# Patient Record
Sex: FEMALE | Race: BLACK OR AFRICAN AMERICAN | NOT HISPANIC OR LATINO | URBAN - METROPOLITAN AREA
[De-identification: names, ages, dates, MRNs, and addresses within clinical notes are randomized per-mention and may not be internally consistent; named-entity substitution may affect disease eponyms.]

---

## 2017-01-06 ENCOUNTER — GENERIC CONVERSION - ENCOUNTER (OUTPATIENT)
Dept: OTHER | Facility: OTHER | Age: 72
End: 2017-01-06

## 2017-05-25 ENCOUNTER — GENERIC CONVERSION - ENCOUNTER (OUTPATIENT)
Dept: OTHER | Facility: OTHER | Age: 72
End: 2017-05-25

## 2017-05-31 ENCOUNTER — GENERIC CONVERSION - ENCOUNTER (OUTPATIENT)
Dept: OTHER | Facility: OTHER | Age: 72
End: 2017-05-31

## 2017-07-21 ENCOUNTER — GENERIC CONVERSION - ENCOUNTER (OUTPATIENT)
Dept: OTHER | Facility: OTHER | Age: 72
End: 2017-07-21

## 2017-07-30 ENCOUNTER — GENERIC CONVERSION - ENCOUNTER (OUTPATIENT)
Dept: OTHER | Facility: OTHER | Age: 72
End: 2017-07-30

## 2017-10-12 ENCOUNTER — GENERIC CONVERSION - ENCOUNTER (OUTPATIENT)
Dept: OTHER | Facility: OTHER | Age: 72
End: 2017-10-12

## 2017-10-12 DIAGNOSIS — G62.9 POLYNEUROPATHY: ICD-10-CM

## 2017-10-12 DIAGNOSIS — M54.9 DORSALGIA: ICD-10-CM

## 2017-10-12 DIAGNOSIS — M54.12 RADICULOPATHY OF CERVICAL REGION: ICD-10-CM

## 2017-10-12 DIAGNOSIS — G95.20 CORD COMPRESSION (HCC): ICD-10-CM

## 2017-10-25 ENCOUNTER — HOSPITAL ENCOUNTER (OUTPATIENT)
Dept: MRI IMAGING | Facility: CLINIC | Age: 72
Discharge: HOME | End: 2017-10-25
Payer: MEDICARE

## 2017-10-25 ENCOUNTER — HOSPITAL ENCOUNTER (OUTPATIENT)
Dept: MRI IMAGING | Facility: CLINIC | Age: 72
End: 2017-10-25
Payer: MEDICARE

## 2017-10-25 DIAGNOSIS — M54.9 DORSALGIA: ICD-10-CM

## 2017-10-25 DIAGNOSIS — M54.12 RADICULOPATHY OF CERVICAL REGION: ICD-10-CM

## 2017-10-25 PROCEDURE — 72141 MRI NECK SPINE W/O DYE: CPT

## 2017-10-26 ENCOUNTER — GENERIC CONVERSION - ENCOUNTER (OUTPATIENT)
Dept: OTHER | Facility: OTHER | Age: 72
End: 2017-10-26

## 2017-10-27 ENCOUNTER — ALLSCRIPTS OFFICE VISIT (OUTPATIENT)
Dept: OTHER | Facility: OTHER | Age: 72
End: 2017-10-27

## 2017-10-27 ENCOUNTER — GENERIC CONVERSION - ENCOUNTER (OUTPATIENT)
Dept: OTHER | Facility: OTHER | Age: 72
End: 2017-10-27

## 2017-10-27 ENCOUNTER — HOSPITAL ENCOUNTER (INPATIENT)
Facility: HOSPITAL | Age: 72
LOS: 6 days | DRG: 542 | End: 2017-11-02
Attending: NEUROLOGICAL SURGERY | Admitting: INTERNAL MEDICINE
Payer: MEDICARE

## 2017-10-27 DIAGNOSIS — C79.49 METASTASIS TO SPINAL CORD (HCC): ICD-10-CM

## 2017-10-27 DIAGNOSIS — G95.20 CORD COMPRESSION (HCC): ICD-10-CM

## 2017-10-27 DIAGNOSIS — C50.919 BREAST CANCER (HCC): Primary | ICD-10-CM

## 2017-10-27 DIAGNOSIS — G95.20 CERVICAL CORD COMPRESSION WITH MYELOPATHY (HCC): ICD-10-CM

## 2017-10-27 PROBLEM — I10 ESSENTIAL HYPERTENSION: Status: ACTIVE | Noted: 2017-10-27

## 2017-10-27 LAB
ANION GAP SERPL CALCULATED.3IONS-SCNC: 6 MMOL/L (ref 4–13)
BASOPHILS # BLD AUTO: 0.01 THOUSANDS/ΜL (ref 0–0.1)
BASOPHILS NFR BLD AUTO: 0 % (ref 0–1)
BUN SERPL-MCNC: 6 MG/DL (ref 5–25)
CALCIUM SERPL-MCNC: 8.5 MG/DL (ref 8.3–10.1)
CHLORIDE SERPL-SCNC: 107 MMOL/L (ref 100–108)
CO2 SERPL-SCNC: 27 MMOL/L (ref 21–32)
CREAT SERPL-MCNC: 0.64 MG/DL (ref 0.6–1.3)
EOSINOPHIL # BLD AUTO: 0.04 THOUSAND/ΜL (ref 0–0.61)
EOSINOPHIL NFR BLD AUTO: 1 % (ref 0–6)
ERYTHROCYTE [DISTWIDTH] IN BLOOD BY AUTOMATED COUNT: 13.4 % (ref 11.6–15.1)
GFR SERPL CREATININE-BSD FRML MDRD: 105 ML/MIN/1.73SQ M
GLUCOSE SERPL-MCNC: 91 MG/DL (ref 65–140)
HCT VFR BLD AUTO: 34.9 % (ref 34.8–46.1)
HGB BLD-MCNC: 11.2 G/DL (ref 11.5–15.4)
LYMPHOCYTES # BLD AUTO: 1.23 THOUSANDS/ΜL (ref 0.6–4.47)
LYMPHOCYTES NFR BLD AUTO: 24 % (ref 14–44)
MCH RBC QN AUTO: 27.3 PG (ref 26.8–34.3)
MCHC RBC AUTO-ENTMCNC: 32.1 G/DL (ref 31.4–37.4)
MCV RBC AUTO: 85 FL (ref 82–98)
MONOCYTES # BLD AUTO: 0.46 THOUSAND/ΜL (ref 0.17–1.22)
MONOCYTES NFR BLD AUTO: 9 % (ref 4–12)
NEUTROPHILS # BLD AUTO: 3.49 THOUSANDS/ΜL (ref 1.85–7.62)
NEUTS SEG NFR BLD AUTO: 66 % (ref 43–75)
NRBC BLD AUTO-RTO: 0 /100 WBCS
PLATELET # BLD AUTO: 248 THOUSANDS/UL (ref 149–390)
PMV BLD AUTO: 8.7 FL (ref 8.9–12.7)
POTASSIUM SERPL-SCNC: 3.8 MMOL/L (ref 3.5–5.3)
RBC # BLD AUTO: 4.1 MILLION/UL (ref 3.81–5.12)
SODIUM SERPL-SCNC: 140 MMOL/L (ref 136–145)
WBC # BLD AUTO: 5.24 THOUSAND/UL (ref 4.31–10.16)

## 2017-10-27 PROCEDURE — 80048 BASIC METABOLIC PNL TOTAL CA: CPT | Performed by: INTERNAL MEDICINE

## 2017-10-27 PROCEDURE — 85025 COMPLETE CBC W/AUTO DIFF WBC: CPT | Performed by: INTERNAL MEDICINE

## 2017-10-27 RX ORDER — ACETAMINOPHEN 325 MG/1
650 TABLET ORAL EVERY 6 HOURS PRN
Status: DISCONTINUED | OUTPATIENT
Start: 2017-10-27 | End: 2017-10-28

## 2017-10-27 RX ORDER — PROCHLORPERAZINE MALEATE 5 MG/1
5 TABLET ORAL EVERY 6 HOURS PRN
COMMUNITY
End: 2017-11-17 | Stop reason: HOSPADM

## 2017-10-27 RX ORDER — LEVOTHYROXINE SODIUM 0.1 MG/1
100 TABLET ORAL DAILY
COMMUNITY

## 2017-10-27 RX ORDER — MORPHINE SULFATE 15 MG/1
15 TABLET, FILM COATED, EXTENDED RELEASE ORAL 2 TIMES DAILY
COMMUNITY
End: 2017-11-17 | Stop reason: HOSPADM

## 2017-10-27 RX ORDER — HEPARIN SODIUM 5000 [USP'U]/ML
5000 INJECTION, SOLUTION INTRAVENOUS; SUBCUTANEOUS EVERY 8 HOURS SCHEDULED
Status: DISCONTINUED | OUTPATIENT
Start: 2017-10-27 | End: 2017-11-02 | Stop reason: HOSPADM

## 2017-10-27 RX ORDER — PREGABALIN 50 MG/1
50 CAPSULE ORAL 3 TIMES DAILY
COMMUNITY

## 2017-10-27 RX ORDER — ONDANSETRON 2 MG/ML
4 INJECTION INTRAMUSCULAR; INTRAVENOUS EVERY 6 HOURS PRN
Status: DISCONTINUED | OUTPATIENT
Start: 2017-10-27 | End: 2017-11-02 | Stop reason: HOSPADM

## 2017-10-27 RX ORDER — AMLODIPINE BESYLATE 5 MG/1
5 TABLET ORAL DAILY
COMMUNITY

## 2017-10-27 RX ORDER — DEXAMETHASONE 4 MG/1
4 TABLET ORAL DAILY
Status: DISCONTINUED | OUTPATIENT
Start: 2017-10-28 | End: 2017-10-30

## 2017-10-27 RX ADMIN — HEPARIN SODIUM 5000 UNITS: 5000 INJECTION, SOLUTION INTRAVENOUS; SUBCUTANEOUS at 18:12

## 2017-10-27 NOTE — H&P
History and Physical - [facility] Internal Medicine    Patient Information: Susy Yu 81 y o  female MRN: [id number]  Unit/Bed#: [facility] 920-01 Encounter: [id number]  Admitting Physician: Winsome Santo MD  PCP: Maritza Loredo MD  Date of Admission:  10/27/17    Assessment/Plan:    Hospital Problem List:     Principal Problem:    Cord compression [facility])  Active Problems:    Breast cancer ([address] )    Essential hypertension      Plan for the Primary Problem(s):  · 1  Cervical spinal cord compression- neurosurgery consulted  Will place on decadron  · 2  H/o of breast ca with metastasis- patient has been following up with [facility] of [facility]  Hematology consulted  · 3  HTN- will continue to monitor and adjust medication as needed  ·     Plan for Additional Problems:   ·     VTE Prophylaxis: Heparin  / sequential compression device   Code Status: Full  POLST: There is no POLST form on file for this patient (pre-hospital)    Anticipated Length of Stay:  Patient will be admitted on an Inpatient basis with an anticipated length of stay of  more 2 midnights  Justification for Hospital Stay: Spinal cord compression, breast ca with metastasis    Total Time for Visit, including Counseling / Coordination of Care: 30 minutes  Greater than 50% of this total time spent on direct patient counseling and coordination of care  Chief Complaint:   Lower extremity weakness for past 8 months    History of Present Illness:    Susy Yu is a 81 y o  female who presents with pmhx of breast cancer with metastasis, HTN, presents as a direct admission from neurosurgery in the [place] [id number] because patient was seen in the office and found to have a cervical spinal cord compression on MRI  Patient has a history of breast ca diagnosed in 2010  Patient has been getting followup for her cancer in [place] and now with [facility]  She also complains of being unable to walk for the past month and is now wheel chair bound  Patient admitted for further evaluation  Review of Systems:    Review of Systems   HENT: Negative  Eyes: Negative  Respiratory: Negative  Cardiovascular: Negative  Gastrointestinal: Negative  Genitourinary: Negative  Musculoskeletal: Positive for arthralgias, gait problem, myalgias and neck stiffness  Negative for back pain, joint swelling and neck pain  Neurological: Positive for weakness  Negative for dizziness, tremors, seizures, syncope, facial asymmetry, speech difficulty, light-headedness, numbness and headaches  Psychiatric/Behavioral: Negative  Past Medical and Surgical History:     No past medical history on file  No past surgical history on file  Meds/Allergies:    Prior to Admission medications    Not on File     I have reviewed home medications with patient personally  Allergies: Allergies   Allergen Reactions    Gabapentin GI Intolerance    Hydrocodone GI Intolerance    Ibuprofen Rash       Social History:     Marital Status:    Occupation:   Patient Pre-hospital Living Situation: home  Patient Pre-hospital Level of Mobility: wheelchair  Patient Pre-hospital Diet Restrictions: low sodium  Substance Use History:   History   Alcohol use Not on file     History   Smoking Status    Not on file   Smokeless Tobacco    Not on file     History   Drug use: Unknown       Family History:    non-contributory    Physical Exam:     Vitals:        Physical Exam   Constitutional: She is oriented to person, place, and time  She appears well-developed and well-nourished  HENT:   Head: Normocephalic and atraumatic  Eyes: Conjunctivae and EOM are normal  Pupils are equal, round, and reactive to light  Neck: Normal range of motion  No JVD present  No tracheal deviation present  No thyromegaly present  Cardiovascular: Normal rate, regular rhythm and normal heart sounds  Exam reveals no gallop and no friction rub  No murmur heard    Pulmonary/Chest: Effort normal and breath sounds normal  No respiratory distress  She has no wheezes  She has no rales  Abdominal: Soft  Bowel sounds are normal  She exhibits no distension  There is no tenderness  There is no rebound  Musculoskeletal: She exhibits no edema  Bilater LE strength 3/5   Neurological: She is alert and oriented to person, place, and time  Vitals reviewed  Additional Data:     Lab Results: I have personally reviewed pertinent reports  Invalid input(s): LABALBU        Imaging: I have personally reviewed pertinent reports  Mri Cervical Spine Wo Contrast    Result Date: 10/26/2017  Narrative: MRI CERVICAL SPINE WITHOUT CONTRAST INDICATION:  M54 12: Radiculopathy, cervical region  History taken directly from the electronic ordering system  COMPARISON:  None  TECHNIQUE:  Sagittal T1, sagittal T2, sagittal inversion recovery, axial T2, axial  2D merge     IMAGE QUALITY:  Diagnostic FINDINGS: ALIGNMENT:  Developmental narrowing of the spinal canal is noted  MARROW SIGNAL:  The overall marrow signal appears maintained for age  No clear evidence for marrow edema pattern or marrow replacement  CERVICAL AND VISUALIZED THORACIC CORD:  There is intramedullary T2 hyperintensity with suggestion of mild cord expansion extending from mainly C3-C4 to C5-C6 with trace extension of signal to the mid upper C3 level as well suspected inferior extension to  the level of C7-T1  Given the adjacent compressive change, compressive myelopathy would be the main consideration  However, this needs a contrast-enhanced examination, especially in light of history of malignancy  PREVERTEBRAL AND PARASPINAL SOFT TISSUES:   Incomplete evaluation of the left apex  Signs of subcutaneous edema and reticulation involving the left neck musculature  Again these are incompletely evaluated          VISUALIZED POSTERIOR FOSSA:  The visualized posterior fossa demonstrates no abnormal signal  CERVICAL DISC SPACES: C2-C3:  No significant spinal canal stenosis  No right and no left neural foraminal stenosis  C3-C4:  Central disc protrusion  Disc osteophyte complex with uncovertebral hypertrophy  Mild compression on the central cord  Moderate to severe right and mild to moderate left neural foraminal stenosis  C4-C5:  Disc osteophyte complex, uncovertebral hypertrophy, and facet arthropathy  Central protrusion  Mild compression on the ventral cord  Moderate to severe right and mild left neural foraminal stenosis  C5-C6:  Disc osteophyte complex, uncovertebral hypertrophy, and facet arthropathy  Mild compression on the cord  Moderate to severe right and severe left neural foraminal stenosis  C6-C7:  Disc osteophyte complex, uncovertebral hypertrophy, and facet arthropathy  No significant spinal canal stenosis  No right and moderate left neural foraminal stenosis  C7-T1:  No significant spinal canal stenosis  No right and moderate left neural foraminal stenosis  UPPER THORACIC DISC SPACES:  Normal      Impression: Signs of cord signal abnormality and associated expansion with the dominant component extending from C3-C4 to C5-C6  Signs of flanking intramedullary signal extending above and below without associated expansion  Given the presence of cord compression,  suspect compressive myelopathy  However, given history of malignancy and possible radiation treatment (although the marrow signal does not confirm this], needs correlation with contrast-enhanced imaging  No signs of a marrow infiltrative process or marrow edema pattern  Incomplete evaluation of the left apex with signs of edema involving the left neck musculature  Needs correlation with dedicated cross-sectional imaging or prior imaging if available   ##phoslh##phoslh ##cfslh I personally discussed this result with David Borja on 10/26/2017 2:00 PM  ## Workstation performed: [id number]       EKG, Pathology, and Other Studies Reviewed on Admission: · EKG:     Allscripts / Epic Records Reviewed: Yes     ** Please Note: This note has been constructed using a voice recognition system   **
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
## 2017-10-28 ENCOUNTER — APPOINTMENT (INPATIENT)
Dept: RADIOLOGY | Facility: HOSPITAL | Age: 72
DRG: 542 | End: 2017-10-28
Payer: MEDICARE

## 2017-10-28 PROBLEM — E03.9 HYPOTHYROIDISM: Status: ACTIVE | Noted: 2017-10-28

## 2017-10-28 LAB
ANION GAP SERPL CALCULATED.3IONS-SCNC: 7 MMOL/L (ref 4–13)
BUN SERPL-MCNC: 6 MG/DL (ref 5–25)
CALCIUM SERPL-MCNC: 8.8 MG/DL (ref 8.3–10.1)
CHLORIDE SERPL-SCNC: 105 MMOL/L (ref 100–108)
CO2 SERPL-SCNC: 26 MMOL/L (ref 21–32)
CREAT SERPL-MCNC: 0.5 MG/DL (ref 0.6–1.3)
ERYTHROCYTE [DISTWIDTH] IN BLOOD BY AUTOMATED COUNT: 13.6 % (ref 11.6–15.1)
GFR SERPL CREATININE-BSD FRML MDRD: 113 ML/MIN/1.73SQ M
GLUCOSE SERPL-MCNC: 85 MG/DL (ref 65–140)
HCT VFR BLD AUTO: 36.7 % (ref 34.8–46.1)
HGB BLD-MCNC: 11.8 G/DL (ref 11.5–15.4)
MCH RBC QN AUTO: 27.4 PG (ref 26.8–34.3)
MCHC RBC AUTO-ENTMCNC: 32.2 G/DL (ref 31.4–37.4)
MCV RBC AUTO: 85 FL (ref 82–98)
PLATELET # BLD AUTO: 265 THOUSANDS/UL (ref 149–390)
PMV BLD AUTO: 9.3 FL (ref 8.9–12.7)
POTASSIUM SERPL-SCNC: 3.8 MMOL/L (ref 3.5–5.3)
RBC # BLD AUTO: 4.3 MILLION/UL (ref 3.81–5.12)
SODIUM SERPL-SCNC: 138 MMOL/L (ref 136–145)
WBC # BLD AUTO: 3.11 THOUSAND/UL (ref 4.31–10.16)

## 2017-10-28 PROCEDURE — 74177 CT ABD & PELVIS W/CONTRAST: CPT

## 2017-10-28 PROCEDURE — 86300 IMMUNOASSAY TUMOR CA 15-3: CPT | Performed by: INTERNAL MEDICINE

## 2017-10-28 PROCEDURE — 85027 COMPLETE CBC AUTOMATED: CPT | Performed by: INTERNAL MEDICINE

## 2017-10-28 PROCEDURE — 72157 MRI CHEST SPINE W/O & W/DYE: CPT

## 2017-10-28 PROCEDURE — 80048 BASIC METABOLIC PNL TOTAL CA: CPT | Performed by: INTERNAL MEDICINE

## 2017-10-28 PROCEDURE — 72142 MRI NECK SPINE W/DYE: CPT

## 2017-10-28 PROCEDURE — 71260 CT THORAX DX C+: CPT

## 2017-10-28 PROCEDURE — A9585 GADOBUTROL INJECTION: HCPCS | Performed by: INTERNAL MEDICINE

## 2017-10-28 RX ORDER — PREGABALIN 25 MG/1
50 CAPSULE ORAL 3 TIMES DAILY
Status: DISCONTINUED | OUTPATIENT
Start: 2017-10-28 | End: 2017-11-02 | Stop reason: HOSPADM

## 2017-10-28 RX ORDER — OXYCODONE HYDROCHLORIDE 10 MG/1
10 TABLET ORAL EVERY 8 HOURS PRN
Status: DISCONTINUED | OUTPATIENT
Start: 2017-10-28 | End: 2017-11-02 | Stop reason: HOSPADM

## 2017-10-28 RX ORDER — ACETAMINOPHEN 325 MG/1
975 TABLET ORAL EVERY 8 HOURS SCHEDULED
Status: DISCONTINUED | OUTPATIENT
Start: 2017-10-28 | End: 2017-11-02 | Stop reason: HOSPADM

## 2017-10-28 RX ORDER — LIDOCAINE 50 MG/G
1 PATCH TOPICAL DAILY
Status: COMPLETED | OUTPATIENT
Start: 2017-10-29 | End: 2017-10-29

## 2017-10-28 RX ORDER — OXYCODONE HYDROCHLORIDE 5 MG/1
10 TABLET ORAL 3 TIMES DAILY
COMMUNITY
End: 2017-11-17 | Stop reason: HOSPADM

## 2017-10-28 RX ORDER — MORPHINE SULFATE 15 MG/1
15 TABLET, FILM COATED, EXTENDED RELEASE ORAL 2 TIMES DAILY
Status: DISCONTINUED | OUTPATIENT
Start: 2017-10-28 | End: 2017-11-02 | Stop reason: HOSPADM

## 2017-10-28 RX ORDER — AMOXICILLIN 250 MG
1 CAPSULE ORAL
Status: DISCONTINUED | OUTPATIENT
Start: 2017-10-28 | End: 2017-11-02 | Stop reason: HOSPADM

## 2017-10-28 RX ORDER — AMLODIPINE BESYLATE 5 MG/1
5 TABLET ORAL DAILY
Status: DISCONTINUED | OUTPATIENT
Start: 2017-10-28 | End: 2017-11-02 | Stop reason: HOSPADM

## 2017-10-28 RX ORDER — POLYETHYLENE GLYCOL 3350 17 G/17G
17 POWDER, FOR SOLUTION ORAL DAILY PRN
Status: DISCONTINUED | OUTPATIENT
Start: 2017-10-28 | End: 2017-11-02 | Stop reason: HOSPADM

## 2017-10-28 RX ORDER — LEVOTHYROXINE SODIUM 0.1 MG/1
100 TABLET ORAL
Status: DISCONTINUED | OUTPATIENT
Start: 2017-10-28 | End: 2017-11-02 | Stop reason: HOSPADM

## 2017-10-28 RX ADMIN — ACETAMINOPHEN 650 MG: 325 TABLET, FILM COATED ORAL at 09:10

## 2017-10-28 RX ADMIN — DEXAMETHASONE 4 MG: 4 TABLET ORAL at 09:10

## 2017-10-28 RX ADMIN — ACETAMINOPHEN 975 MG: 325 TABLET, FILM COATED ORAL at 21:11

## 2017-10-28 RX ADMIN — PREGABALIN 50 MG: 25 CAPSULE ORAL at 21:11

## 2017-10-28 RX ADMIN — HEPARIN SODIUM 5000 UNITS: 5000 INJECTION, SOLUTION INTRAVENOUS; SUBCUTANEOUS at 21:11

## 2017-10-28 RX ADMIN — Medication 1 TABLET: at 21:11

## 2017-10-28 RX ADMIN — HEPARIN SODIUM 5000 UNITS: 5000 INJECTION, SOLUTION INTRAVENOUS; SUBCUTANEOUS at 06:21

## 2017-10-28 RX ADMIN — PREGABALIN 50 MG: 25 CAPSULE ORAL at 16:57

## 2017-10-28 RX ADMIN — HEPARIN SODIUM 5000 UNITS: 5000 INJECTION, SOLUTION INTRAVENOUS; SUBCUTANEOUS at 14:06

## 2017-10-28 RX ADMIN — GADOBUTROL 6 ML: 604.72 INJECTION INTRAVENOUS at 12:25

## 2017-10-28 RX ADMIN — MORPHINE SULFATE 15 MG: 15 TABLET, EXTENDED RELEASE ORAL at 18:54

## 2017-10-28 RX ADMIN — IOHEXOL 100 ML: 350 INJECTION, SOLUTION INTRAVENOUS at 12:04

## 2017-10-28 NOTE — SOCIAL WORK
Cm met with pt to discuss the role of CM  Pt lives with her son in a 2 story home which has 4STE and 13 steps inside  Pt states she is fully dependent for all mobilities PTA and uses a wheelchair for all mobility  Pt's pharmacy is [facility] on [address]  In [address] [facility]  Pt has no hx of mental health or substance abuse  Pt has no hx of IP rehab   Pt states her family will transport if she is able to d/c home

## 2017-10-28 NOTE — PROGRESS NOTES
[facility] Internal Medicine Progress Note  Patient: Karen Hnady 81 y o  female   MRN: [id number]  PCP: Vandana Verde MD  Unit/Bed#: [facility] 920-01 Encounter: [id number]  Date Of Visit: 10/28/17    Assessment/Plan:  · Cervical cord compression with myelopathy ([facility] [address] ) - likely related to metastatic disease  Discussed with Neurosurgery  No immediate plans for surgical intervention at this time  Patient is pending MRI cervical/thoracic spine with contrast   Will also obtain CT of the chest/abdomen/pelvis with contrast to evaluate for metastatic disease  Continue Decadron and await Oncology input  · Functional quadriparesis - in the setting of above  Management as above  · Metastatic Breast cancer ([facility] [address] ) - management per Oncology  · Essential hypertension - resume home medications  · Hypothyroidism - continue levothyroxine    VTE Pharmacologic Prophylaxis:   Pharmacologic: Heparin  Mechanical VTE Prophylaxis in Place: Yes    Patient Centered Rounds: I have performed bedside rounds with nursing staff today  Discussions with Specialists or Other Care Team Provider:  Nursing/neurosurgery    Education and Discussions with Family / Patient:  Patient/discussed with daughter Mora Putnam on phone  She was updated on current plan of care  All questions answered  Current Length of Stay: 1 day(s)    Current Patient Status: Inpatient   Certification Statement: The patient will continue to require additional inpatient hospital stay due to Cervical cord compression/functional quadriparesis    Discharge Plan:  Not stable for discharge    Code Status: Level 1 - Full Code      Subjective:   Patient seen and evaluated  She has had progressive functional quadriparesis over the last several months  She also notes upper extremity tremors  Denies any fever or chills, no chest pain or shortness of breath, no nausea, vomiting or abdominal pain      Objective:     Vitals:   Temp (24hrs), Av 3 °F (36 8 °C), Min:98 1 °F (36 7 °C), Max:98 6 °F (37 °C)    HR:  [76-86] 77  Resp:  [16-18] 18  BP: ()/(67-86) 99/68  SpO2:  [92 %-98 %] 97 %  There is no height or weight on file to calculate BMI  Input and Output Summary (last 24 hours): Intake/Output Summary (Last 24 hours) at 10/28/17 1047  Last data filed at 10/28/17 1001   Gross per 24 hour   Intake              200 ml   Output              430 ml   Net             -230 ml       Physical Exam:  General Appearance:    Alert, cooperative, no distress, appropriately responsive    Head:    Normocephalic, without obvious abnormality, atraumatic, mucous membranes moist    Eyes:    Conjunctiva/corneas clear, EOM's intact   Neck:   Supple   Lungs:     Clear to auscultation bilaterally, respirations unlabored, no crackles or wheeze     Heart:    Regular rate and rhythm, S1 and S2    Abdomen:     Soft, non-tender, nondistended   Extremities:   No edema   Neurologic:   Bilateral upper extremity tremors, lower extremity weakness, alert and oriented x3         Additional Data:     Labs:      Results from last 7 days  Lab Units 10/28/17  0546 10/27/17  2016   WBC Thousand/uL 3 11* 5 24   HEMOGLOBIN g/dL 11 8 11 2*   HEMATOCRIT % 36 7 34 9   PLATELETS Thousands/uL 265 248   NEUTROS PCT %  --  66   LYMPHS PCT %  --  24   MONOS PCT %  --  9   EOS PCT %  --  1       Results from last 7 days  Lab Units 10/28/17  0546   SODIUM mmol/L 138   POTASSIUM mmol/L 3 8   CHLORIDE mmol/L 105   CO2 mmol/L 26   BUN mg/dL 6   CREATININE mg/dL 0 50*   CALCIUM mg/dL 8 8   GLUCOSE RANDOM mg/dL 85           * I Have Reviewed All Lab Data Listed Above  * Additional Pertinent Lab Tests Reviewed:  [address] Admission Reviewed    Cultures:   Blood Culture: No results found for: BLOODCX  Urine Culture: No results found for: URINECX  Sputum Culture: No components found for: SPUTUMCX  Wound Culture: No results found for: WOUNDCULT    Last 24 Hours Medication List:     amLODIPine 5 mg Oral Daily dexamethasone 4 mg Oral Daily   heparin (porcine) 5,000 Units Subcutaneous Q8H [address]   levothyroxine 100 mcg Oral Early Morning   morphine 15 mg Oral BID   pregabalin 50 mg Oral TID        Today, Patient Was Seen By: Aurora Ruffin MD    ** Please Note: Dragon 360 Dictation voice to text software may have been used in the creation of this document   **

## 2017-10-28 NOTE — CONSULTS
Oncology Consult Note  Penelope Peabody 81 y o  female MRN: [id number]  [facility] Encounter: [id number]      Presenting Complaint:  Metastatic breast cancer    History of Presenting Illness:  53-year-old  female was diagnosed with initially with stage III left invasive ductal carcinoma of the breast (T2 N2 M0) ER positive, VT positive, HER2 negative with 4/13 positive lymph nodes after lumpectomy, in 2009, status post 4 cycles of adjuvant Adriamycin/Cytoxan followed by Taxotere of 4 cycles finished in 10/2010 and she had radiation therapy and Femara 2 5 mg from February 2011 until October 2016 when she was diagnosed at the time with metastatic breast cancer from the right axillary lymph node biopsy showed poorly differentiated ER negative, VT negative, HER2 negative breast cancer also T8 biopsy confirmed the diagnosis, she had radiation therapy to the thoracic spine from November 2016 and treated with Taxol 80 milligram/meter squared initially and then every 3 weeks in January 2017 until April 2017, imaging studies showed cervical, supraclavicular, axillary, mediastinal, upper abdomen and retroperitoneal lymph nodes with osseous metastasis,  She had progression of disease on Taxol and she was treated with Ixempra  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  She went to [facility] for breast cancer care, and she was initiated on Xeloda in September 2017  The patient noticed 10 days ago increasing weakness of the right lower extremity with drop for, tingling and numbness involving the left shoulder, left arm she is not able to make a full , and she is not able to stand up anymore  She was admitted imaging studies showed possible intramedullary lesion extending from C3-C7 on possible C4/C5 vertebral lesion with cord compression, CT scan showed multiple hepatic lesions, left pleural effusion  She denies any headache blurred vision diplopia odynophagia dysphagia abdominal pain dysuria hematuria melena hematochezia constipation or incontinence    Review of Systems - As stated in the HPI otherwise the fourteen point review of systems was negative  Past Medical History:   Diagnosis Date    Breast cancer ([address] )     Disease of thyroid gland        Social History     Social History    Marital status:      Spouse name: N/A    Number of children: N/A    Years of education: N/A     Social History Main Topics    Smoking status: Never Smoker    Smokeless tobacco: Not on file    Alcohol use No    Drug use: No    Sexual activity: Not on file     Other Topics Concern    Not on file     Social History Narrative    No narrative on file       No family history on file      Allergies   Allergen Reactions    Gabapentin GI Intolerance    Hydrocodone GI Intolerance    Ibuprofen Rash         Current Facility-Administered Medications:     acetaminophen (TYLENOL) tablet 650 mg, 650 mg, Oral, Q6H PRN, Felipe Madison MD, 650 mg at 10/28/17 0910    amLODIPine (NORVASC) tablet 5 mg, 5 mg, Oral, Daily, Jeramie River MD    dexamethasone (DECADRON) tablet 4 mg, 4 mg, Oral, Daily, Felipe Madison MD, 4 mg at 10/28/17 0910    gadobutrol injection (MULTI-DOSE) SOLN 6 mL, 6 mL, Intravenous, Once in imaging, Jeramie River MD    heparin (porcine) subcutaneous injection 5,000 Units, 5,000 Units, Subcutaneous, Q8H [facility], 5,000 Units at 10/28/17 4584 **AND** Platelet count, , , Once, Felipe Madison MD    levothyroxine tablet 100 mcg, 100 mcg, Oral, Early Morning, Jeramie River MD    morphine (MS CONTIN) ER tablet 15 mg, 15 mg, Oral, BID, Jeramie River MD    ondansetron (ZOFRAN) injection 4 mg, 4 mg, Intravenous, Q6H PRN, Felipe Madison MD    pregabalin (LYRICA) capsule 50 mg, 50 mg, Oral, TID, Jeramie River MD      BP 99/68   Pulse 77   Temp 98 1 °F (36 7 °C) (Oral)   Resp 18   SpO2 97%     General Appearance:    Alert, oriented        Eyes:    PERRL   Ears:    Normal external ear canals, both ears   Nose:   Nares normal, septum midline   Throat:   Mucosa moist  Pharynx without injection  Neck:   Supple       Lungs:     No breath sounds in the left base   Chest Wall:    No tenderness or deformity    Heart:    Regular rate and rhythm       Abdomen:     Soft, non-tender, bowel sounds +, no organomegaly           Extremities:   Extremities no cyanosis or edema       Skin:   no rash or icterus      Lymph nodes:   Cervical, supraclavicular, and axillary nodes normal   Neurologic:   Weakness of the left upper extremity, weakness of the right lower extremity +2/4              Recent Results (from the past 48 hour(s))   CBC and differential    Collection Time: 10/27/17  8:16 PM   Result Value Ref Range    WBC 5 24 4 31 - 10 16 Thousand/uL    RBC 4 10 3 81 - 5 12 Million/uL    Hemoglobin 11 2 (L) 11 5 - 15 4 g/dL    Hematocrit 34 9 34 8 - 46 1 %    MCV 85 82 - 98 fL    MCH 27 3 26 8 - 34 3 pg    MCHC 32 1 31 4 - 37 4 g/dL    RDW 13 4 11 6 - 15 1 %    MPV 8 7 (L) 8 9 - 12 7 fL    Platelets 599 056 - 323 Thousands/uL    nRBC 0 /100 WBCs    Neutrophils Relative 66 43 - 75 %    Lymphocytes Relative 24 14 - 44 %    Monocytes Relative 9 4 - 12 %    Eosinophils Relative 1 0 - 6 %    Basophils Relative 0 0 - 1 %    Neutrophils Absolute 3 49 1 85 - 7 62 Thousands/µL    Lymphocytes Absolute 1 23 0 60 - 4 47 Thousands/µL    Monocytes Absolute 0 46 0 17 - 1 22 Thousand/µL    Eosinophils Absolute 0 04 0 00 - 0 61 Thousand/µL    Basophils Absolute 0 01 0 00 - 0 10 Thousands/µL   Basic metabolic panel    Collection Time: 10/27/17  8:16 PM   Result Value Ref Range    Sodium 140 136 - 145 mmol/L    Potassium 3 8 3 5 - 5 3 mmol/L    Chloride 107 100 - 108 mmol/L    CO2 27 21 - 32 mmol/L    Anion Gap 6 4 - 13 mmol/L    BUN 6 5 - 25 mg/dL    Creatinine 0 64 0 60 - 1 30 mg/dL    Glucose 91 65 - 140 mg/dL    Calcium 8 5 8 3 - 10 1 mg/dL    eGFR 105 ml/min/1 73sq m   Basic metabolic panel    Collection Time: 10/28/17  5:46 AM   Result Value Ref Range    Sodium 138 136 - 145 mmol/L    Potassium 3 8 3 5 - 5 3 mmol/L    Chloride 105 100 - 108 mmol/L    CO2 26 21 - 32 mmol/L    Anion Gap 7 4 - 13 mmol/L    BUN 6 5 - 25 mg/dL    Creatinine 0 50 (L) 0 60 - 1 30 mg/dL    Glucose 85 65 - 140 mg/dL    Calcium 8 8 8 3 - 10 1 mg/dL    eGFR 113 ml/min/1 73sq m   CBC (With Platelets)    Collection Time: 10/28/17  5:46 AM   Result Value Ref Range    WBC 3 11 (L) 4 31 - 10 16 Thousand/uL    RBC 4 30 3 81 - 5 12 Million/uL    Hemoglobin 11 8 11 5 - 15 4 g/dL    Hematocrit 36 7 34 8 - 46 1 %    MCV 85 82 - 98 fL    MCH 27 4 26 8 - 34 3 pg    MCHC 32 2 31 4 - 37 4 g/dL    RDW 13 6 11 6 - 15 1 %    Platelets 804 926 - 083 Thousands/uL    MPV 9 3 8 9 - 12 7 fL         Mri Cervical Spine Wo Contrast    Result Date: 10/26/2017  Narrative: MRI CERVICAL SPINE WITHOUT CONTRAST INDICATION:  M54 12: Radiculopathy, cervical region  History taken directly from the electronic ordering system  COMPARISON:  None  TECHNIQUE:  Sagittal T1, sagittal T2, sagittal inversion recovery, axial T2, axial  2D merge     IMAGE QUALITY:  Diagnostic FINDINGS: ALIGNMENT:  Developmental narrowing of the spinal canal is noted  MARROW SIGNAL:  The overall marrow signal appears maintained for age  No clear evidence for marrow edema pattern or marrow replacement  CERVICAL AND VISUALIZED THORACIC CORD:  There is intramedullary T2 hyperintensity with suggestion of mild cord expansion extending from mainly C3-C4 to C5-C6 with trace extension of signal to the mid upper C3 level as well suspected inferior extension to  the level of C7-T1  Given the adjacent compressive change, compressive myelopathy would be the main consideration  However, this needs a contrast-enhanced examination, especially in light of history of malignancy  PREVERTEBRAL AND PARASPINAL SOFT TISSUES:   Incomplete evaluation of the left apex    Signs of subcutaneous edema and reticulation involving the left neck musculature  Again these are incompletely evaluated  VISUALIZED POSTERIOR FOSSA:  The visualized posterior fossa demonstrates no abnormal signal  CERVICAL DISC SPACES:     C2-C3:  No significant spinal canal stenosis  No right and no left neural foraminal stenosis  C3-C4:  Central disc protrusion  Disc osteophyte complex with uncovertebral hypertrophy  Mild compression on the central cord  Moderate to severe right and mild to moderate left neural foraminal stenosis  C4-C5:  Disc osteophyte complex, uncovertebral hypertrophy, and facet arthropathy  Central protrusion  Mild compression on the ventral cord  Moderate to severe right and mild left neural foraminal stenosis  C5-C6:  Disc osteophyte complex, uncovertebral hypertrophy, and facet arthropathy  Mild compression on the cord  Moderate to severe right and severe left neural foraminal stenosis  C6-C7:  Disc osteophyte complex, uncovertebral hypertrophy, and facet arthropathy  No significant spinal canal stenosis  No right and moderate left neural foraminal stenosis  C7-T1:  No significant spinal canal stenosis  No right and moderate left neural foraminal stenosis  UPPER THORACIC DISC SPACES:  Normal      Impression: Signs of cord signal abnormality and associated expansion with the dominant component extending from C3-C4 to C5-C6  Signs of flanking intramedullary signal extending above and below without associated expansion  Given the presence of cord compression,  suspect compressive myelopathy  However, given history of malignancy and possible radiation treatment (although the marrow signal does not confirm this], needs correlation with contrast-enhanced imaging  No signs of a marrow infiltrative process or marrow edema pattern  Incomplete evaluation of the left apex with signs of edema involving the left neck musculature  Needs correlation with dedicated cross-sectional imaging or prior imaging if available   ##phoslh##phoslh ##cfslh I personally discussed this result with Cricket Freeman on 10/26/2017 2:00 PM  ## Workstation performed: [id number]       Assessment and Plan:  1  Metastatic triple negative breast cancer as mentioned above in the history of present illness, she was treated at [facility] with Taxol, Ixempra and the patient change care to [address] she was initiated on Xeloda in September 2017, she presented with weakness of the right lower extremity, with drop foot, weakness of the left upper extremity, MRI of the cervical area showed intramedullary lesion extending between C3-C7, also possible vertebral body involvement of C4-C5 with extension into the spine and possible compression  2  CT scan of the chest abdomen and pelvis showed left pleural effusion moderate in size, multiple hepatic lesions consistent with metastatic disease  3  Neurosurgery are on the case, I believe the patient needs to be evaluated by Radiation Oncology for possible radiation of the cervical spine, she had previous radiation to T8 because of bone metastasis, 4  This is a fast progressing disease, prognosis guarded  4  After finishing from radiation therapy or surgical intervention by Neurosurgery patient to follow up at [address] with her medical oncologist to talk about prognosis and the new finding of hepatic lesions and possible malignant left pleural effusion  5    I will order CA 27-29

## 2017-10-28 NOTE — PLAN OF CARE
Problem: Potential for Falls  Goal: Patient will remain free of falls  INTERVENTIONS:  - Assess patient frequently for physical needs  -  Identify cognitive and physical deficits and behaviors that affect risk of falls  -  [place] fall precautions as indicated by assessment   - Educate patient/family on patient safety including physical limitations  - Instruct patient to call for assistance with activity based on assessment  - Modify environment to reduce risk of injury  - Consider OT/PT consult to assist with strengthening/mobility    Outcome: Progressing      Problem: Nutrition/Hydration-ADULT  Goal: Nutrient/Hydration intake appropriate for improving, restoring or maintaining nutritional needs  Monitor and assess patient's nutrition/hydration status for malnutrition (ex- brittle hair, bruises, dry skin, pale skin and conjunctiva, muscle wasting, smooth red tongue, and disorientation)  Collaborate with interdisciplinary team and initiate plan and interventions as ordered  Monitor patient's weight and dietary intake as ordered or per policy  Utilize nutrition screening tool and intervene per policy  Determine patient's food preferences and provide high-protein, high-caloric foods as appropriate       INTERVENTIONS:  - Monitor oral intake, urinary output, labs, and treatment plans  - Assess nutrition and hydration status and recommend course of action  - Evaluate amount of meals eaten  - Assist patient with eating if necessary   - Allow adequate time for meals  - Recommend/ encourage appropriate diets, oral nutritional supplements, and vitamin/mineral supplements  - Order, calculate, and assess calorie counts as needed  - Recommend, monitor, and adjust tube feedings and TPN/PPN based on assessed needs  - Assess need for intravenous fluids  - Provide specific nutrition/hydration education as appropriate  - Include patient/family/caregiver in decisions related to nutrition   Outcome: Progressing      Problem: Prexisting or High Potential for Compromised Skin Integrity  Goal: Skin integrity is maintained or improved  INTERVENTIONS:  - Identify patients at risk for skin breakdown  - Assess and monitor skin integrity  - Assess and monitor nutrition and hydration status  - Monitor labs (i e  albumin)  - Assess for incontinence   - Turn and reposition patient  - Assist with mobility/ambulation  - Relieve pressure over bony prominences  - Avoid friction and shearing  - Provide appropriate hygiene as needed including keeping skin clean and dry  - Evaluate need for skin moisturizer/barrier cream  - Collaborate with interdisciplinary team (i e  Nutrition, Rehabilitation, etc )   - Patient/family teaching   Outcome: Progressing      Problem: PAIN - ADULT  Goal: Verbalizes/displays adequate comfort level or baseline comfort level  Interventions:  - Encourage patient to monitor pain and request assistance  - Assess pain using appropriate pain scale  - Administer analgesics based on type and severity of pain and evaluate response  - Implement non-pharmacological measures as appropriate and evaluate response  - Consider cultural and social influences on pain and pain management  - Notify physician/advanced practitioner if interventions unsuccessful or patient reports new pain   Outcome: Progressing      Problem: INFECTION - ADULT  Goal: Absence or prevention of progression during hospitalization  INTERVENTIONS:  - Assess and monitor for signs and symptoms of infection  - Monitor lab/diagnostic results  - Monitor all insertion sites, i e  indwelling lines, tubes, and drains  - Monitor endotracheal (as able) and nasal secretions for changes in amount and color  - [place] appropriate cooling/warming therapies per order  - Administer medications as ordered  - Instruct and encourage patient and family to use good hand hygiene technique  - Identify and instruct in appropriate isolation precautions for identified infection/condition Outcome: Progressing    Goal: Absence of fever/infection during neutropenic period  INTERVENTIONS:  - Monitor WBC  - Implement neutropenic guidelines   Outcome: Progressing      Problem: SAFETY ADULT  Goal: Maintain or return to baseline ADL function  INTERVENTIONS:  -  Assess patient's ability to carry out ADLs; assess patient's baseline for ADL function and identify physical deficits which impact ability to perform ADLs (bathing, care of mouth/teeth, toileting, grooming, dressing, etc )  - Assess/evaluate cause of self-care deficits   - Assess range of motion  - Assess patient's mobility; develop plan if impaired  - Assess patient's need for assistive devices and provide as appropriate  - Encourage maximum independence but intervene and supervise when necessary  ¯ Involve family in performance of ADLs  ¯ Assess for home care needs following discharge   ¯ Request OT consult to assist with ADL evaluation and planning for discharge  ¯ Provide patient education as appropriate   Outcome: Progressing    Goal: Maintain or return mobility status to optimal level  INTERVENTIONS:  - Assess patient's baseline mobility status (ambulation, transfers, stairs, etc )    - Identify cognitive and physical deficits and behaviors that affect mobility  - Identify mobility aids required to assist with transfers and/or ambulation (gait belt, sit-to-stand, lift, walker, cane, etc )  - [place] fall precautions as indicated by assessment  - Record patient progress and toleration of activity level on Mobility SBAR; progress patient to next Phase/Stage  - Instruct patient to call for assistance with activity based on assessment  - Request Rehabilitation consult to assist with strengthening/weightbearing, etc    Outcome: Progressing    Goal: Patient will remain free of falls  INTERVENTIONS:  - Assess patient frequently for physical needs  -  Identify cognitive and physical deficits and behaviors that affect risk of falls    -  [place] fall precautions as indicated by assessment   - Educate patient/family on patient safety including physical limitations  - Instruct patient to call for assistance with activity based on assessment  - Modify environment to reduce risk of injury  - Consider OT/PT consult to assist with strengthening/mobility    Outcome: Progressing      Problem: DISCHARGE PLANNING  Goal: Discharge to home or other facility with appropriate resources  INTERVENTIONS:  - Identify barriers to discharge w/patient and caregiver  - Arrange for needed discharge resources and transportation as appropriate  - Identify discharge learning needs (meds, wound care, etc )  - Arrange for interpretive services to assist at discharge as needed  - Refer to Case Management Department for coordinating discharge planning if the patient needs post-hospital services based on physician/advanced practitioner order or complex needs related to functional status, cognitive ability, or social support system   Outcome: Progressing      Problem: Knowledge Deficit  Goal: Patient/family/caregiver demonstrates understanding of disease process, treatment plan, medications, and discharge instructions  Complete learning assessment and assess knowledge base    Interventions:  - Provide teaching at level of understanding  - Provide teaching via preferred learning methods   Outcome: Progressing

## 2017-10-28 NOTE — PROGRESS NOTES
Progress Note - Neurosurgery   Darcie Nguyen 81 y o  female MRN: [id number]  Unit/Bed#: [facility] 920-01 Encounter: [id number]    Assessment:  1  Cervical spinal cord expansile lesion with associated compression and edema  2  Intramedulary and extradural masses of distal spinal cord/filum terminale  3  Diffuse spinal cord signal change from T7-conus medullaris  4  Recurrent metastatic breast invasive ductal carcinoma  5  History of T8 metastasis s/p RT  6  Peripheral neuropathy  7  Hypertension    Plan:  82-year-old female history of breast cancer presents with progressive functional quadriparesis in setting of severe cervical spinal stenosis  Direct admission for further workup and consideration of cervical decompression  · Neuro exam: C3-C6 cord signal change  Intramedullary signal change  · Imaging reviewed personally and with attending and radiologist:  · MRI cervical spine with contrast: expansion of cervical cord C3-6 with 6mm x 9mm enhancing (likely intramedullary mass) at level of C4-5  suboptimal exam due to motion and inability to complete sequences  · MRI thoracic spine: cord signal change from T7 to conus medullaris with mild expansion of spinal cord  2 7 x 1 3cm soft tissue mass arising from conus medullaris (likely intramedullary) in addition to 2 6x1 3 cm mass (likely extradural) adjacent to left lateral conus lesion  Multiple levels of thoracic herniation without significant stenosis  · CT chest abdomen pelvis: left pleural effusion, multiple hepatic lesions, left adrenal mass, multiple bony lesions  · MRI cervical spine wo contrast: Signs of cord signal abnormality and associated expansion with the dominant component extending from C3-C4 to C5-C6   multi level external compression due to disc osteophyte completes/central disc protrusion    · Ongoing mediclal management by primary team, SLIM  · Hematology-Oncology consult requested, appreciate input  · Continue medical management of hypertension  · Pain control: managed by primary team- consider scheduling tylenol, adding oxycodone 5mg PRN moderate pain, 10mg PRN severe pain, dilaudid PRN breakthrough  Patient tolerated PO dilaudid despite allergy to hydrocodone  · Continue subcutaneous heparin, SCDs, and range of motion/mobilization as able for DVT prophylaxis  · Voiding well currently- however, monitor for bowel/bladder dysfunction  · May titrate off decadron as MRI findings are subacute-chronic  · Recommend PT/OT consult  No contraindication to being OOB from nsx standpoint  · Care Everywhere chart reviewed from May 2017 with [facility] oncologist, Dr Abraham Sanabria  · History of stage III, T2 N2 M0, left breast invasive ductal carcinoma, ER positive, GA positive, HER-2/prem negative with 4 out of 13 lymph nodes positive for metastatic carcinoma  · Metastasis to the lymph nodes (cervical, supraclavicular, axillary, mediastinal, upper abdominal and retroperitoneal adenopathy), osseous metastasis and symptomatic T8 lesion (s/p bx and RT)   · Status post biopsy of right axillary lymph node, consistent with metastatic poorly-differentiated carcinoma in 3 lymph nodes, ER/GA negative, and HER-2 negative  · At that time tx was Ixempra 20% dose reduction initiated 5/15/2017, xgeva  · Previously had left breast lumpectomy, 4 cycles of Adriamycin 60 mg/m2 plus Cytoxan 600 mg/m2 given every 3 weeks  Last received 07/2010, 4 cycles of Taxotere at 100 mg/m2 given with growth factor support  Last received 10/04/2010  Femara 2 5 mg daily 02/2011- 10/2016  Radiation to thoracic spine 11/3/16-11/23/16  Taxol 80 mg/m2 initiated 11/21/16  Switched to 3 weeks on 1/3/17- 4/2017  · Discussed with Dr Mellisa Womack  Due to generalized poor prognosis and systemic disease, patient is unlikely to benefit from cervical decompression and fusion for symptomatic improvement  Pain is well controlled and patient has retained bowel/bladder function   New dx of thoracic lesions and thoracic cord edema  Recommend radiation oncology consult for input regarding SRS or other RT options for palliation  Consider palliative care consult  Subjective/Objective   Chief Complaint: " I'm not that good today "    Subjective: Patient reports feeling upset due to new dx of liver mets as Dr Jean-Claude Gay discussed with her  She otherwise feels that her pain is well controlled with the current regimen  Previously located in the neck and across the shoulders  Admits to some stiffness now but is generally comfortable  Patient also admits to LUE radiculopathy which travels from shoulder to elbow and into 3rd and 4th fingers  Admits to decreased sensation in her left arm, also admits to chronic neuropathy in her legs  Patient feels weakness is stable- worst in right leg and left arm  Admits to voiding frequency but no bowel/bladder incontinence or retention  Objective: lying in bed, in NAD    I/O       10/26 0701 - 10/27 0700 10/27 0701 - 10/28 0700 10/28 0701 - 10/29 0700    P  O   200     Total Intake(mL/kg)  200 (3)     Urine (mL/kg/hr)  230     Total Output   230      Net   -30             Unmeasured Urine Occurrence  2 x           Invasive Devices     Peripheral Intravenous Line            Peripheral IV 10/27/17 Right Wrist less than 1 day                Physical Exam:  Vitals: Blood pressure 99/68, pulse 77, temperature 98 1 °F (36 7 °C), temperature source Oral, resp  rate 18, SpO2 97 %  ,There is no height or weight on file to calculate BMI  General appearance: alert, appears stated age, cooperative and no distress  Head: Normocephalic, without obvious abnormality, atraumatic  Eyes: EOMI, PERRL, acuity intact in all fields  Neck: somewhat restricted ROM  Non-tender to palpation    Lungs: non labored breathing  Neurologic:   Mental status: Alert, oriented, thought content appropriate  Cranial nerves: grossly intact (Cranial nerves II-XII)  Sensory: decreased to pinprick throughout entire left arm, but intact in the left hand  Symmetric to light touch in b/l UE  Decreased to light touch in b/l LE distal to knees (history of neuropathy)  Decreased to pinprick sensation in S1 distribution on the left  Diminished discrimination testing in b/l UE  Motor: 5-/5  b/l, 4+/5 throughout proximally, 4/5 abduction/adduction  LLE: 4+/5 HF, HE, KE, DF, PF  RLE: 4/5 HF, HE, DF 3/5 PF  Reflexes: hyporeflexic in b/l LE, 2+ in b/l brachioradialis and biceps  + hoffmans sign on right  No clonus  Coordination: JPS intact in b/l UE  Slowed response time in b/l LE due to neuropathy but 3/3 b/l      Lab Results:    Results from last 7 days  Lab Units 10/28/17  0546 10/27/17  2016   WBC Thousand/uL 3 11* 5 24   HEMOGLOBIN g/dL 11 8 11 2*   HEMATOCRIT % 36 7 34 9   PLATELETS Thousands/uL 265 248   NEUTROS PCT %  --  66   MONOS PCT %  --  9       Results from last 7 days  Lab Units 10/28/17  0546 10/27/17  2016   SODIUM mmol/L 138 140   POTASSIUM mmol/L 3 8 3 8   CHLORIDE mmol/L 105 107   CO2 mmol/L 26 27   BUN mg/dL 6 6   CREATININE mg/dL 0 50* 0 64   CALCIUM mg/dL 8 8 8 5   GLUCOSE RANDOM mg/dL 85 91     Imaging Studies: I have personally reviewed pertinent reports  and I have personally reviewed pertinent films in PACS    EKG, Pathology, and Other Studies: I have personally reviewed pertinent reports        VTE Pharmacologic Prophylaxis: Heparin    VTE Mechanical Prophylaxis: sequential compression device

## 2017-10-28 NOTE — PLAN OF CARE
Problem: Potential for Falls  Goal: Patient will remain free of falls  INTERVENTIONS:  - Assess patient frequently for physical needs  -  Identify cognitive and physical deficits and behaviors that affect risk of falls  -  [place] fall precautions as indicated by assessment   - Educate patient/family on patient safety including physical limitations  - Instruct patient to call for assistance with activity based on assessment  - Modify environment to reduce risk of injury  - Consider OT/PT consult to assist with strengthening/mobility    Outcome: Progressing      Problem: Nutrition/Hydration-ADULT  Goal: Nutrient/Hydration intake appropriate for improving, restoring or maintaining nutritional needs  Monitor and assess patient's nutrition/hydration status for malnutrition (ex- brittle hair, bruises, dry skin, pale skin and conjunctiva, muscle wasting, smooth red tongue, and disorientation)  Collaborate with interdisciplinary team and initiate plan and interventions as ordered  Monitor patient's weight and dietary intake as ordered or per policy  Utilize nutrition screening tool and intervene per policy  Determine patient's food preferences and provide high-protein, high-caloric foods as appropriate       INTERVENTIONS:  - Monitor oral intake, urinary output, labs, and treatment plans  - Assess nutrition and hydration status and recommend course of action  - Evaluate amount of meals eaten  - Assist patient with eating if necessary   - Allow adequate time for meals  - Recommend/ encourage appropriate diets, oral nutritional supplements, and vitamin/mineral supplements  - Order, calculate, and assess calorie counts as needed  - Recommend, monitor, and adjust tube feedings and TPN/PPN based on assessed needs  - Assess need for intravenous fluids  - Provide specific nutrition/hydration education as appropriate  - Include patient/family/caregiver in decisions related to nutrition   Outcome: Progressing      Problem: Prexisting or High Potential for Compromised Skin Integrity  Goal: Skin integrity is maintained or improved  INTERVENTIONS:  - Identify patients at risk for skin breakdown  - Assess and monitor skin integrity  - Assess and monitor nutrition and hydration status  - Monitor labs (i e  albumin)  - Assess for incontinence   - Turn and reposition patient  - Assist with mobility/ambulation  - Relieve pressure over bony prominences  - Avoid friction and shearing  - Provide appropriate hygiene as needed including keeping skin clean and dry  - Evaluate need for skin moisturizer/barrier cream  - Collaborate with interdisciplinary team (i e  Nutrition, Rehabilitation, etc )   - Patient/family teaching   Outcome: Progressing      Problem: PAIN - ADULT  Goal: Verbalizes/displays adequate comfort level or baseline comfort level  Interventions:  - Encourage patient to monitor pain and request assistance  - Assess pain using appropriate pain scale  - Administer analgesics based on type and severity of pain and evaluate response  - Implement non-pharmacological measures as appropriate and evaluate response  - Consider cultural and social influences on pain and pain management  - Notify physician/advanced practitioner if interventions unsuccessful or patient reports new pain   Outcome: Progressing      Problem: INFECTION - ADULT  Goal: Absence or prevention of progression during hospitalization  INTERVENTIONS:  - Assess and monitor for signs and symptoms of infection  - Monitor lab/diagnostic results  - Monitor all insertion sites, i e  indwelling lines, tubes, and drains  - Monitor endotracheal (as able) and nasal secretions for changes in amount and color  - [place] appropriate cooling/warming therapies per order  - Administer medications as ordered  - Instruct and encourage patient and family to use good hand hygiene technique  - Identify and instruct in appropriate isolation precautions for identified infection/condition Outcome: Progressing    Goal: Absence of fever/infection during neutropenic period  INTERVENTIONS:  - Monitor WBC  - Implement neutropenic guidelines   Outcome: Progressing      Problem: SAFETY ADULT  Goal: Maintain or return to baseline ADL function  INTERVENTIONS:  -  Assess patient's ability to carry out ADLs; assess patient's baseline for ADL function and identify physical deficits which impact ability to perform ADLs (bathing, care of mouth/teeth, toileting, grooming, dressing, etc )  - Assess/evaluate cause of self-care deficits   - Assess range of motion  - Assess patient's mobility; develop plan if impaired  - Assess patient's need for assistive devices and provide as appropriate  - Encourage maximum independence but intervene and supervise when necessary  ¯ Involve family in performance of ADLs  ¯ Assess for home care needs following discharge   ¯ Request OT consult to assist with ADL evaluation and planning for discharge  ¯ Provide patient education as appropriate   Outcome: Progressing    Goal: Maintain or return mobility status to optimal level  INTERVENTIONS:  - Assess patient's baseline mobility status (ambulation, transfers, stairs, etc )    - Identify cognitive and physical deficits and behaviors that affect mobility  - Identify mobility aids required to assist with transfers and/or ambulation (gait belt, sit-to-stand, lift, walker, cane, etc )  - [place] fall precautions as indicated by assessment  - Record patient progress and toleration of activity level on Mobility SBAR; progress patient to next Phase/Stage  - Instruct patient to call for assistance with activity based on assessment  - Request Rehabilitation consult to assist with strengthening/weightbearing, etc    Outcome: Progressing    Goal: Patient will remain free of falls  INTERVENTIONS:  - Assess patient frequently for physical needs  -  Identify cognitive and physical deficits and behaviors that affect risk of falls    -  [place] fall precautions as indicated by assessment   - Educate patient/family on patient safety including physical limitations  - Instruct patient to call for assistance with activity based on assessment  - Modify environment to reduce risk of injury  - Consider OT/PT consult to assist with strengthening/mobility    Outcome: Progressing      Problem: DISCHARGE PLANNING  Goal: Discharge to home or other facility with appropriate resources  INTERVENTIONS:  - Identify barriers to discharge w/patient and caregiver  - Arrange for needed discharge resources and transportation as appropriate  - Identify discharge learning needs (meds, wound care, etc )  - Arrange for interpretive services to assist at discharge as needed  - Refer to Case Management Department for coordinating discharge planning if the patient needs post-hospital services based on physician/advanced practitioner order or complex needs related to functional status, cognitive ability, or social support system   Outcome: Progressing      Problem: Knowledge Deficit  Goal: Patient/family/caregiver demonstrates understanding of disease process, treatment plan, medications, and discharge instructions  Complete learning assessment and assess knowledge base    Interventions:  - Provide teaching at level of understanding  - Provide teaching via preferred learning methods   Outcome: Progressing

## 2017-10-29 LAB
ANION GAP SERPL CALCULATED.3IONS-SCNC: 7 MMOL/L (ref 4–13)
BUN SERPL-MCNC: 11 MG/DL (ref 5–25)
CALCIUM SERPL-MCNC: 8.7 MG/DL (ref 8.3–10.1)
CANCER AG27-29 SERPL-ACNC: 83.7 U/ML (ref 0–42.3)
CHLORIDE SERPL-SCNC: 107 MMOL/L (ref 100–108)
CO2 SERPL-SCNC: 25 MMOL/L (ref 21–32)
CREAT SERPL-MCNC: 0.58 MG/DL (ref 0.6–1.3)
GFR SERPL CREATININE-BSD FRML MDRD: 108 ML/MIN/1.73SQ M
GLUCOSE SERPL-MCNC: 120 MG/DL (ref 65–140)
POTASSIUM SERPL-SCNC: 3.8 MMOL/L (ref 3.5–5.3)
SODIUM SERPL-SCNC: 139 MMOL/L (ref 136–145)

## 2017-10-29 PROCEDURE — 80048 BASIC METABOLIC PNL TOTAL CA: CPT | Performed by: INTERNAL MEDICINE

## 2017-10-29 RX ADMIN — MORPHINE SULFATE 15 MG: 15 TABLET, EXTENDED RELEASE ORAL at 17:32

## 2017-10-29 RX ADMIN — OXYCODONE HYDROCHLORIDE 10 MG: 10 TABLET ORAL at 10:10

## 2017-10-29 RX ADMIN — HEPARIN SODIUM 5000 UNITS: 5000 INJECTION, SOLUTION INTRAVENOUS; SUBCUTANEOUS at 05:30

## 2017-10-29 RX ADMIN — LIDOCAINE 1 PATCH: 50 PATCH TOPICAL at 10:09

## 2017-10-29 RX ADMIN — HEPARIN SODIUM 5000 UNITS: 5000 INJECTION, SOLUTION INTRAVENOUS; SUBCUTANEOUS at 22:19

## 2017-10-29 RX ADMIN — ACETAMINOPHEN 975 MG: 325 TABLET, FILM COATED ORAL at 05:30

## 2017-10-29 RX ADMIN — LEVOTHYROXINE SODIUM 100 MCG: 100 TABLET ORAL at 05:30

## 2017-10-29 RX ADMIN — MORPHINE SULFATE 15 MG: 15 TABLET, EXTENDED RELEASE ORAL at 10:06

## 2017-10-29 RX ADMIN — PREGABALIN 50 MG: 25 CAPSULE ORAL at 10:06

## 2017-10-29 RX ADMIN — AMLODIPINE BESYLATE 5 MG: 5 TABLET ORAL at 10:07

## 2017-10-29 RX ADMIN — Medication 1 TABLET: at 22:10

## 2017-10-29 RX ADMIN — ACETAMINOPHEN 975 MG: 325 TABLET, FILM COATED ORAL at 22:09

## 2017-10-29 RX ADMIN — ACETAMINOPHEN 975 MG: 325 TABLET, FILM COATED ORAL at 15:21

## 2017-10-29 RX ADMIN — DEXAMETHASONE 4 MG: 4 TABLET ORAL at 10:06

## 2017-10-29 RX ADMIN — HEPARIN SODIUM 5000 UNITS: 5000 INJECTION, SOLUTION INTRAVENOUS; SUBCUTANEOUS at 15:22

## 2017-10-29 RX ADMIN — PREGABALIN 50 MG: 25 CAPSULE ORAL at 17:29

## 2017-10-29 RX ADMIN — PREGABALIN 50 MG: 25 CAPSULE ORAL at 22:10
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
## 2017-10-29 NOTE — PROGRESS NOTES
[facility] Internal [facility] Progress Note  Patient: Milton Scott 81 y o  female   MRN: [id number]  PCP: Laine Campos MD  Unit/Bed#: [facility] 920-01 Encounter: [id number]  Date Of Visit: 10/29/17    Assessment/Plan:  · Cervical cord compression with myelopathy ([facility] [address] ) - likely related to metastatic disease  Discussed with Neurosurgery  No immediate plans for surgical intervention at this time  Radiation oncology consult will be obtained per neurosurgical/Oncology recommendations  · Functional quadriparesis - in the setting of above  She does have good muscle strength in lower extremities  Will obtain PT eval  · Metastatic Breast cancer ([facility] [address] ) - CT of the chest abdomen and pelvis reveals moderate-sized left pleural effusion, multiple hepatic lesions and abnormal appearance of the left adrenal gland suspicious for metastatic disease  Also with bilateral soft tissue masses in the lungs  Patient with an overall poor prognosis  She will follow up with her medical oncologist at the [address] [place] [address] upon discharge  · Moderate left pleural effusion - likely malignant effusion  Patient's respiratory status is stable without immediate need for thoracentesis at this time  · Essential hypertension - resume home medications  · Hypothyroidism - continue levothyroxine    VTE Pharmacologic Prophylaxis:   Pharmacologic: Heparin  Mechanical VTE Prophylaxis in Place: Yes    Patient Centered Rounds: I have performed bedside rounds with nursing staff today  Discussions with Specialists or Other Care Team Provider:  Nursing/neurosurgery    Education and Discussions with Family / Patient:  Discussed with patient and daughter at the bedside  All questions answered  She did expressed that if any surgery is required, she will rather have that done with her surgeon at the [address] [place] [address] in [place]      Current Length of Stay: 2 day(s)    Current Patient Status: Inpatient   Certification Statement: The patient will continue to require additional inpatient hospital stay due to Cervical cord compression/functional quadriparesis    Discharge Plan:  Not ready for discharge    Code Status: Level 1 - Full Code      Subjective:   Patient seen and evaluated  Reports she had a good night although did not sleep well  Denies any pain, any fever or chills, no new weakness  Objective:     Vitals:   Temp (24hrs), Av °F (36 7 °C), Min:97 8 °F (36 6 °C), Max:98 7 °F (37 1 °C)    HR:  [74-95] 75  Resp:  [18] 18  BP: (102-138)/(56-94) 113/74  SpO2:  [98 %-99 %] 99 %  There is no height or weight on file to calculate BMI  Input and Output Summary (last 24 hours):        Intake/Output Summary (Last 24 hours) at 10/29/17 1058  Last data filed at 10/29/17 0207   Gross per 24 hour   Intake              418 ml   Output             1473 ml   Net            -1055 ml       Physical Exam:     General Appearance:    Alert, cooperative, no distress, appropriately responsive    Head:    Normocephalic, without obvious abnormality, atraumatic, mucous membranes moist    Eyes:   Conjunctiva/corneas clear, EOM's intact   Neck:   Supple   Lungs:     Clear to auscultation bilaterally, respirations unlabored, no crackles or wheeze     Heart:    Regular rate and rhythm, S1 and S2    Abdomen:     Soft, non-tender, bowel sounds active all four quadrants,     no masses, no organomegaly   Extremities:   Extremities normal, atraumatic, no cyanosis or edema   Neurologic:  Chronic bilateral upper extremity tremors which is stable, MS 5/5 LLE, 4/5 RLE         Additional Data:     Labs:      Results from last 7 days  Lab Units 10/28/17  0546 10/27/17  2016   WBC Thousand/uL 3 11* 5 24   HEMOGLOBIN g/dL 11 8 11 2*   HEMATOCRIT % 36 7 34 9   PLATELETS Thousands/uL 265 248   NEUTROS PCT %  --  66   LYMPHS PCT %  --  24   MONOS PCT %  --  9   EOS PCT %  --  1       Results from last 7 days  Lab Units 10/29/17  0457   SODIUM mmol/L 139 POTASSIUM mmol/L 3 8   CHLORIDE mmol/L 107   CO2 mmol/L 25   BUN mg/dL 11   CREATININE mg/dL 0 58*   CALCIUM mg/dL 8 7   GLUCOSE RANDOM mg/dL 120           * I Have Reviewed All Lab Data Listed Above  * Additional Pertinent Lab Tests Reviewed: All Labs Within Last 24 Hours Reviewed    Cultures:   Blood Culture: No results found for: BLOODCX  Urine Culture: No results found for: URINECX  Sputum Culture: No components found for: SPUTUMCX  Wound Culture: No results found for: WOUNDCULT    Last 24 Hours Medication List:     acetaminophen 975 mg Oral Q8H [address]   amLODIPine 5 mg Oral Daily   dexamethasone 4 mg Oral Daily   heparin (porcine) 5,000 Units Subcutaneous Q8H [address]   levothyroxine 100 mcg Oral Early Morning   lidocaine 1 patch Transdermal Daily   morphine 15 mg Oral BID   pregabalin 50 mg Oral TID   senna-docusate sodium 1 tablet Oral HS        Today, Patient Was Seen By: Mark Parker MD    ** Please Note: Dragon 360 Dictation voice to text software may have been used in the creation of this document   **

## 2017-10-29 NOTE — PROGRESS NOTES
Progress Note - Neurosurgery   Raymon Camp 81 y o  female MRN: [id number]  Unit/Bed#: [facility] 920-01 Encounter: [id number]    Assessment:  1  Cervical spinal cord expansile lesion with associated compression and edema  2  Intramedulary and extradural masses of distal spinal cord/filum terminale  3  Diffuse spinal cord signal change from T7-conus medullaris  4  Recurrent metastatic breast invasive ductal carcinoma  5  History of T8 metastasis s/p RT  6  Peripheral neuropathy  7  Hypertension    Plan:  82-year-old female history of breast cancer presents with progressive functional quadriparesis in setting of severe cervical spinal stenosis  Direct admission for further workup  · Neuro exam: overall good strength, with focal weakness in left HE, KF, and PF  · Imaging reviewed personally and with attending and radiologist:  · MRI cervical spine with contrast: expansion of cervical cord C3-6 with 6mm x 9mm enhancing (likely intramedullary mass) at level of C4-5  suboptimal exam due to motion and inability to complete sequences  · MRI thoracic spine: cord signal change from T7 to conus medullaris with mild expansion of spinal cord  2 7 x 1 3cm soft tissue mass arising from conus medullaris (likely intramedullary) in addition to 2 6x1 3 cm mass (likely extradural) adjacent to left lateral conus lesion  Multiple levels of thoracic herniation without significant stenosis  · CT chest abdomen pelvis: left pleural effusion, multiple hepatic lesions, left adrenal mass, multiple bony lesions  · MRI cervical spine wo contrast: Signs of cord signal abnormality and associated expansion with the dominant component extending from C3-C4 to C5-C6   multi level external compression due to disc osteophyte completes/central disc protrusion    · Ongoing mediclal management by primary team, SLIM  · Hematology-Oncology consult requested, appreciate input  · Appreciate radiation oncology consult for possible RT to spinal cord lesions as surgical intervention likely not offered  Consider increasing decadron to 4Q6 to prepare for RT as preliminarily discussed with Dr Thomas Easley  · Continue medical management of hypertension  · Pain control: managed by primary team  · Continue subcutaneous heparin, SCDs, and range of motion/mobilization as able for DVT prophylaxis  · Voiding well currently- however, monitor for bowel/bladder dysfunction  · Recommend PT/OT consult  No contraindication to being OOB from nsx standpoint  · Consider UA as patient complaining of urinary frequency and urgency  · Care Everywhere chart reviewed from May 2017 with [facility] oncologist, Dr Joan Mitchell  · History of stage III, T2 N2 M0, left breast invasive ductal carcinoma, ER positive, NH positive, HER-2/prem negative with 4 out of 13 lymph nodes positive for metastatic carcinoma  · Metastasis to the lymph nodes (cervical, supraclavicular, axillary, mediastinal, upper abdominal and retroperitoneal adenopathy), osseous metastasis and symptomatic T8 lesion (s/p bx and RT)   · Status post biopsy of right axillary lymph node, consistent with metastatic poorly-differentiated carcinoma in 3 lymph nodes, ER/NH negative, and HER-2 negative  · At that time tx was Ixempra 20% dose reduction initiated 5/15/2017, xgeva  · Previously had left breast lumpectomy, 4 cycles of Adriamycin 60 mg/m2 plus Cytoxan 600 mg/m2 given every 3 weeks  Last received 07/2010, 4 cycles of Taxotere at 100 mg/m2 given with growth factor support  Last received 10/04/2010  Femara 2 5 mg daily 02/2011- 10/2016  Radiation to thoracic spine 11/3/16-11/23/16  Taxol 80 mg/m2 initiated 11/21/16  Switched to 3 weeks on 1/3/17- 4/2017  · Per patient, had recent RT to clavicular region, but not c-spine  · Discussed with Dr Jose Antonio Bolton  Due to generalized poor prognosis and systemic disease, patient is unlikely to benefit from cervical decompression and fusion for symptomatic improvement   Pain is well controlled and patient has retained bowel/bladder function  New dx of thoracic lesions and thoracic cord edema  No neurosurgical intervention indicated  Follow-up on rad-onc consult for palliative RT  Patient requests CDs to be made of in-house imaging to take to her neuro-oncology surgeon, Dr Dustin Hansen     Subjective/Objective   Chief Complaint: " I'm alright "    Subjective: Patient reports improved neck pain since admission  States her LUE numbness is also improved  Does not note subjective improvement in weakness since admission  Admits to frequency of urine and urgency, causing incontinence  Denies numbness of groin or LE with exception to chronic peripheral neuropathy  Objective: lying in bed, in NAD    I/O       10/27 0701 - 10/28 0700 10/28 0701 - 10/29 0700 10/29 0701 - 10/30 0700    P  O  200 418     Total Intake(mL/kg) 200 (3) 418 (6 4)     Urine (mL/kg/hr) 230 1673 (1 1)     Total Output 230 1673      Net -30 -1255             Unmeasured Urine Occurrence 2 x            Invasive Devices     Peripheral Intravenous Line            Peripheral IV 10/27/17 Right Wrist 1 day                Physical Exam:  Vitals: Blood pressure 115/65, pulse 75, temperature 97 8 °F (36 6 °C), temperature source Oral, resp  rate 18, SpO2 99 %  ,There is no height or weight on file to calculate BMI        General appearance: alert, appears stated age, cooperative and no distress  Head: Normocephalic, without obvious abnormality, atraumatic  Eyes: EOMI  Neck: non-tender to posterior palpation  Lungs: non labored breathing  Neurologic:   Mental status: Alert, oriented, thought content appropriate  Cranial nerves: grossly intact (Cranial nerves II-XII)  Sensory: normal to light touch and pinprick in b/l UE and LE with exception to baseline peripheral neuropathy  Motor: 5-/5 in b/l UE and LE with exception to right sided 4/5 HE, KF, and 3-/5 PF  Reflexes: 2+ in b/l UE and 1+ in b/l LE no clonus, improved patel in UE  Coordination: JPS in left foot 3/3 at hallux  JPS errors in right foot and hallux  Lab Results:    Results from last 7 days  Lab Units 10/28/17  0546 10/27/17  2016   WBC Thousand/uL 3 11* 5 24   HEMOGLOBIN g/dL 11 8 11 2*   HEMATOCRIT % 36 7 34 9   PLATELETS Thousands/uL 265 248   NEUTROS PCT %  --  66   MONOS PCT %  --  9       Results from last 7 days  Lab Units 10/29/17  0457 10/28/17  0546 10/27/17  2016   SODIUM mmol/L 139 138 140   POTASSIUM mmol/L 3 8 3 8 3 8   CHLORIDE mmol/L 107 105 107   CO2 mmol/L 25 26 27   BUN mg/dL 11 6 6   CREATININE mg/dL 0 58* 0 50* 0 64   CALCIUM mg/dL 8 7 8 8 8 5   GLUCOSE RANDOM mg/dL 120 85 91       Imaging Studies: I have personally reviewed pertinent reports  and I have personally reviewed pertinent films in PACS    EKG, Pathology, and Other Studies: I have personally reviewed pertinent reports        VTE Pharmacologic Prophylaxis: Heparin    VTE Mechanical Prophylaxis: sequential compression device and foot pump applied

## 2017-10-29 NOTE — CONSULTS
Consultation - Radiation Oncology   Cristobal Womack 81 y o  female MRN: [id number]  [facility] Encounter: [id number]        History of Present Illness   Physician Requesting Consult: Vanna Closs, MD  Reason for Consult / Principal Problem: Difficulty walking, progressive metastatic disease in Cervical and Thoraco-lumbar spine  Hx and PE limited by: Extensive outside records, unavailable at this time  HPI: Rian Jeffers is a 81y o  year old female with a history of metastatic breast cancer, originally diagnosed with T2N2M0 ER/HI+, HER-2 negative left breast ca in 2009, s/p lumpectomy and adjuvant AC+T, followed by XRT to breast and L supraclavicular region  This was performed at [facility] or [facility]  Then found in 2016 to have right axillary lymph node, metastatic breast ca, triple negative  Has received XRT to right axilla at [facility], palliative XRT to thoracic spine at [facility], as well as palliative XRT to left neck region, at [facility]  Has been on various systemic therapies over the past year, most recently single agent Xeloda under the care of [facility]  She then presented to Neurology/Neurosurgery on 10/27/17 as an outpt, complaining of increasing difficulty ambulating over the past week, with weakness of the upper and lower extremities  MRI cervical and thoracic spine were performed  No MRI's of the brain or lumbar spine  MRI cervical spine with contrast revealed mild expansion of the cervical cord from C3 to C6 with mild enhancement of the right lateral cord at C4-C5 level measuring 6x6x9 mm, possible representing an intramedullary metastasis  Thoracic MRI revealed:  IMPRESSION:  1  Suboptimal examination due to patient motion  2   Diffuse abnormal cord signal with mild expansion of the spinal cord extending from T7-T8 inferiorly to the conus medullaris  No abnormal enhancement of the spinal cord within this region  Although the findings may represent the sequela of prior   radiation therapy (radiation myelitis) the overall extent appears somewhat greater in which is usually seen  Metastatic disease not excluded  3   Heterogeneously enhancing, expansile soft tissue mass involving the distal spinal cord/filum terminale  Findings most compatible with metastatic disease  4   Extradural enhancing soft tissue mass posterior to the L1 vertebral body  Findings most compatible with metastatic disease  5   Abnormal appearance of the T7 and T8 vertebral bodies, most compatible with the history of metastatic disease  CT of the C/A/P was also performed, revealing:  IMPRESSION:  1  Bilateral soft tissue masses with intervening air bronchograms in the lungs bilaterally, largest in the left upper lobe  Differential includes multifocal pneumonia versus pulmonary neoplasm  These do not have a typical appearance for metastatic   disease  2   Moderate-sized left pleural effusion with compressive atelectasis left lower lobe  3   Multiple hepatic lesions, suspicious for metastatic disease  4   Abnormal appearance of the left adrenal gland suspicious for metastatic disease  5   Multiple bony lesions, consistent with the known history of metastatic disease  Neurosurgery was consulted regarding the cervical and thoracic lesions, and no surgical intervention is recommended at this time  Inpatient consult to Radiation Oncology  Consult performed by: Judi Hansen  Consult ordered by: Jaqueline Styles          Review of Systems   HENT: Negative  Eyes: Negative  Respiratory: Negative  Cardiovascular: Negative  Gastrointestinal: Negative  Genitourinary: Positive for frequency  Denies johan incontinence or urinary retention  Musculoskeletal:        Left shoulder discomfort  Skin: Negative  Neurological: Positive for weakness and numbness  Hematological: Negative  Psychiatric/Behavioral: Negative  Historical Information   Previous Oncology History: As above  Past Medical History:   Diagnosis Date    Breast cancer ([facility] [address] )     Disease of thyroid gland      No past surgical history on file  OB/GYN History: na  No family history on file  Social History   History   Alcohol Use No     History   Drug Use No     History   Smoking Status    Never Smoker   Smokeless Tobacco    Not on file       Meds/Allergies   all current active meds have been reviewed    Allergies   Allergen Reactions    Gabapentin GI Intolerance    Hydrocodone GI Intolerance    Ibuprofen Rash       Objective     Intake/Output Summary (Last 24 hours) at 10/29/17 1617  Last data filed at 10/29/17 0300   Gross per 24 hour   Intake              418 ml   Output              935 ml   Net             -517 ml     Invasive Devices     Peripheral Intravenous Line            Peripheral IV 10/27/17 Right Wrist 1 day              Physical Exam   Constitutional: She is oriented to person, place, and time  She appears well-developed and well-nourished  No distress  HENT:   Head: Normocephalic  Eyes: Conjunctivae are normal  No scleral icterus  Neck: Normal range of motion  Pulmonary/Chest: Effort normal  No respiratory distress  Abdominal: Soft  Musculoskeletal: Normal range of motion  Neurological: She is alert and oriented to person, place, and time  No cranial nerve deficit  5/5 strength upper extremities with exception of 4/5 left tricep extension  4+ - 5/5 LE bilaterally  Skin: Skin is warm and dry  Psychiatric: She has a normal mood and affect  Judgment and thought content normal        Lab Results: I have personally reviewed pertinent reports  Imaging Studies: I have personally reviewed pertinent films in PACS  EKG, Pathology, and Other Studies: I have personally reviewed pertinent reports        Assessment/Plan     Assessment:  Ms Kevin Dozier is a 81year old female with history of metastatic breast cancer, status-post prior radiation at 4 separate outside facilities, including L breast and sclv RT at [facility], T-spine RT at [facility], R axillary RT at [facility], and L Neck RT at [facility] this past summer  She currently is admitted secondary to what is being described as functional quadriplegia  Her extremity strength on exam is fairly good, but she is unable to ambulate under her own power, and this is a significant decline that occurred over the past 10 days  Imaging reveals what appears to be an intramedullary C-spine lesion at C4/5, with expansion of the cervical cord from C3-C6  She also has a heterogeneous enhancing soft tissue mass involving the distal spinal cord/filum terminale as well as an enhancing soft tissue mass arising from the left lateral asepct of the conus lesion  The lumbar spine was not imaged, and the brain has not been imaged  Plan:  As per the note from medical oncology, her overall prognosis is guarded  At this time, we recommend increasing her dexamethasone from 4 mg qday to 4 mg q 6 hrs  We would also recommend imaging of the brain with MRI with contrast and MRI L spine with and without contrast   We will have to obtain her prior RT records tomorrow AM, and will proceed with CT simulation in the AM   Depending on her outside RT records, we may be able to offer palliative treatment to the cervical and low thoracic/upper lumbar spine  The patient understands that while the radiation may be able to slow progression of disease at these locations, whether or not she will regain the ability to ambulate under her own power remains unclear    We would also recommend PT/OT eval       Code Status: Level 1 - Full Code

## 2017-10-30 ENCOUNTER — APPOINTMENT (INPATIENT)
Dept: RADIOLOGY | Facility: HOSPITAL | Age: 72
DRG: 542 | End: 2017-10-30
Payer: MEDICARE

## 2017-10-30 ENCOUNTER — APPOINTMENT (INPATIENT)
Dept: RADIOLOGY | Facility: HOSPITAL | Age: 72
DRG: 542 | End: 2017-10-30
Attending: RADIOLOGY
Payer: MEDICARE

## 2017-10-30 ENCOUNTER — TRANSCRIBE ORDERS (OUTPATIENT)
Dept: OTHER | Facility: HOSPITAL | Age: 72
End: 2017-10-30

## 2017-10-30 DIAGNOSIS — C79.49 METASTASIS TO SPINAL CORD (HCC): Primary | ICD-10-CM

## 2017-10-30 PROCEDURE — D0061ZZ BEAM RADIATION OF SPINAL CORD USING PHOTONS 1 - 10 MEV: ICD-10-PCS | Performed by: HOSPITALIST

## 2017-10-30 PROCEDURE — 70553 MRI BRAIN STEM W/O & W/DYE: CPT

## 2017-10-30 PROCEDURE — 77290 THER RAD SIMULAJ FIELD CPLX: CPT | Performed by: RADIOLOGY

## 2017-10-30 PROCEDURE — A9585 GADOBUTROL INJECTION: HCPCS | Performed by: INTERNAL MEDICINE

## 2017-10-30 PROCEDURE — 77334 RADIATION TREATMENT AID(S): CPT | Performed by: RADIOLOGY

## 2017-10-30 PROCEDURE — G8978 MOBILITY CURRENT STATUS: HCPCS

## 2017-10-30 PROCEDURE — 97163 PT EVAL HIGH COMPLEX 45 MIN: CPT

## 2017-10-30 PROCEDURE — G8979 MOBILITY GOAL STATUS: HCPCS

## 2017-10-30 PROCEDURE — 77014 HB CT SCAN FOR THERAPY GUIDE: CPT

## 2017-10-30 PROCEDURE — 72158 MRI LUMBAR SPINE W/O & W/DYE: CPT

## 2017-10-30 RX ORDER — LORAZEPAM 2 MG/ML
0.5 INJECTION INTRAMUSCULAR ONCE
Status: COMPLETED | OUTPATIENT
Start: 2017-10-30 | End: 2017-10-30

## 2017-10-30 RX ORDER — LACTULOSE 20 G/30ML
20 SOLUTION ORAL DAILY
Status: DISCONTINUED | OUTPATIENT
Start: 2017-10-30 | End: 2017-11-02 | Stop reason: HOSPADM

## 2017-10-30 RX ORDER — DEXAMETHASONE 4 MG/1
4 TABLET ORAL EVERY 6 HOURS SCHEDULED
Status: DISCONTINUED | OUTPATIENT
Start: 2017-10-30 | End: 2017-11-02 | Stop reason: HOSPADM

## 2017-10-30 RX ADMIN — Medication 1 TABLET: at 23:52

## 2017-10-30 RX ADMIN — ACETAMINOPHEN 975 MG: 325 TABLET, FILM COATED ORAL at 23:52

## 2017-10-30 RX ADMIN — MORPHINE SULFATE 15 MG: 15 TABLET, EXTENDED RELEASE ORAL at 17:25

## 2017-10-30 RX ADMIN — DEXAMETHASONE 4 MG: 4 TABLET ORAL at 13:44

## 2017-10-30 RX ADMIN — HEPARIN SODIUM 5000 UNITS: 5000 INJECTION, SOLUTION INTRAVENOUS; SUBCUTANEOUS at 05:47

## 2017-10-30 RX ADMIN — LORAZEPAM 0.5 MG: 2 INJECTION INTRAMUSCULAR; INTRAVENOUS at 21:23

## 2017-10-30 RX ADMIN — DEXAMETHASONE 4 MG: 4 TABLET ORAL at 23:52

## 2017-10-30 RX ADMIN — HEPARIN SODIUM 5000 UNITS: 5000 INJECTION, SOLUTION INTRAVENOUS; SUBCUTANEOUS at 23:52

## 2017-10-30 RX ADMIN — OXYCODONE HYDROCHLORIDE 10 MG: 10 TABLET ORAL at 03:31

## 2017-10-30 RX ADMIN — PREGABALIN 50 MG: 25 CAPSULE ORAL at 15:52

## 2017-10-30 RX ADMIN — DEXAMETHASONE 4 MG: 4 TABLET ORAL at 17:26

## 2017-10-30 RX ADMIN — GADOBUTROL 6 ML: 604.72 INJECTION INTRAVENOUS at 22:40

## 2017-10-30 RX ADMIN — HYDROMORPHONE HYDROCHLORIDE 0.2 MG: 1 INJECTION, SOLUTION INTRAMUSCULAR; INTRAVENOUS; SUBCUTANEOUS at 21:22

## 2017-10-30 RX ADMIN — HEPARIN SODIUM 5000 UNITS: 5000 INJECTION, SOLUTION INTRAVENOUS; SUBCUTANEOUS at 13:49

## 2017-10-30 RX ADMIN — PREGABALIN 50 MG: 25 CAPSULE ORAL at 08:35

## 2017-10-30 RX ADMIN — LEVOTHYROXINE SODIUM 100 MCG: 100 TABLET ORAL at 05:46

## 2017-10-30 RX ADMIN — MORPHINE SULFATE 15 MG: 15 TABLET, EXTENDED RELEASE ORAL at 08:39

## 2017-10-30 RX ADMIN — ACETAMINOPHEN 975 MG: 325 TABLET, FILM COATED ORAL at 13:42

## 2017-10-30 RX ADMIN — HYDROMORPHONE HYDROCHLORIDE 0.2 MG: 1 INJECTION, SOLUTION INTRAMUSCULAR; INTRAVENOUS; SUBCUTANEOUS at 11:01

## 2017-10-30 RX ADMIN — PREGABALIN 50 MG: 25 CAPSULE ORAL at 23:52

## 2017-10-30 RX ADMIN — ACETAMINOPHEN 975 MG: 325 TABLET, FILM COATED ORAL at 05:45

## 2017-10-30 RX ADMIN — AMLODIPINE BESYLATE 5 MG: 5 TABLET ORAL at 08:39

## 2017-10-30 RX ADMIN — OXYCODONE HYDROCHLORIDE 10 MG: 10 TABLET ORAL at 11:03

## 2017-10-30 RX ADMIN — LACTULOSE 20 G: 20 SOLUTION ORAL at 13:45

## 2017-10-30 NOTE — OCCUPATIONAL THERAPY NOTE
OCCUPATIONAL THERAPY CANCEL NOTE:    ORDERS RECEIVED  CHART REVIEW COMPLETED  ATTEMPTED TO SEE PT HOWEVER PT OFF THE FLOOR FOR RADIATION  WILL CONTINUE TO FOLLOW  TO COMPLETE OT EVAL AS MEDICALLY APPROPRIATE/ABLE       Lady Underwood, MOT, OTR/L

## 2017-10-30 NOTE — PLAN OF CARE
Problem: Potential for Falls  Goal: Patient will remain free of falls  INTERVENTIONS:  - Assess patient frequently for physical needs  -  Identify cognitive and physical deficits and behaviors that affect risk of falls  -  [place] fall precautions as indicated by assessment   - Educate patient/family on patient safety including physical limitations  - Instruct patient to call for assistance with activity based on assessment  - Modify environment to reduce risk of injury  - Consider OT/PT consult to assist with strengthening/mobility    Outcome: Progressing      Problem: Nutrition/Hydration-ADULT  Goal: Nutrient/Hydration intake appropriate for improving, restoring or maintaining nutritional needs  Monitor and assess patient's nutrition/hydration status for malnutrition (ex- brittle hair, bruises, dry skin, pale skin and conjunctiva, muscle wasting, smooth red tongue, and disorientation)  Collaborate with interdisciplinary team and initiate plan and interventions as ordered  Monitor patient's weight and dietary intake as ordered or per policy  Utilize nutrition screening tool and intervene per policy  Determine patient's food preferences and provide high-protein, high-caloric foods as appropriate       INTERVENTIONS:  - Monitor oral intake, urinary output, labs, and treatment plans  - Assess nutrition and hydration status and recommend course of action  - Evaluate amount of meals eaten  - Assist patient with eating if necessary   - Allow adequate time for meals  - Recommend/ encourage appropriate diets, oral nutritional supplements, and vitamin/mineral supplements  - Order, calculate, and assess calorie counts as needed  - Recommend, monitor, and adjust tube feedings and TPN/PPN based on assessed needs  - Assess need for intravenous fluids  - Provide specific nutrition/hydration education as appropriate  - Include patient/family/caregiver in decisions related to nutrition   Outcome: Progressing      Problem: Prexisting or High Potential for Compromised Skin Integrity  Goal: Skin integrity is maintained or improved  INTERVENTIONS:  - Identify patients at risk for skin breakdown  - Assess and monitor skin integrity  - Assess and monitor nutrition and hydration status  - Monitor labs (i e  albumin)  - Assess for incontinence   - Turn and reposition patient  - Assist with mobility/ambulation  - Relieve pressure over bony prominences  - Avoid friction and shearing  - Provide appropriate hygiene as needed including keeping skin clean and dry  - Evaluate need for skin moisturizer/barrier cream  - Collaborate with interdisciplinary team (i e  Nutrition, Rehabilitation, etc )   - Patient/family teaching   Outcome: Progressing      Problem: PAIN - ADULT  Goal: Verbalizes/displays adequate comfort level or baseline comfort level  Interventions:  - Encourage patient to monitor pain and request assistance  - Assess pain using appropriate pain scale  - Administer analgesics based on type and severity of pain and evaluate response  - Implement non-pharmacological measures as appropriate and evaluate response  - Consider cultural and social influences on pain and pain management  - Notify physician/advanced practitioner if interventions unsuccessful or patient reports new pain   Outcome: Progressing      Problem: INFECTION - ADULT  Goal: Absence or prevention of progression during hospitalization  INTERVENTIONS:  - Assess and monitor for signs and symptoms of infection  - Monitor lab/diagnostic results  - Monitor all insertion sites, i e  indwelling lines, tubes, and drains  - Monitor endotracheal (as able) and nasal secretions for changes in amount and color  - [place] appropriate cooling/warming therapies per order  - Administer medications as ordered  - Instruct and encourage patient and family to use good hand hygiene technique  - Identify and instruct in appropriate isolation precautions for identified infection/condition Outcome: Progressing    Goal: Absence of fever/infection during neutropenic period  INTERVENTIONS:  - Monitor WBC  - Implement neutropenic guidelines   Outcome: Progressing      Problem: SAFETY ADULT  Goal: Maintain or return to baseline ADL function  INTERVENTIONS:  -  Assess patient's ability to carry out ADLs; assess patient's baseline for ADL function and identify physical deficits which impact ability to perform ADLs (bathing, care of mouth/teeth, toileting, grooming, dressing, etc )  - Assess/evaluate cause of self-care deficits   - Assess range of motion  - Assess patient's mobility; develop plan if impaired  - Assess patient's need for assistive devices and provide as appropriate  - Encourage maximum independence but intervene and supervise when necessary  ¯ Involve family in performance of ADLs  ¯ Assess for home care needs following discharge   ¯ Request OT consult to assist with ADL evaluation and planning for discharge  ¯ Provide patient education as appropriate   Outcome: Progressing    Goal: Maintain or return mobility status to optimal level  INTERVENTIONS:  - Assess patient's baseline mobility status (ambulation, transfers, stairs, etc )    - Identify cognitive and physical deficits and behaviors that affect mobility  - Identify mobility aids required to assist with transfers and/or ambulation (gait belt, sit-to-stand, lift, walker, cane, etc )  - [place] fall precautions as indicated by assessment  - Record patient progress and toleration of activity level on Mobility SBAR; progress patient to next Phase/Stage  - Instruct patient to call for assistance with activity based on assessment  - Request Rehabilitation consult to assist with strengthening/weightbearing, etc    Outcome: Progressing    Goal: Patient will remain free of falls  INTERVENTIONS:  - Assess patient frequently for physical needs  -  Identify cognitive and physical deficits and behaviors that affect risk of falls    -  [place] fall precautions as indicated by assessment   - Educate patient/family on patient safety including physical limitations  - Instruct patient to call for assistance with activity based on assessment  - Modify environment to reduce risk of injury  - Consider OT/PT consult to assist with strengthening/mobility    Outcome: Progressing      Problem: DISCHARGE PLANNING  Goal: Discharge to home or other facility with appropriate resources  INTERVENTIONS:  - Identify barriers to discharge w/patient and caregiver  - Arrange for needed discharge resources and transportation as appropriate  - Identify discharge learning needs (meds, wound care, etc )  - Arrange for interpretive services to assist at discharge as needed  - Refer to Case Management Department for coordinating discharge planning if the patient needs post-hospital services based on physician/advanced practitioner order or complex needs related to functional status, cognitive ability, or social support system   Outcome: Progressing      Problem: Knowledge Deficit  Goal: Patient/family/caregiver demonstrates understanding of disease process, treatment plan, medications, and discharge instructions  Complete learning assessment and assess knowledge base    Interventions:  - Provide teaching at level of understanding  - Provide teaching via preferred learning methods   Outcome: Progressing

## 2017-10-30 NOTE — PROGRESS NOTES
[address] Internal Medicine Progress Note  Patient: Teena Ocasio 81 y o  female   MRN: [id number]  PCP: Ankit Rosado MD  Unit/Bed#: [facility] 920-01 Encounter: [id number]  Date Of Visit: 10/30/17    Assessment/Plan:  · Cervical cord compression with myelopathy - likely related to metastatic disease  No immediate plans for surgical intervention at this time per Neurosurgery  Discussed with Dr Jj Stark (Rad-Onc)  Dose of Decadron increased to q 6 hours and patient will be scheduled for MRI of the brain and lumbar spine with and without contrast   PT eval is pending  Being considered for possible palliative radiation  · Functional quadriparesis - in the setting of above  She does have good muscle strength in lower extremities  PT evaluation pending  · Metastatic Breast cancer ([facility]) - CT of the chest abdomen and pelvis reveals moderate-sized left pleural effusion, multiple hepatic lesions and abnormal appearance of the left adrenal gland suspicious for metastatic disease  Also with bilateral soft tissue masses in the lungs  Patient with an overall poor prognosis  She will follow up with her medical oncologist at the [address] [place] [address] upon discharge  · Moderate left pleural effusion - likely malignant effusion  Patient's respiratory status is stable without immediate need for thoracentesis at this time  · Essential hypertension - resume home medications  · Hypothyroidism - continue levothyroxine  · Constipation - continue bowel regimen and add lactulose (patient was on this prior to admission)    VTE Pharmacologic Prophylaxis:   Pharmacologic: Heparin  Mechanical VTE Prophylaxis in Place: Yes    Patient Centered Rounds: I have performed bedside rounds with nursing staff today  Discussions with Specialists or Other Care Team Provider:  Nursing/radiation oncology    Education and Discussions with Family / Patient:  Patient at the bedside    I updated her on results of CT of the chest/abdomen/pelvis which showed evidence of metastatic disease and she has expressed understanding    Current Length of Stay: 3 day(s)    Current Patient Status: Inpatient   Certification Statement: The patient will continue to require additional inpatient hospital stay due to Cervical cord compression/functional quadriparesis    Discharge Plan:  Not medically cleared for discharge    Code Status: Level 1 - Full Code      Subjective:   No significant overnight events  Patient complains of pain radiating down her right thigh as well as constipation with her last bowel movement being this past Friday  No other new complaints  She denies any chest pain or shortness of breath  Objective:     Vitals:   Temp (24hrs), Av 9 °F (36 6 °C), Min:97 6 °F (36 4 °C), Max:98 3 °F (36 8 °C)    HR:  [68-89] 68  Resp:  [16-18] 16  BP: (108-120)/(64-75) 120/72  SpO2:  [98 %-100 %] 100 %  Body mass index is 26 57 kg/m²  Input and Output Summary (last 24 hours):        Intake/Output Summary (Last 24 hours) at 10/30/17 0993  Last data filed at 10/30/17 5390   Gross per 24 hour   Intake              840 ml   Output              200 ml   Net              640 ml       Physical Exam:  General Appearance:    Alert, cooperative, no distress, appropriately responsive    Head:    Normocephalic, without obvious abnormality, atraumatic, mucous membranes moist    Eyes:   Conjunctiva/corneas clear, EOM's intact   Neck:   Supple   Lungs:     Clear to auscultation bilaterally, respirations unlabored, no crackles or wheeze     Heart:    Regular rate and rhythm, S1 and S2    Abdomen:     Soft, non-tender, nondistended   Extremities:   Extremities normal, atraumatic, no cyanosis or edema   Neurologic:   Chronic bilateral upper extremity tremors unchanged, muscle strength 5/5 left lower extremity 4/5 right lower extremity, alert and oriented x3, no obvious facial droop or asymmetry             Additional Data:     Labs:      Results from last 7 days  Lab Units 10/28/17  0546 10/27/17  2016   WBC Thousand/uL 3 11* 5 24   HEMOGLOBIN g/dL 11 8 11 2*   HEMATOCRIT % 36 7 34 9   PLATELETS Thousands/uL 265 248   NEUTROS PCT %  --  66   LYMPHS PCT %  --  24   MONOS PCT %  --  9   EOS PCT %  --  1       Results from last 7 days  Lab Units 10/29/17  0457   SODIUM mmol/L 139   POTASSIUM mmol/L 3 8   CHLORIDE mmol/L 107   CO2 mmol/L 25   BUN mg/dL 11   CREATININE mg/dL 0 58*   CALCIUM mg/dL 8 7   GLUCOSE RANDOM mg/dL 120           * I Have Reviewed All Lab Data Listed Above  * Additional Pertinent Lab Tests Reviewed: All Labs Within Last 24 Hours Reviewed    Cultures:   Blood Culture: No results found for: BLOODCX  Urine Culture: No results found for: URINECX  Sputum Culture: No components found for: SPUTUMCX  Wound Culture: No results found for: WOUNDCULT    Last 24 Hours Medication List:     acetaminophen 975 mg Oral Q8H [address]   amLODIPine 5 mg Oral Daily   dexamethasone 4 mg Oral Q6H [address]   heparin (porcine) 5,000 Units Subcutaneous Q8H [address]   lactulose 20 g Oral Daily   levothyroxine 100 mcg Oral Early Morning   morphine 15 mg Oral BID   pregabalin 50 mg Oral TID   senna-docusate sodium 1 tablet Oral HS        Today, Patient Was Seen By: Mark Parker MD    ** Please Note: Dragon 360 Dictation voice to text software may have been used in the creation of this document   **

## 2017-10-30 NOTE — PROGRESS NOTES
Patient  Care rounding with Dr Jolly- plan is for patient to get an mri of spine and brain  Also patient will go down to  Radiation for mapping   Pt will work with P/t today for evaulation

## 2017-10-30 NOTE — PHYSICAL THERAPY NOTE
Physical Therapy Evaluation    Patient's Name: Komal Ceron    Admitting Diagnosis  Cord compression ([facility] ) [G95 20]    Problem List  Patient Active Problem List   Diagnosis    Cervical cord compression with myelopathy ([facility] )    Breast cancer ([place] [address] )    Essential hypertension    Hypothyroidism       Past Medical History  Past Medical History:   Diagnosis Date    Breast cancer ([facility] )     Disease of thyroid gland        Past Surgical History  No past surgical history on file  10/30/17 1106   Note Type   Note type Eval/Treat   Pain Assessment   Pain Assessment 0-10   Pain Score 7   Pain Type Acute pain   Pain Location Back; Hip   Pain Orientation Right   Effect of Pain on Daily Activities impaired tolerance to mobility    Home Living   Type of [address] [facility] Two level  (4 JAN; 13 steps inside)   Bathroom Shower/Tub Tub/shower unit   Bathroom Toilet Standard   [facility]  (Transport chair )   Additional Comments Pt reports in the last week, she has been using a transport chair for mobility  Prior to that, pt reports using a RW with constant A/S from family    Prior Function   Level of [place] Needs assistance with ADLs and functional mobility; Needs assistance with IADLs   Lives With Family   Receives Help From Family   ADL Assistance Needs assistance   IADLs Needs assistance   Falls in the last 6 months (denies falls but reports tripping frequently )   Vocational Retired   Comments Pt reports 24 hour S/A at home    Restrictions/Precautions   Weight Bearing Precautions Per Order (no restrictions for OOB per neurosurgery)   Other Precautions Fall Risk;Pain;Bed Alarm; Chair Alarm;Cognitive   General   Family/Caregiver Present No   Cognition   Overall Cognitive Status Impaired   Arousal/Participation Cooperative   Orientation Level Oriented to person;Oriented to place;Oriented to situation   Memory Decreased recall of recent events   Following Commands Follows one step commands with increased time or repetition   Comments Pt is pleasant and cooperative  Able to express basic needs  Reports confilting information at times during interview  RUE Assessment   RUE Assessment WFL   LUE Assessment   LUE Assessment WFL   RLE Assessment   RLE Assessment X  (gross functional deficits R>L)   Strength RLE   R Hip Flexion 2+/5   R Knee Flexion 2+/5   R Knee Extension 4-/5   R Ankle Dorsiflexion 3/5   R Ankle Plantar Flexion 4/5   LLE Assessment   LLE Assessment X  (gross functional deficits R>L)   Strength LLE   L Hip Flexion 2+/5   L Knee Flexion 2+/5   L Knee Extension 4-/5   L Ankle Dorsiflexion 3/5   L Ankle Plantar Flexion 4/5   Coordination   Movements are Fluid and Coordinated 0   Coordination and Movement Description anaxia BLE; R>L, R foot drop with uncoordicated steppage gait    Sensation WFL   Light Touch   RLE Light Touch Impaired   RLE Light Touch Comments reported BLE tingling    LLE Light Touch Impaired   LLE Light Touch Comments reported BLE tingling   Proprioception   RLE Proprioception Impaired   LLE Proprioception Impaired   Bed Mobility   Supine to Sit 4  Minimal assistance   Additional items Assist x 1; Increased time required;LE management   Additional Comments Pt sat EOB with no complaints    Transfers   Sit to Stand 3  Moderate assistance   Additional items Assist x 1; Increased time required;Verbal cues   Stand to Sit 3  Moderate assistance   Additional items Assist x 1; Increased time required;Verbal cues   Ambulation/Elevation   Gait pattern Steppage; Ataxia; Short stride; Step to  (R foot drop)   Gait Assistance 2  Maximal assist   Additional items Assist x 1   Assistive Device Rolling walker   Distance 10'   Balance   Static Sitting Fair   Dynamic Sitting Fair -   Static Standing Fair -   Dynamic Standing Fair -   Ambulatory Poor +   Endurance Deficit   Endurance Deficit Yes   Endurance Deficit Description fatigue, pain    Activity Tolerance Activity Tolerance Patient limited by fatigue;Patient limited by pain   Medical Staff Made Aware Will update CM   Nurse Made Aware appropriate to see per RN    Assessment   Prognosis Guarded   Problem List Decreased strength;Decreased endurance; Impaired balance;Decreased mobility; Decreased coordination;Decreased cognition;Decreased safety awareness;Pain   Assessment Pt is a 81year old female presenting as a transfer from neurosurgery office in [facility]* MRI indicating cervical spinal cord compression  Pt with a problem list which includes breast cancer, essential HTN and hypothyroidism  PT consulted to assess functional mobility and assist with safe d/c planning  No restrictions for OOB per neurosurgery  PTA, pt living at home with her family in a 2 story home with 4 JAN and 13 steps inside  Pt reports recently using a transport chair in the past week  Pt reports prior using a RW with continuous S and assistance  Family assists with all needs  On eval, pt presenting with the following deficits: impaired balance, pain, poor activity tolerance, decreased strength, ROM and coordination  and decreased overall functional mobility  Pt in supine upon arrival and agreeable to particiapte in PT session  Pt required Andreas for bed mobility, modA for sit<->stand and max A for ambulation with a RW  Gait deficits described above  Pt below baseline and this time and would benefit from skilled IP PT intervention at rehab following d/c to maximize function and reduce caregiver burden  PT to continue to follow pt during stay to progress functional mobility (I) and safety  Barriers to Discharge Inaccessible home environment   Barriers to Discharge Comments JAN AND STEPS INSIDE   Goals   Patient Goals TO HAVE LESS PAIN    STG Expiration Date 11/13/17   Short Term Goal #1 Pt will be S with bed mobility  Pt will be S with transfers  Pt will be S with ambulaiton of household distacnes using RW  Pt will be (I) with w/c mobility   Pt will be CGA on stairs (4 JAN home)  Pt will participate in HEP  Treatment Day 0   Plan   Treatment/Interventions Functional transfer training;LE strengthening/ROM; Elevations; Therapeutic exercise; Endurance training;Patient/family training;Equipment eval/education; Bed mobility;Gait training   PT Frequency 5x/wk   Recommendation   Recommendation Short-term skilled PT   Equipment Recommended Wheelchair;Walker   PT - OK to Discharge Yes  (when medically appropriate to rehab )   Additional Comments Ambualted patient to stretcher for transport      Barthel Index   Feeding 10   Bathing 0   Grooming Score 5   Dressing Score 5   Bladder Score 5   Bowels Score 5   Toilet Use Score 5   Transfers (Bed/Chair) Score 10   Mobility (Level Surface) Score 0   Stairs Score 0   Barthel Index Score 45       Lizbeth Caceres, PT

## 2017-10-30 NOTE — PHYSICIAN ADVISOR
Current patient class: Inpatient  The patient is currently on Hospital Day: 3      The patient was admitted to the hospital at [phone] on 10/27/17 for the following diagnosis:  Cord compression ([address] ) [G95 20]       There is documentation in the medical record of an expected length of stay of at least 2 midnights  The patient is therefore expected to satisfy the 2 midnight benchmark and given the 2 midnight presumption is appropriate for INPATIENT ADMISSION  Given this expectation of a satisfying stay, CMS instructs us that the patient is most often appropriate for inpatient admission under part A provided medical necessity is documented in the chart  After review of the relevant documentation, labs, vital signs and test results, the patient is appropriate for INPATIENT ADMISSION  Admission to the hospital as an inpatient is a complex decision making process which requires the practitioner to consider the patients presenting complaint, history and physical examination and all relevant testing  With this in mind, in this case, the patient was deemed appropriate for INPATIENT ADMISSION  After review of the documentation and testing available at the time of the admission I concur with this clinical determination of medical necessity  Rationale is as follows: The patient is a 81 yrs old Female who presented to the ED at 10/27/2017  4:42 PM with a chief complaint of cord compression  The patient continues to remain hospitalized for spinal cord compression, history of breast cancer with metastasis  Neurosurgery is consulted, the patient is on Decadron  Patient also has consultation with Radiation Oncology, Medical Oncology  The patient will remain hospitalized further, and is appropriate for inpatient admission      The patients vitals on arrival were ED Triage Vitals   Temperature Pulse Respirations Blood Pressure SpO2   10/27/17 1700 10/27/17 1700 10/27/17 1700 10/27/17 1700 10/27/17 1700   98 1 °F (36 7 °C) 86 16 128/86 92 %      Temp Source Heart Rate Source Patient Position - Orthostatic VS BP Location FiO2 (%)   10/27/17 1700 10/28/17 0757 10/27/17 1700 10/27/17 1700 --   Oral Monitor Lying Right arm       Pain Score       10/28/17 0343       3           Past Medical History:   Diagnosis Date    Breast cancer ([address] )     Disease of thyroid gland      No past surgical history on file          Consults have been placed to:   IP CONSULT TO HEMATOLOGY  IP CONSULT TO NEUROSURGERY  IP CONSULT TO RADIATION ONCOLOGY    Vitals:    10/29/17 0740 10/29/17 0900 10/29/17 1007 10/29/17 1620   BP: 115/65  113/74 108/64   Pulse: 75   89   Resp: 18   18   Temp: 97 8 °F (36 6 °C)   98 3 °F (36 8 °C)   TempSrc: Oral   Oral   SpO2: 99% 99%  100%       Most recent labs:    Recent Labs      10/28/17   0546  10/29/17   0457   WBC  3 11*   --    HGB  11 8   --    HCT  36 7   --    PLT  265   --    K  3 8  3 8   NA  138  139   CALCIUM  8 8  8 7   BUN  6  11   CREATININE  0 50*  0 58*       Scheduled Meds:  acetaminophen 975 mg Oral Q8H [address]   amLODIPine 5 mg Oral Daily   dexamethasone 4 mg Oral Daily   heparin (porcine) 5,000 Units Subcutaneous Q8H [address]   levothyroxine 100 mcg Oral Early Morning   lidocaine 1 patch Transdermal Daily   morphine 15 mg Oral BID   pregabalin 50 mg Oral TID   senna-docusate sodium 1 tablet Oral HS     Continuous Infusions:   PRN Meds: gadobutrol    HYDROmorphone    naloxone    ondansetron    oxyCODONE    polyethylene glycol    Surgical procedures (if appropriate):

## 2017-10-30 NOTE — SOCIAL WORK
CM met with Pt and daughter to discuss PT's recommendation of rehab  Pt requested a SNF list to review with family, and a follow up by CM tomorrow

## 2017-10-30 NOTE — PLAN OF CARE
Problem: PHYSICAL THERAPY ADULT  Goal: Performs mobility at highest level of function for planned discharge setting  See evaluation for individualized goals  Treatment/Interventions: Functional transfer training, LE strengthening/ROM, Elevations, Therapeutic exercise, Endurance training, Patient/family training, Equipment eval/education, Bed mobility, Gait training  Equipment Recommended: Wheelchair, Birdie Mecca       See flowsheet documentation for full assessment, interventions and recommendations  Prognosis: Guarded  Problem List: Decreased strength, Decreased endurance, Impaired balance, Decreased mobility, Decreased coordination, Decreased cognition, Decreased safety awareness, Pain  Assessment: Pt is a 81year old female presenting as a transfer from neurosurgery office in [facility] 2* MRI indicating cervical spinal cord compression  Pt with a problem list which includes breast cancer, essential HTN and hypothyroidism  PT consulted to assess functional mobility and assist with safe d/c planning  No restrictions for OOB per neurosurgery  PTA, pt living at home with her family in a 2 story home with 4 JAN and 13 steps inside  Pt reports recently using a transport chair in the past week  Pt reports prior using a RW with continuous S and assistance  Family assists with all needs  On eval, pt presenting with the following deficits: impaired balance, pain, poor activity tolerance, decreased strength, ROM and coordination  and decreased overall functional mobility  Pt in supine upon arrival and agreeable to particiapte in PT session  Pt required Andreas for bed mobility, modA for sit<->stand and max A for ambulation with a RW  Gait deficits described above  Pt below baseline and this time and would benefit from skilled IP PT intervention at rehab following d/c to maximize function and reduce caregiver burden  PT to continue to follow pt during stay to progress functional mobility (I) and safety     Barriers to Discharge: Inaccessible home environment  Barriers to Discharge Comments: JAN AND STEPS INSIDE  Recommendation: Short-term skilled PT     PT - OK to Discharge: Yes (when medically appropriate to rehab )    See flowsheet documentation for full assessment

## 2017-10-31 LAB
ANION GAP SERPL CALCULATED.3IONS-SCNC: 7 MMOL/L (ref 4–13)
BASOPHILS # BLD AUTO: 0 THOUSANDS/ΜL (ref 0–0.1)
BASOPHILS NFR BLD AUTO: 0 % (ref 0–1)
BUN SERPL-MCNC: 12 MG/DL (ref 5–25)
CALCIUM SERPL-MCNC: 8.7 MG/DL (ref 8.3–10.1)
CHLORIDE SERPL-SCNC: 106 MMOL/L (ref 100–108)
CO2 SERPL-SCNC: 26 MMOL/L (ref 21–32)
CREAT SERPL-MCNC: 0.49 MG/DL (ref 0.6–1.3)
EOSINOPHIL # BLD AUTO: 0 THOUSAND/ΜL (ref 0–0.61)
EOSINOPHIL NFR BLD AUTO: 0 % (ref 0–6)
ERYTHROCYTE [DISTWIDTH] IN BLOOD BY AUTOMATED COUNT: 13.5 % (ref 11.6–15.1)
GFR SERPL CREATININE-BSD FRML MDRD: 114 ML/MIN/1.73SQ M
GLUCOSE SERPL-MCNC: 101 MG/DL (ref 65–140)
HCT VFR BLD AUTO: 37.1 % (ref 34.8–46.1)
HGB BLD-MCNC: 12.3 G/DL (ref 11.5–15.4)
LYMPHOCYTES # BLD AUTO: 0.7 THOUSANDS/ΜL (ref 0.6–4.47)
LYMPHOCYTES NFR BLD AUTO: 12 % (ref 14–44)
MCH RBC QN AUTO: 28.1 PG (ref 26.8–34.3)
MCHC RBC AUTO-ENTMCNC: 33.2 G/DL (ref 31.4–37.4)
MCV RBC AUTO: 85 FL (ref 82–98)
MONOCYTES # BLD AUTO: 0.06 THOUSAND/ΜL (ref 0.17–1.22)
MONOCYTES NFR BLD AUTO: 1 % (ref 4–12)
NEUTROPHILS # BLD AUTO: 5.2 THOUSANDS/ΜL (ref 1.85–7.62)
NEUTS SEG NFR BLD AUTO: 87 % (ref 43–75)
NRBC BLD AUTO-RTO: 0 /100 WBCS
PLATELET # BLD AUTO: 289 THOUSANDS/UL (ref 149–390)
PMV BLD AUTO: 9.1 FL (ref 8.9–12.7)
POTASSIUM SERPL-SCNC: 4.1 MMOL/L (ref 3.5–5.3)
RBC # BLD AUTO: 4.38 MILLION/UL (ref 3.81–5.12)
SODIUM SERPL-SCNC: 139 MMOL/L (ref 136–145)
WBC # BLD AUTO: 5.98 THOUSAND/UL (ref 4.31–10.16)

## 2017-10-31 PROCEDURE — D0061ZZ BEAM RADIATION OF SPINAL CORD USING PHOTONS 1 - 10 MEV: ICD-10-PCS | Performed by: HOSPITALIST

## 2017-10-31 PROCEDURE — 80048 BASIC METABOLIC PNL TOTAL CA: CPT | Performed by: INTERNAL MEDICINE

## 2017-10-31 PROCEDURE — G8987 SELF CARE CURRENT STATUS: HCPCS

## 2017-10-31 PROCEDURE — 77280 THER RAD SIMULAJ FIELD SMPL: CPT | Performed by: RADIOLOGY

## 2017-10-31 PROCEDURE — 85025 COMPLETE CBC W/AUTO DIFF WBC: CPT | Performed by: INTERNAL MEDICINE

## 2017-10-31 PROCEDURE — 97167 OT EVAL HIGH COMPLEX 60 MIN: CPT

## 2017-10-31 PROCEDURE — 77334 RADIATION TREATMENT AID(S): CPT | Performed by: RADIOLOGY

## 2017-10-31 PROCEDURE — 97116 GAIT TRAINING THERAPY: CPT

## 2017-10-31 PROCEDURE — 77295 3-D RADIOTHERAPY PLAN: CPT | Performed by: RADIOLOGY

## 2017-10-31 PROCEDURE — 97530 THERAPEUTIC ACTIVITIES: CPT

## 2017-10-31 PROCEDURE — 77412 RADIATION TX DELIVERY LVL 3: CPT | Performed by: RADIOLOGY

## 2017-10-31 PROCEDURE — G8988 SELF CARE GOAL STATUS: HCPCS

## 2017-10-31 PROCEDURE — 77300 RADIATION THERAPY DOSE PLAN: CPT | Performed by: RADIOLOGY

## 2017-10-31 RX ADMIN — MORPHINE SULFATE 15 MG: 15 TABLET, EXTENDED RELEASE ORAL at 18:18

## 2017-10-31 RX ADMIN — ACETAMINOPHEN 975 MG: 325 TABLET, FILM COATED ORAL at 21:38

## 2017-10-31 RX ADMIN — PREGABALIN 50 MG: 25 CAPSULE ORAL at 10:21

## 2017-10-31 RX ADMIN — PREGABALIN 50 MG: 25 CAPSULE ORAL at 21:38

## 2017-10-31 RX ADMIN — Medication 1 TABLET: at 21:38

## 2017-10-31 RX ADMIN — DEXAMETHASONE 4 MG: 4 TABLET ORAL at 05:15

## 2017-10-31 RX ADMIN — AMLODIPINE BESYLATE 5 MG: 5 TABLET ORAL at 10:24

## 2017-10-31 RX ADMIN — HEPARIN SODIUM 5000 UNITS: 5000 INJECTION, SOLUTION INTRAVENOUS; SUBCUTANEOUS at 21:38

## 2017-10-31 RX ADMIN — LEVOTHYROXINE SODIUM 100 MCG: 100 TABLET ORAL at 05:18

## 2017-10-31 RX ADMIN — ACETAMINOPHEN 975 MG: 325 TABLET, FILM COATED ORAL at 14:36

## 2017-10-31 RX ADMIN — DEXAMETHASONE 4 MG: 4 TABLET ORAL at 12:18

## 2017-10-31 RX ADMIN — LACTULOSE 20 G: 20 SOLUTION ORAL at 10:22

## 2017-10-31 RX ADMIN — MORPHINE SULFATE 15 MG: 15 TABLET, EXTENDED RELEASE ORAL at 10:22

## 2017-10-31 RX ADMIN — DEXAMETHASONE 4 MG: 4 TABLET ORAL at 18:18

## 2017-10-31 RX ADMIN — PREGABALIN 50 MG: 25 CAPSULE ORAL at 18:18

## 2017-10-31 RX ADMIN — OXYCODONE HYDROCHLORIDE 10 MG: 10 TABLET ORAL at 10:22

## 2017-10-31 RX ADMIN — ACETAMINOPHEN 975 MG: 325 TABLET, FILM COATED ORAL at 05:15

## 2017-10-31 RX ADMIN — HEPARIN SODIUM 5000 UNITS: 5000 INJECTION, SOLUTION INTRAVENOUS; SUBCUTANEOUS at 05:18

## 2017-10-31 RX ADMIN — HEPARIN SODIUM 5000 UNITS: 5000 INJECTION, SOLUTION INTRAVENOUS; SUBCUTANEOUS at 14:36

## 2017-10-31 NOTE — SOCIAL WORK
CM met with Pt to follow up on her and family SNF choices and was informed the family continues to research choices, and was requested to follow up later  CM to follow up

## 2017-10-31 NOTE — PLAN OF CARE
Problem: OCCUPATIONAL THERAPY ADULT  Goal: Performs self-care activities at highest level of function for planned discharge setting  See evaluation for individualized goals  Treatment Interventions: ADL retraining, Functional transfer training, Endurance training, Cognitive reorientation, Patient/family training, Equipment evaluation/education, Compensatory technique education, Energy conservation, Activityengagement          See flowsheet documentation for full assessment, interventions and recommendations  Limitation: Decreased ADL status, Decreased Safe judgement during ADL, Decreased endurance, Decreased self-care trans, Decreased high-level ADLs, Decreased cognition  Prognosis: Fair, Good  Assessment: 71 YO FEMALE SEEN FOR INITIAL OT FOLLOWING ADMISSION FROM [facility] OFFICE WITH DX OF CERVICAL SPINAL CORD COMPRESSION  PT WITH PROBLEMS LIST INCLUDING R FOOT DROP, STAGE 2 PRESSURE INJURY TO BUTTOCKS/SACRUM, BREAST CA, HTN, AND HYPOTHYROIDISM  PT IS FROM HOME WITH FAMILY WHERE SHE HAS 24 HOUR SUPERVISION + ASSIST FOR ADLS/IADLS  PT REPORTS RECENTLY USING TRANSPORT CHAIR 2' GENERALIZED WEAKNESS  PT CURRENTLY REQUIRES OVERALL MAX A FOR ADLS, MOD A FOR TRANSFERS AND MAX A FOR LIMITED FUNCTIONAL MOBILITY WITH USE OF RW  PT IS LIMITED 2' OCCASIONAL PAIN, FATIGUE, IMPAIRED BALANCE, B/L KNEE BUCKLING WITH FATIGUE, R-FOOT DROP, LIMITED SAFETY AWARENESS/INSIGHT/JUDGEMENT, MILDLY IMPULSIVE AT TIMES AND LIMITED ACTIVITY TOLERANCE  FROM AN OT PERSPECTIVE, PT WOULD BENEFIT FROM CONT OT SERVICES IN AN INPT REHAB SETTING UPON D/C  WILL CONT TO FOLLOW TO ADDRESS THE BELOW DESCRIBED GOALS  OT Discharge Recommendation: Short Term Rehab  OT - OK to Discharge:  Yes

## 2017-10-31 NOTE — OCCUPATIONAL THERAPY NOTE
[address]  Evaluation     Patient Name: Milton Scott  SFWQJ'A Date: 10/31/2017  Problem List  Patient Active Problem List   Diagnosis    [facility]    Breast cancer ([facility] )    Essential hypertension    Hypothyroidism     Past Medical History  Past Medical History:   Diagnosis Date    Breast cancer ([facility] )     Disease of thyroid gland      Past Surgical History  No past surgical history on file  10/31/17 9994   Note Type   Note type Eval/Treat   Restrictions/Precautions   Weight Bearing Precautions Per Order No   Other Precautions Fall Risk;Pain; Chair Alarm; Bed Alarm;Cognitive; Impulsive   Pain Assessment   Pain Assessment No/denies pain   Pain Score No Pain   Hospital Pain Intervention(s) Ambulation/increased activity; Distraction; Emotional support   Response to Interventions TOLERATED    Home Living   Type of [address] [place] [address] Two level;Stairs to enter with rails  (4 JAN )   Bathroom Shower/Tub Tub/shower unit   Bathroom Toilet Standard   Bathroom Accessibility Accessible   Home Equipment Walker; Wheelchair-manual  [employer] )   Prior Function   Level of [place] Needs assistance with IADLs; Needs assistance with ADLs and functional mobility   Lives With Family; Son   Receives Help From Family   ADL Assistance Needs assistance   IADLs Needs assistance   Falls in the last 6 months 0   Vocational Retired   Lifestyle   [employer] [facility] [address] [employer] [facility] ALL ADLS/IADLS PTA  PT REPORTS FOR THE PAST WEEK OR SO, PT DEGRESSING, REQUIRING ADDITIONAL ASSISTANCE STATING "MY SON HAD TO CARRY ME"   Reciprocal Relationships LIVES WITH SON + FAMILY   PT REPORTS 24 HOUR SUPERVISION    Service to Others RETIRED    Intrinsic Gratification ENJOYS SPENDING TIME WITH FAMILY    Psychosocial   Psychosocial (WDL) WDL   ADL   Eating Assistance 5  Supervision/Setup   Grooming Assistance 5  Supervision/Setup   UB Bathing Assistance 3  Moderate Assistance LB Bathing Assistance 2  Maximal Assistance   UB Dressing Assistance 3  Moderate Assistance   LB Dressing Assistance 2  Maximal Assistance   Toileting Assistance  2  Maximal Assistance   Functional Assistance 2  Maximal Assistance   Bed Mobility   Supine to Sit 4  Minimal assistance   Additional items Assist x 1; Increased time required;Verbal cues   Sit to Supine Unable to assess  (PT LEFT SITTING OOB WITH CHAIR ALARM ON )   Transfers   Sit to Stand 3  Moderate assistance   Additional items Assist x 1; Increased time required;Verbal cues   Stand to Sit 3  Moderate assistance   Additional items Assist x 1; Increased time required;Verbal cues   Stand pivot 3  Moderate assistance   Toilet transfer 3  Moderate assistance   Additional items Assist x 1; Increased time required;Verbal cues;Standard toilet  (+ GRAB BAR )   Additional Comments + SBA X1 FOR SAFETY, ADDITIONAL ASSIST REQUIRED WITH FATIGUE   Functional Mobility   Functional Mobility 2  Maximal assistance   Additional Comments TO/FROM BATHROOM  CUES FOR SAFE USE OF RW/SAFE TRANSFER TECHNIQUE  NOTED R FOOT DROP + B/L KNEE BUCKLING WITH FATIGUE  Additional items Rolling walker   Balance   Static Sitting Fair   Static Standing Poor   Ambulatory Poor -   Activity Tolerance   Activity Tolerance Patient limited by fatigue;Treatment limited secondary to medical complications (Comment)   Medical Staff Made Aware CM AWARE OF D/C PLAN    Nurse Made Aware APPROPRIATE TO SEE    RUE Assessment   RUE Assessment WFL   RUE Strength   RUE Overall Strength Within Functional Limits - able to perform ADL tasks with strength   LUE Assessment   LUE Assessment WFL   LUE Strength   LUE Overall Strength Within Functional Limits - able to perform ADL tasks with strength   Hand Function   Gross Motor Coordination Functional   Fine Motor Coordination Functional   Cognition   Overall Cognitive Status Impaired   Arousal/Participation Cooperative; Alert   Attention Attends with cues to redirect   Orientation Level Oriented X4   Memory Decreased recall of precautions;Decreased short term memory   Following Commands Follows one step commands without difficulty   Comments PT IS PLEASANT AND COOPERATIVE  MILDLY IMPUSLIVE AT TIMES WITH LIMITED SAFETY AWARENESS/INSIGHT/JUDGEMENT  PT ANXIOUS AT TIME WITH FEAR OF FALLING  Assessment   Limitation Decreased ADL status; Decreased Safe judgement during ADL;Decreased endurance;Decreased self-care trans;Decreased high-level ADLs; Decreased cognition   Prognosis Fair;Good   Assessment 73 YO FEMALE SEEN FOR INITIAL OT FOLLOWING ADMISSION FROM [facility] OFFICE WITH DX OF CERVICAL SPINAL CORD COMPRESSION  PT WITH PROBLEMS LIST INCLUDING R FOOT DROP, STAGE 2 PRESSURE INJURY TO BUTTOCKS/SACRUM, BREAST CA, HTN, AND HYPOTHYROIDISM  PT IS FROM HOME WITH FAMILY WHERE SHE HAS 24 HOUR SUPERVISION + ASSIST FOR ADLS/IADLS  PT REPORTS RECENTLY USING TRANSPORT CHAIR 2' GENERALIZED WEAKNESS  PT CURRENTLY REQUIRES OVERALL MAX A FOR ADLS, MOD A FOR TRANSFERS AND MAX A FOR LIMITED FUNCTIONAL MOBILITY WITH USE OF RW  PT IS LIMITED 2' OCCASIONAL PAIN, FATIGUE, IMPAIRED BALANCE, B/L KNEE BUCKLING WITH FATIGUE, R-FOOT DROP, LIMITED SAFETY AWARENESS/INSIGHT/JUDGEMENT, MILDLY IMPULSIVE AT TIMES AND LIMITED ACTIVITY TOLERANCE  FROM AN OT PERSPECTIVE, PT WOULD BENEFIT FROM CONT OT SERVICES IN AN INPT REHAB SETTING UPON D/C  WILL CONT TO FOLLOW TO ADDRESS THE BELOW DESCRIBED GOALS  Goals   Patient Goals TO USE THE BATHROOM    LT Time Frame 10-14   Long Term Goal #1 SEE BELOW    Plan   Treatment Interventions ADL retraining;Functional transfer training; Endurance training;Cognitive reorientation;Patient/family training;Equipment evaluation/education; Compensatory technique education; Energy conservation; Activityengagement   Goal Expiration Date 11/14/17   OT Frequency 3-5x/wk   Recommendation   OT Discharge Recommendation Short Term Rehab   OT - OK to Discharge Yes   Barthel Index   Feeding 10   Bathing 0   Grooming Score 5   Dressing Score 5   Bladder Score 5   Bowels Score 5   Toilet Use Score 5   Transfers (Bed/Chair) Score 10   Mobility (Level Surface) Score 0   Stairs Score 0   Barthel Index Score 45   Modified [place] Scale   Modified [place] Scale 4       OCCUPATIONAL THERAPY GOALS TO BE MET WITHIN 10-14 DAYS:    -Pt will complete additional cognitive assessment with 100% attention to task in order to assist with safe d/c plan  -Pt will follow 100% simple 2-step commands and be A&O x4 consistently with environmental cues to increase participation in functional activities  -Pt will increase bed mobility to S LEVEL to participate in functional activities with G tolerance and balance  -Pt will improve functional mobility and transfers to S LEVEL on/off all surfaces w/ G balance/safety including toileting   -Pt will participate in lt grooming task/feeding task with S LEVEL after set-up while seated EOB with G safety and balance  -Pt will increase independence in all ADLS to MIN A with G balance sitting upright in chair   -Pt will improve activity tolerance to G for 20 min txment sessions w/ G carry over of learned energy conservation techniques   -Pt will demonstrate G carryover of learned safety techniques and proper body mechanics in functional and leisure activities with use of DME        Documentation completed by PHI Mensah, OTR/MEGAN

## 2017-10-31 NOTE — PLAN OF CARE
Problem: PHYSICAL THERAPY ADULT  Goal: Performs mobility at highest level of function for planned discharge setting  See evaluation for individualized goals  Treatment/Interventions: Functional transfer training, LE strengthening/ROM, Elevations, Therapeutic exercise, Endurance training, Patient/family training, Equipment eval/education, Bed mobility, Gait training  Equipment Recommended: Wheelchair, [address]       See flowsheet documentation for full assessment, interventions and recommendations  Outcome: Progressing  Prognosis: Guarded  Problem List: Decreased strength, Decreased range of motion, Decreased endurance, Impaired balance, Decreased mobility, Decreased coordination, Decreased cognition, Decreased safety awareness  Assessment: Pt engaged in PT treatment session focussing on improving pt's overall functional mobility  Pt able to increase ambulation distance to 10x2 with moderate Ax1 but is recommended to have additinal person for safety and chair follow  Following 2nd set, pt with onset of LE buckling in which she required chair  Unsfe to return home at this time  Pt may benefit from [facility] RLE  WIll conitnue to follow pt during stay to progress functional mobility (I) and safety  Rehab remains appropriate  Barriers to Discharge: Inaccessible home environment  Barriers to Discharge Comments: JAN AND STEPS INSIDE  Recommendation: Short-term skilled PT     PT - OK to Discharge: Yes (to rehab when medically appropriate )    See flowsheet documentation for full assessment

## 2017-10-31 NOTE — PHYSICAL THERAPY NOTE
PT TREATMENT        10/31/17 1500   Pain Assessment   Pain Assessment No/denies pain   Pain Score No Pain   Restrictions/Precautions   Weight Bearing Precautions Per Order No   Other Precautions Cognitive; Chair Alarm; Bed Alarm; Fall Risk;Pain   General   Chart Reviewed Yes   Additional Pertinent History Pt noted to have R foot drag  Pt reports owning a brace for prior ankle sprain but denies any brace related to foot drop  Response to Previous Treatment Patient with no complaints from previous session  Family/Caregiver Present No   Cognition   Overall Cognitive Status Impaired   Arousal/Participation Cooperative   Attention Attends with cues to redirect   Orientation Level Oriented X4   Memory Decreased recall of precautions;Decreased short term memory   Following Commands Follows one step commands without difficulty   Comments pt with deficits realted to memory; appears inconsistent during conversation at times    Subjective   Subjective Pt agreeable to particiapte in PT session    Bed Mobility   Supine to Sit 4  Minimal assistance   Additional items Assist x 1; Increased time required;Verbal cues   Additional Comments Pt with no compaints sitting EOB  Pt found to be incontinent of bowel in which she required asssitance to clean    Transfers   Sit to Stand 3  Moderate assistance   Additional items Assist x 1   Stand to Sit 3  Moderate assistance   Additional items Assist x 1   Stand pivot 3  Moderate assistance   Additional items Assist x 1   Toilet transfer 3  Moderate assistance   Additional items Assist x 1;Standard toilet  (grab bars )   Additional Comments recommending chair follow for long distances    Ambulation/Elevation   Gait pattern R Foot drag;Steppage;R Knee Jeanine;L Knee Jeanine; Poor UE support; Improper Weight shift; Short stride; Step to;Ataxia   Gait Assistance 3  Moderate assist   Additional items Assist x 1  (second person recommended for safety)   Assistive Device Rolling walker   Distance 10'x2   Balance   Static Sitting Fair   Static Standing Poor   Dynamic Standing Poor   Ambulatory Poor -   Endurance Deficit   Endurance Deficit Yes   Endurance Deficit Description fatigue   Activity Tolerance   Activity Tolerance Patient limited by fatigue   Medical Staff Made Aware yes   Nurse [address], RN    Assessment   Prognosis Guarded   Problem List Decreased strength;Decreased range of motion;Decreased endurance; Impaired balance;Decreased mobility; Decreased coordination;Decreased cognition;Decreased safety awareness   Assessment Pt engaged in PT treatment session focussing on improving pt's overall functional mobility  Pt able to increase ambulation distance to 10x2 with moderate Ax1 but is recommended to have additinal person for safety and chair follow  Following 2nd set, pt with onset of LE buckling in which she required chair  Unsfe to return home at this time  Pt may benefit from [facility] RLE  WIll conitnue to follow pt during stay to progress functional mobility (I) and safety  Rehab remains appropriate  Barriers to Discharge Inaccessible home environment   Goals   Patient Goals to use bathroom    STG Expiration Date 11/13/17   Treatment Day 1   Plan   Treatment/Interventions Functional transfer training;LE strengthening/ROM; Therapeutic exercise; Endurance training;Cognitive reorientation;Patient/family training;Equipment eval/education; Bed mobility;Gait training; Compensatory technique education   Progress Progressing toward goals   PT Frequency 5x/wk   Recommendation   Recommendation Short-term skilled PT   Equipment Recommended Walker; Wheelchair   PT - OK to Discharge Yes  (to rehab when medically appropriate )   Additional Comments OOB in chair with alarm and SCDs activated  All needs within reach  Soft care cushion under bottocks        Amelia Medrano, PT

## 2017-10-31 NOTE — PROGRESS NOTES
[facility] Internal Medicine Progress Note  Patient: Dagoberto Garcia 81 y o  female   MRN: [id number]  PCP: Hardy Salazar MD  Unit/Bed#: [facility] 920-01 Encounter: [id number]  Date Of Visit: 10/31/17    Assessment:    Principal Problem:    Cervical cord compression with myelopathy ([facility] )  Active Problems:    Breast cancer ([facility] )    Essential hypertension    Hypothyroidism      Plan:    · Cervical cord compression with myelopathy:  · likely related to metastatic disease  · No immediate plans for surgical intervention at this time per Neurosurgery  · evaluated by Rad Onc to be started on XRT for cervical spine in a day or so  · Mass at [facility]:  · XRT from today for 10 sessions  · Cont decadron 4q6  · Functional quadriparesis - in the setting of above  She does have good muscle strength in lower extremities  · Metastatic Breast cancer ([facility] ) - CT of the chest abdomen and pelvis reveals moderate-sized left pleural effusion, multiple hepatic lesions and abnormal appearance of the left adrenal gland suspicious for metastatic disease  Also with bilateral soft tissue masses in the lungs  Patient with an overall poor prognosis  She will follow up with her medical oncologist at the [address] [place] [address] upon discharge  · Moderate left pleural effusion - likely malignant effusion  Patient's respiratory status is stable without immediate need for thoracentesis at this time  · Essential hypertension - cont amlodipine  · Hypothyroidism - continue levothyroxine  · Constipation - continue bowel regimen and add lactulose (patient was on this prior to admission)      VTE Pharmacologic Prophylaxis:   Pharmacologic: Heparin  Mechanical VTE Prophylaxis in Place: Yes    Patient Centered Rounds: I have performed bedside rounds with nursing staff today      Discussions with Specialists or Other Care Team Provider: rad onc    Education and Discussions with Family / Patient: patient    Time Spent for Care: 39 minutes  More than 50% of total time spent on counseling and coordination of care as described above  Current Length of Stay: 4 day(s)    Current Patient Status: Inpatient   Certification Statement: The patient will continue to require additional inpatient hospital stay due to work on DC plan to facility    Discharge Plan / Estimated Discharge Date: once able to find facility that can bring her in for XRT    Code Status: Level 1 - Full Code      Subjective:   Has neck pain, no new complains    Objective:     Vitals:   Temp (24hrs), Av 3 °F (36 8 °C), Min:97 9 °F (36 6 °C), Max:98 9 °F (37 2 °C)    HR:  [76-85] 85  Resp:  [17-18] 17  BP: (109-122)/(68-79) 109/68  SpO2:  [98 %-99 %] 99 %  Body mass index is 26 57 kg/m²  Input and Output Summary (last 24 hours): Intake/Output Summary (Last 24 hours) at 10/31/17 1750  Last data filed at 10/31/17 1305   Gross per 24 hour   Intake             1040 ml   Output              700 ml   Net              340 ml       Physical Exam:     Physical Exam   Constitutional: She is oriented to person, place, and time  She appears well-developed and well-nourished  HENT:   Head: Normocephalic and atraumatic  Eyes: Conjunctivae are normal  No scleral icterus  Cardiovascular: Normal rate, regular rhythm and normal heart sounds  Exam reveals no gallop and no friction rub  No murmur heard  Pulmonary/Chest: Effort normal and breath sounds normal  No respiratory distress  She has no wheezes  Abdominal: Soft  She exhibits no distension  There is no tenderness  Musculoskeletal: She exhibits no edema  Neurological: She is alert and oriented to person, place, and time           Additional Data:     Labs:      Results from last 7 days  Lab Units 10/31/17  0508   WBC Thousand/uL 5 98   HEMOGLOBIN g/dL 12 3   HEMATOCRIT % 37 1   PLATELETS Thousands/uL 289   NEUTROS PCT % 87*   LYMPHS PCT % 12*   MONOS PCT % 1*   EOS PCT % 0       Results from last 7 days  Lab Units 10/31/17  0508   SODIUM mmol/L 139   POTASSIUM mmol/L 4 1   CHLORIDE mmol/L 106   CO2 mmol/L 26   BUN mg/dL 12   CREATININE mg/dL 0 49*   CALCIUM mg/dL 8 7   GLUCOSE RANDOM mg/dL 101           * I Have Reviewed All Lab Data Listed Above  * Additional Pertinent Lab Tests Reviewed: All Labs Within Last 24 Hours Reviewed    Imaging:    Imaging Reports Reviewed Today Include:   Imaging Personally Reviewed by Myself Includes:      Recent Cultures (last 7 days):           Last 24 Hours Medication List:     acetaminophen 975 mg Oral Q8H [address]   amLODIPine 5 mg Oral Daily   dexamethasone 4 mg Oral Q6H [address]   heparin (porcine) 5,000 Units Subcutaneous Q8H [address]   lactulose 20 g Oral Daily   levothyroxine 100 mcg Oral Early Morning   morphine 15 mg Oral BID   pregabalin 50 mg Oral TID   senna-docusate sodium 1 tablet Oral HS        Today, Patient Was Seen By: Vaenssa Carrington MD    ** Please Note: This note has been constructed using a voice recognition system   **

## 2017-10-31 NOTE — WOUND OSTOMY CARE
Progress Note - Wound   Salud Womack 81 y o  female MRN: [id number]  [facility] Encounter: [id number]      Assessment:   Patient is a 81year old female brought to [facility] as direct admitted for neurosurgery from [facility] due to cervical spine compression on MRI  Patient admitted with present on admission pressure injury to buttocks and sacrum, per patient duration is approximately a week and she has been treating with "diaper rash" ointment  Noted mid sacrum with stage 2 pressure injury measuring as documented below with pink base and intact periwound  L buttock also with small stage 2 pressure injury measuring as documented below with 100% pink base and intact periwound  R buttock with healed wound as evidence by hypopigmented scar tissue  Patient is incontinent per self account with some mobility deficit in bed  Heels are dry but intact, no others wound seen  Plan:   1-Calazime paste to sacrum, buttocks and heels TID and PRN  2-Hydraguard to bilateral heels BID and PRN  3-Elevate heels to offload pressure  4-Moisturize skin daily with skin nourishing cream  5-Soft care cushion when out of bed  6-Turn/reposition q2h for pressure re-distribution on skin  Vitals: Blood pressure 122/79, pulse 80, temperature 97 9 °F (36 6 °C), temperature source Oral, resp  rate 18, height 5' 2" (1 575 m), weight 65 9 kg (145 lb 4 5 oz), SpO2 99 %  ,Body mass index is 26 57 kg/m²  Pressure Ulcer 10/27/17 Buttocks Left (Active)   Staging Stage II 10/31/2017 10:21 AM   Wound Description Pink 10/31/2017 10:21 AM   Mehreen-wound Assessment Pink 10/31/2017 10:21 AM   Wound Length (cm) 0 6 cm 10/31/2017 10:21 AM   Wound Width (cm) 0 5 cm 10/31/2017 10:21 AM   Wound Depth (cm) 0 1 10/31/2017 10:21 AM   Calculated Wound Area (cm^2) 0 3 cm^2 10/31/2017 10:21 AM   Calculated Wound Volume (cm^3) 0 03 cm^3 10/31/2017 10:21 AM   Drainage Amount None 10/30/2017  3:30 PM   Treatment Offload; Turn & reposition 10/31/2017  8:00 AM   Dressing Open to air 10/30/2017  3:30 PM   Patient Tolerance Tolerated well 10/31/2017 10:21 AM       Pressure Ulcer 10/27/17 Buttocks Mid (Active)   Staging Stage II 10/31/2017 10:21 AM   Wound Description Pink 10/31/2017 10:21 AM   Mehreen-wound Assessment Pink 10/31/2017 10:21 AM   Wound Length (cm) 0 5 cm 10/31/2017 10:21 AM   Wound Width (cm) 0 6 cm 10/31/2017 10:21 AM   Wound Depth (cm) 0 1 10/31/2017 10:21 AM   Calculated Wound Area (cm^2) 0 3 cm^2 10/31/2017 10:21 AM   Calculated Wound Volume (cm^3) 0 03 cm^3 10/31/2017 10:21 AM   Drainage Amount None 10/30/2017  3:30 PM   Treatment Offload; Turn & reposition 10/31/2017  8:00 AM   Dressing Open to air 10/30/2017  3:30 PM   Patient Tolerance Tolerated well 10/31/2017 10:21 AM         Our recommendations are placed as orders, please call wound care to ext [phone] with questions of concerns      Rolly Machado, RN, BSN, [facility]

## 2017-11-01 PROCEDURE — 77412 RADIATION TX DELIVERY LVL 3: CPT | Performed by: RADIOLOGY

## 2017-11-01 PROCEDURE — 77334 RADIATION TREATMENT AID(S): CPT | Performed by: RADIOLOGY

## 2017-11-01 PROCEDURE — 77331 SPECIAL RADIATION DOSIMETRY: CPT | Performed by: RADIOLOGY

## 2017-11-01 PROCEDURE — 97535 SELF CARE MNGMENT TRAINING: CPT

## 2017-11-01 PROCEDURE — 77387 GUIDANCE FOR RADJ TX DLVR: CPT | Performed by: RADIOLOGY

## 2017-11-01 PROCEDURE — 97112 NEUROMUSCULAR REEDUCATION: CPT

## 2017-11-01 PROCEDURE — 77300 RADIATION THERAPY DOSE PLAN: CPT | Performed by: RADIOLOGY

## 2017-11-01 PROCEDURE — 77417 THER RADIOLOGY PORT IMAGE(S): CPT | Performed by: RADIOLOGY

## 2017-11-01 PROCEDURE — 97110 THERAPEUTIC EXERCISES: CPT

## 2017-11-01 PROCEDURE — D0061ZZ BEAM RADIATION OF SPINAL CORD USING PHOTONS 1 - 10 MEV: ICD-10-PCS | Performed by: HOSPITALIST

## 2017-11-01 RX ADMIN — LEVOTHYROXINE SODIUM 100 MCG: 100 TABLET ORAL at 05:04

## 2017-11-01 RX ADMIN — PREGABALIN 50 MG: 25 CAPSULE ORAL at 17:39

## 2017-11-01 RX ADMIN — DEXAMETHASONE 4 MG: 4 TABLET ORAL at 06:45

## 2017-11-01 RX ADMIN — DEXAMETHASONE 4 MG: 4 TABLET ORAL at 17:39

## 2017-11-01 RX ADMIN — LACTULOSE 20 G: 20 SOLUTION ORAL at 09:11

## 2017-11-01 RX ADMIN — ACETAMINOPHEN 975 MG: 325 TABLET, FILM COATED ORAL at 06:45

## 2017-11-01 RX ADMIN — PREGABALIN 50 MG: 25 CAPSULE ORAL at 09:03

## 2017-11-01 RX ADMIN — Medication 1 TABLET: at 21:15

## 2017-11-01 RX ADMIN — ACETAMINOPHEN 975 MG: 325 TABLET, FILM COATED ORAL at 21:14

## 2017-11-01 RX ADMIN — HEPARIN SODIUM 5000 UNITS: 5000 INJECTION, SOLUTION INTRAVENOUS; SUBCUTANEOUS at 21:15

## 2017-11-01 RX ADMIN — AMLODIPINE BESYLATE 5 MG: 5 TABLET ORAL at 09:11

## 2017-11-01 RX ADMIN — MORPHINE SULFATE 15 MG: 15 TABLET, EXTENDED RELEASE ORAL at 09:11

## 2017-11-01 RX ADMIN — HEPARIN SODIUM 5000 UNITS: 5000 INJECTION, SOLUTION INTRAVENOUS; SUBCUTANEOUS at 13:44

## 2017-11-01 RX ADMIN — ACETAMINOPHEN 975 MG: 325 TABLET, FILM COATED ORAL at 13:44

## 2017-11-01 RX ADMIN — ANORECTAL OINTMENT: 15.7; .44; 24; 20.6 OINTMENT TOPICAL at 22:51

## 2017-11-01 RX ADMIN — PREGABALIN 50 MG: 25 CAPSULE ORAL at 21:15

## 2017-11-01 RX ADMIN — OXYCODONE HYDROCHLORIDE 10 MG: 10 TABLET ORAL at 05:04

## 2017-11-01 RX ADMIN — DEXAMETHASONE 4 MG: 4 TABLET ORAL at 12:56

## 2017-11-01 RX ADMIN — MORPHINE SULFATE 15 MG: 15 TABLET, EXTENDED RELEASE ORAL at 17:44

## 2017-11-01 RX ADMIN — DEXAMETHASONE 4 MG: 4 TABLET ORAL at 00:22

## 2017-11-01 RX ADMIN — DEXAMETHASONE 4 MG: 4 TABLET ORAL at 23:32

## 2017-11-01 RX ADMIN — HEPARIN SODIUM 5000 UNITS: 5000 INJECTION, SOLUTION INTRAVENOUS; SUBCUTANEOUS at 05:04

## 2017-11-01 RX ADMIN — OXYCODONE HYDROCHLORIDE 10 MG: 10 TABLET ORAL at 22:50

## 2017-11-01 RX ADMIN — OXYCODONE HYDROCHLORIDE 10 MG: 10 TABLET ORAL at 13:47

## 2017-11-01 NOTE — SOCIAL WORK
Cm f/w w rehab choices w pt & son at bedside  Discussed need for transportation from snf to radiation tx's & need to pay out of pocket  Pt upset about expenses  They requested [facility] as a choice & will wait for dtr to come for other choices  Ref sent  CM called  \[facility]\"" radiation to inquire about plan  States pt will start 1st tx to another area today for a total of 10 tx's M-f only at \[facility]\""

## 2017-11-01 NOTE — PLAN OF CARE
Problem: PHYSICAL THERAPY ADULT  Goal: Performs mobility at highest level of function for planned discharge setting  See evaluation for individualized goals  Treatment/Interventions: Functional transfer training, LE strengthening/ROM, Elevations, Therapeutic exercise, Endurance training, Patient/family training, Equipment eval/education, Bed mobility, Gait training  Equipment Recommended: Wheelchair, Evelyn Solano       See flowsheet documentation for full assessment, interventions and recommendations  Outcome: Progressing  Prognosis: Guarded  Problem List: Decreased strength, Decreased endurance, Impaired balance, Decreased mobility, Decreased cognition, Impaired judgement, Decreased safety awareness, Pain  Assessment: PRESENTLY SESSION LIMITED TO STRENGHTENING AND TRANSFER TRAINING GIVEN PENDING RADIATION TREATMENT  ONGOING COMPLAINTS OF BACK PAIN PERSIST WITH ATTEMPTS AT BED MOBILITY AND TRANSFER TRAINING TO COMMODE AND STRETCHER  RLE CONTINUES TO DEMONSTRATE ONGOING FOOT R Arvin Lawrence 1 WITH EACH TRANSFER ATTEMPT  GIVEN THE PT'S LIMITED ASSISTANCE IN HOME AS WELL AS STAIR OBSTACLE, WOULD CONTINUE TO RECCOMEND ONGOING REHAB UPON D/C  Barriers to Discharge: Inaccessible home environment  Barriers to Discharge Comments: JAN AND STEPS INSIDE  Recommendation:  (REHAB)     PT - OK to Discharge: (S) Yes ([address] [place] [address])    See flowsheet documentation for full assessment     Darryle Griffin, PTA

## 2017-11-01 NOTE — PHYSICAL THERAPY NOTE
Physical Therapy Progress Note   11/01/17 1400   Pain Assessment   Pain Assessment 0-10   Pain Score 5   Pain Type Acute pain   Pain Location Back   Pain Orientation Bilateral   Pain Descriptors Aching;Dull   Pain Frequency Constant/continuous   Pain Onset Ongoing   Clinical Progression Gradually improving   Effect of Pain on Daily Activities LIMITED MOBILITY  Patient's Stated Pain Goal No pain   Hospital Pain Intervention(s) Repositioned; Ambulation/increased activity   Response to Interventions PT  AGREEABLE TO ATTEMPT TRANSFER TO COMMODE BEFORE REDIATION  Multiple Pain Sites No   Pain Rating: FLACC (Rest) - Face 1   Pain Rating: FLACC (Rest) - Legs 0   Pain Rating: FLACC (Rest) - Activity 0   Pain Rating: FLACC (Rest) - Cry 0   Pain Rating: FLACC (Rest) - Consolability 0   Score: FLACC (Rest) 1   Restrictions/Precautions   Weight Bearing Precautions Per Order No   Other Precautions Cognitive; Chair Alarm; Bed Alarm; Fall Risk;Pain   General   Chart Reviewed Yes   Response to Previous Treatment Patient with no complaints from previous session  Family/Caregiver Present Yes   Cognition   Overall Cognitive Status Impaired   Arousal/Participation Alert; Cooperative   Attention Attends with cues to redirect   Orientation Level Oriented X4   Memory Decreased recall of precautions   Following Commands Follows one step commands without difficulty   Subjective   Subjective THE PT  REPORT ONLY TRANSFERING FROM BED TO CHAIR/COMMODE  PT  WANTING TO TRANSFER TO COMMODE BEFORE RADIATION  Bed Mobility   Supine to Sit 4  Minimal assistance   Additional items Assist x 1;HOB elevated; Increased time required;Verbal cues   Sit to Supine 4  Minimal assistance   Additional items Assist x 2; Increased time required;LE management  (LOG ROLL ONTO STRETCHER )   Transfers   Sit to Stand 3  Moderate assistance   Additional items Assist x 1; Armrests; Increased time required;Verbal cues   Stand to Sit 3  Moderate assistance   Additional items Assist x 1; Armrests; Increased time required;Verbal cues   Stand pivot 3  Moderate assistance   Additional items Assist x 1; Armrests; Increased time required;Verbal cues  (TO COMMODE AND THEN STRETCHER FOR RADIATION )   Toilet transfer 3  Moderate assistance   Additional items Assist x 1; Armrests; Increased time required;Commode;Verbal cues   Balance   Static Sitting Fair   Static Standing Poor   Ambulatory Poor -   Endurance Deficit   Endurance Deficit Yes   Endurance Deficit Description GENERALIZED DECONDITIONING  Activity Tolerance   Activity Tolerance Patient limited by fatigue   Nurse Made Aware SPOKE WITH [facility]   Exercises   Glute Sets Sitting;15 reps;AROM; Bilateral   Hip Flexion Sitting;15 reps;AROM; Left   Knee AROM Long Arc Quad Sitting;15 reps;AROM;AAROM; Bilateral  (AROM LLE ASSISTED LLE)   Assessment   Prognosis Guarded   Problem List Decreased strength;Decreased endurance; Impaired balance;Decreased mobility; Decreased cognition; Impaired judgement;Decreased safety awareness;Pain   Assessment PRESENTLY SESSION LIMITED TO STRENGHTENING AND TRANSFER TRAINING GIVEN PENDING RADIATION TREATMENT  ONGOING COMPLAINTS OF BACK PAIN PERSIST WITH ATTEMPTS AT BED MOBILITY AND TRANSFER TRAINING TO COMMODE AND STRETCHER  RLE CONTINUES TO DEMONSTRATE ONGOING FOOT R Jose Eduardo [place] 1 WITH EACH TRANSFER ATTEMPT  GIVEN THE PT'S LIMITED ASSISTANCE IN HOME AS WELL AS STAIR OBSTACLE, WOULD CONTINUE TO RECCOMEND ONGOING REHAB UPON D/C  Barriers to Discharge Inaccessible home environment   Goals   Patient Goals TO GO TO BATHROOM   STG Expiration Date 11/13/17   Treatment Day 2   Plan   Treatment/Interventions LE strengthening/ROM; Functional transfer training; Therapeutic exercise; Endurance training;Bed mobility; Compensatory technique education   Progress Progressing toward goals   PT Frequency 5x/wk   Recommendation   Recommendation (REHAB)   Equipment Recommended Wheelchair;Walker   PT - OK to Discharge Yes  (ONCE MEDICALLY CLEARED FOR REHAB)     Darryle Griffin, PTA

## 2017-11-01 NOTE — PLAN OF CARE
DISCHARGE PLANNING     Discharge to home or other facility with appropriate resources Progressing        INFECTION - ADULT     Absence or prevention of progression during hospitalization Progressing     Absence of fever/infection during neutropenic period Progressing        Knowledge Deficit     Patient/family/caregiver demonstrates understanding of disease process, treatment plan, medications, and discharge instructions Progressing        Nutrition/Hydration-ADULT     Nutrient/Hydration intake appropriate for improving, restoring or maintaining nutritional needs Progressing        PAIN - ADULT     Verbalizes/displays adequate comfort level or baseline comfort level Progressing        Potential for Falls     Patient will remain free of falls Progressing        Prexisting or High Potential for Compromised Skin Integrity     Skin integrity is maintained or improved Progressing        SAFETY ADULT     Maintain or return to baseline ADL function Progressing     Maintain or return mobility status to optimal level Progressing     Patient will remain free of falls Progressing

## 2017-11-01 NOTE — SOCIAL WORK
CM s/w admissions at [facility] & they have no beds available at [address] [place] [address]  Cm s/w pt's son & dtr for additional choices  They requested referrals to [facility] [address]

## 2017-11-01 NOTE — OCCUPATIONAL THERAPY NOTE
Occupational Therapy Treatment Note:    Pt was attempted this pm however was unavailable   Will follow  April A MAYA Upton

## 2017-11-01 NOTE — PROGRESS NOTES
[facility] Progress Note  Patient: Eve Gilford 81 y o  female   MRN: [id number]  PCP: Octavia Tsai MD  Unit/Bed#: [facility] 920-01 Encounter: [id number]  Date Of Visit: 11/01/17    Assessment:    Principal Problem:    Cervical cord compression with myelopathy ([address] )  Active Problems:    [facility] ([facility] )    Essential hypertension    Hypothyroidism      Plan:    · Cervical cord compression with myelopathy:  ? likely related to metastatic disease  ? No immediate plans for surgical intervention at this time per Neurosurgery  ? evaluated by Rad Onc to be started on XRT for cervical spine in a day or so  · [facility] at [facility]:  ? XRT from 10/31 for 10 sessions  ? Cont decadron 4q6  · Functional quadriparesis - in the setting of above   She does have good muscle strength in lower extremities with worse right foot  · Metastatic Breast cancer (HCC) - CT of the chest abdomen and pelvis reveals moderate-sized left pleural effusion, multiple hepatic lesions and abnormal appearance of the left adrenal gland suspicious for metastatic disease  Also with bilateral soft tissue masses in the lungs   Patient with an overall poor prognosis  Carleen Adan will follow up with her medical oncologist at the [address] [place] [address] upon discharge  · Moderate left pleural effusion - likely malignant effusion   Patient's respiratory status is stable without immediate need for thoracentesis at this time  · Essential hypertension - cont amlodipine  · Hypothyroidism - continue levothyroxine  · Constipation - continue bowel regimen with lactulose      VTE Pharmacologic Prophylaxis:   Pharmacologic: Heparin  Mechanical VTE Prophylaxis in Place: Yes    Patient Centered Rounds: I have performed bedside rounds with nursing staff today  Discussions with Specialists or Other Care Team Provider:     Education and Discussions with Family / Patient: patient & son    Time Spent for Care: 45 minutes    More than 50% of total time spent on counseling and coordination of care as described above  Current Length of Stay: 5 day(s)    Current Patient Status: Inpatient   Certification Statement: The patient will continue to require additional inpatient hospital stay due to working on DC plan wiht ongoing XRT as outpatient    Discharge Plan / Estimated Discharge Date: once zakiya to find a place    Code Status: Level 1 - Full Code      Subjective:   Feels good, no CP/SOB, no abdo pain/n/v    Objective:     Vitals:   Temp (24hrs), Av 9 °F (36 6 °C), Min:97 6 °F (36 4 °C), Max:98 °F (36 7 °C)    HR:  [78-85] 84  Resp:  [17-19] 18  BP: (105-113)/(59-69) 113/69  SpO2:  [98 %-99 %] 98 %  Body mass index is 26 57 kg/m²  Input and Output Summary (last 24 hours): Intake/Output Summary (Last 24 hours) at 17 1240  Last data filed at 17 0911   Gross per 24 hour   Intake             1360 ml   Output              525 ml   Net              835 ml       Physical Exam:     Physical Exam   Constitutional: She is oriented to person, place, and time  She appears well-developed and well-nourished  HENT:   Head: Normocephalic and atraumatic  Eyes: Conjunctivae are normal  No scleral icterus  Cardiovascular: Normal rate, regular rhythm and normal heart sounds  Exam reveals no gallop and no friction rub  No murmur heard  Pulmonary/Chest: Effort normal and breath sounds normal  No respiratory distress  She has no wheezes  Abdominal: Soft  She exhibits no distension  There is no tenderness  Musculoskeletal: She exhibits no edema  Neurological: She is alert and oriented to person, place, and time       Additional Data:     Labs:      Results from last 7 days  Lab Units 10/31/17  0508   WBC Thousand/uL 5 98   HEMOGLOBIN g/dL 12 3   HEMATOCRIT % 37 1   PLATELETS Thousands/uL 289   NEUTROS PCT % 87*   LYMPHS PCT % 12*   MONOS PCT % 1*   EOS PCT % 0       Results from last 7 days  Lab Units 10/31/17  0508   SODIUM mmol/L 139 POTASSIUM mmol/L 4 1   CHLORIDE mmol/L 106   CO2 mmol/L 26   BUN mg/dL 12   CREATININE mg/dL 0 49*   CALCIUM mg/dL 8 7   GLUCOSE RANDOM mg/dL 101           * I Have Reviewed All Lab Data Listed Above  * Additional Pertinent Lab Tests Reviewed: All Labs Within Last 24 Hours Reviewed    Imaging:    Imaging Reports Reviewed Today Include:   Imaging Personally Reviewed by Myself Includes:      Recent Cultures (last 7 days):           Last 24 Hours Medication List:     acetaminophen 975 mg Oral Q8H [address]   amLODIPine 5 mg Oral Daily   dexamethasone 4 mg Oral Q6H [address]   heparin (porcine) 5,000 Units Subcutaneous Q8H [address]   lactulose 20 g Oral Daily   levothyroxine 100 mcg Oral Early Morning   morphine 15 mg Oral BID   pregabalin 50 mg Oral TID   senna-docusate sodium 1 tablet Oral HS        Today, Patient Was Seen By: Farheen Viera MD    ** Please Note: This note has been constructed using a voice recognition system   **

## 2017-11-02 ENCOUNTER — HOSPITAL ENCOUNTER (INPATIENT)
Facility: HOSPITAL | Age: 72
LOS: 15 days | Discharge: HOME HEALTH | DRG: 947 | End: 2017-11-17
Attending: PHYSICAL MEDICINE & REHABILITATION | Admitting: PHYSICAL MEDICINE & REHABILITATION
Payer: MEDICARE

## 2017-11-02 VITALS
HEART RATE: 70 BPM | WEIGHT: 145.28 LBS | DIASTOLIC BLOOD PRESSURE: 70 MMHG | HEIGHT: 62 IN | TEMPERATURE: 97.4 F | RESPIRATION RATE: 18 BRPM | SYSTOLIC BLOOD PRESSURE: 111 MMHG | OXYGEN SATURATION: 98 % | BODY MASS INDEX: 26.74 KG/M2

## 2017-11-02 DIAGNOSIS — G95.20 CERVICAL CORD COMPRESSION WITH MYELOPATHY (HCC): Primary | ICD-10-CM

## 2017-11-02 PROCEDURE — 97530 THERAPEUTIC ACTIVITIES: CPT

## 2017-11-02 PROCEDURE — 97535 SELF CARE MNGMENT TRAINING: CPT

## 2017-11-02 PROCEDURE — 77387 GUIDANCE FOR RADJ TX DLVR: CPT | Performed by: RADIOLOGY

## 2017-11-02 PROCEDURE — 77412 RADIATION TX DELIVERY LVL 3: CPT | Performed by: RADIOLOGY

## 2017-11-02 PROCEDURE — 77331 SPECIAL RADIATION DOSIMETRY: CPT | Performed by: RADIOLOGY

## 2017-11-02 PROCEDURE — D0061ZZ BEAM RADIATION OF SPINAL CORD USING PHOTONS 1 - 10 MEV: ICD-10-PCS | Performed by: HOSPITALIST

## 2017-11-02 RX ORDER — POLYETHYLENE GLYCOL 3350 17 G/17G
17 POWDER, FOR SOLUTION ORAL DAILY PRN
Status: CANCELLED | OUTPATIENT
Start: 2017-11-02

## 2017-11-02 RX ORDER — PREGABALIN 25 MG/1
50 CAPSULE ORAL 3 TIMES DAILY
Status: CANCELLED | OUTPATIENT
Start: 2017-11-02

## 2017-11-02 RX ORDER — DEXAMETHASONE 4 MG/1
4 TABLET ORAL EVERY 6 HOURS SCHEDULED
Status: CANCELLED | OUTPATIENT
Start: 2017-11-02

## 2017-11-02 RX ORDER — MORPHINE SULFATE 15 MG/1
15 TABLET, FILM COATED, EXTENDED RELEASE ORAL 2 TIMES DAILY
Status: DISCONTINUED | OUTPATIENT
Start: 2017-11-02 | End: 2017-11-17 | Stop reason: HOSPADM

## 2017-11-02 RX ORDER — POLYETHYLENE GLYCOL 3350 17 G/17G
17 POWDER, FOR SOLUTION ORAL DAILY PRN
Status: DISCONTINUED | OUTPATIENT
Start: 2017-11-02 | End: 2017-11-17 | Stop reason: HOSPADM

## 2017-11-02 RX ORDER — ACETAMINOPHEN 325 MG/1
975 TABLET ORAL EVERY 8 HOURS SCHEDULED
Status: CANCELLED | OUTPATIENT
Start: 2017-11-02

## 2017-11-02 RX ORDER — LEVOTHYROXINE SODIUM 0.1 MG/1
100 TABLET ORAL
Status: DISCONTINUED | OUTPATIENT
Start: 2017-11-03 | End: 2017-11-17 | Stop reason: HOSPADM

## 2017-11-02 RX ORDER — ALPRAZOLAM 0.25 MG/1
0.25 TABLET ORAL 2 TIMES DAILY PRN
Status: CANCELLED | OUTPATIENT
Start: 2017-11-02

## 2017-11-02 RX ORDER — AMLODIPINE BESYLATE 5 MG/1
5 TABLET ORAL DAILY
Status: CANCELLED | OUTPATIENT
Start: 2017-11-03

## 2017-11-02 RX ORDER — ACETAMINOPHEN 325 MG/1
975 TABLET ORAL EVERY 8 HOURS SCHEDULED
Status: DISCONTINUED | OUTPATIENT
Start: 2017-11-02 | End: 2017-11-17 | Stop reason: HOSPADM

## 2017-11-02 RX ORDER — AMOXICILLIN 250 MG
1 CAPSULE ORAL
Status: DISCONTINUED | OUTPATIENT
Start: 2017-11-02 | End: 2017-11-17 | Stop reason: HOSPADM

## 2017-11-02 RX ORDER — DEXAMETHASONE 4 MG/1
4 TABLET ORAL EVERY 6 HOURS SCHEDULED
Status: DISCONTINUED | OUTPATIENT
Start: 2017-11-02 | End: 2017-11-15

## 2017-11-02 RX ORDER — LACTULOSE 20 G/30ML
20 SOLUTION ORAL 2 TIMES DAILY
Status: DISCONTINUED | OUTPATIENT
Start: 2017-11-02 | End: 2017-11-17 | Stop reason: HOSPADM

## 2017-11-02 RX ORDER — OXYCODONE HYDROCHLORIDE 10 MG/1
10 TABLET ORAL EVERY 8 HOURS PRN
Status: DISCONTINUED | OUTPATIENT
Start: 2017-11-02 | End: 2017-11-17 | Stop reason: HOSPADM

## 2017-11-02 RX ORDER — HEPARIN SODIUM 5000 [USP'U]/ML
5000 INJECTION, SOLUTION INTRAVENOUS; SUBCUTANEOUS EVERY 8 HOURS SCHEDULED
Status: DISCONTINUED | OUTPATIENT
Start: 2017-11-02 | End: 2017-11-17 | Stop reason: HOSPADM

## 2017-11-02 RX ORDER — LIDOCAINE 50 MG/G
1 PATCH TOPICAL DAILY
Status: DISCONTINUED | OUTPATIENT
Start: 2017-11-02 | End: 2017-11-02 | Stop reason: HOSPADM

## 2017-11-02 RX ORDER — LIDOCAINE 50 MG/G
1 PATCH TOPICAL DAILY
Status: CANCELLED | OUTPATIENT
Start: 2017-11-02

## 2017-11-02 RX ORDER — PANTOPRAZOLE SODIUM 40 MG/1
40 TABLET, DELAYED RELEASE ORAL
Status: DISCONTINUED | OUTPATIENT
Start: 2017-11-03 | End: 2017-11-17 | Stop reason: HOSPADM

## 2017-11-02 RX ORDER — LACTULOSE 20 G/30ML
20 SOLUTION ORAL 2 TIMES DAILY
Status: CANCELLED | OUTPATIENT
Start: 2017-11-02

## 2017-11-02 RX ORDER — AMOXICILLIN 250 MG
1 CAPSULE ORAL
Status: CANCELLED | OUTPATIENT
Start: 2017-11-02

## 2017-11-02 RX ORDER — LEVOTHYROXINE SODIUM 0.1 MG/1
100 TABLET ORAL
Status: CANCELLED | OUTPATIENT
Start: 2017-11-03

## 2017-11-02 RX ORDER — ALPRAZOLAM 0.25 MG/1
0.25 TABLET ORAL 2 TIMES DAILY PRN
Status: DISCONTINUED | OUTPATIENT
Start: 2017-11-02 | End: 2017-11-17 | Stop reason: HOSPADM

## 2017-11-02 RX ORDER — PREGABALIN 50 MG/1
50 CAPSULE ORAL 3 TIMES DAILY
Status: DISCONTINUED | OUTPATIENT
Start: 2017-11-02 | End: 2017-11-10

## 2017-11-02 RX ORDER — ONDANSETRON 4 MG/1
4 TABLET, ORALLY DISINTEGRATING ORAL EVERY 6 HOURS PRN
Status: DISCONTINUED | OUTPATIENT
Start: 2017-11-02 | End: 2017-11-17 | Stop reason: HOSPADM

## 2017-11-02 RX ORDER — AMLODIPINE BESYLATE 5 MG/1
5 TABLET ORAL DAILY
Status: DISCONTINUED | OUTPATIENT
Start: 2017-11-03 | End: 2017-11-17 | Stop reason: HOSPADM

## 2017-11-02 RX ORDER — LIDOCAINE 50 MG/G
1 PATCH TOPICAL DAILY
Status: DISCONTINUED | OUTPATIENT
Start: 2017-11-03 | End: 2017-11-17 | Stop reason: HOSPADM

## 2017-11-02 RX ORDER — MORPHINE SULFATE 15 MG/1
15 TABLET, FILM COATED, EXTENDED RELEASE ORAL 2 TIMES DAILY
Status: CANCELLED | OUTPATIENT
Start: 2017-11-02

## 2017-11-02 RX ORDER — ALPRAZOLAM 0.25 MG/1
0.25 TABLET ORAL 2 TIMES DAILY PRN
Status: DISCONTINUED | OUTPATIENT
Start: 2017-11-02 | End: 2017-11-02 | Stop reason: HOSPADM

## 2017-11-02 RX ORDER — BISACODYL 10 MG
10 SUPPOSITORY, RECTAL RECTAL DAILY PRN
Status: DISCONTINUED | OUTPATIENT
Start: 2017-11-02 | End: 2017-11-17 | Stop reason: HOSPADM

## 2017-11-02 RX ORDER — OXYCODONE HYDROCHLORIDE 10 MG/1
10 TABLET ORAL EVERY 8 HOURS PRN
Status: CANCELLED | OUTPATIENT
Start: 2017-11-02

## 2017-11-02 RX ADMIN — HEPARIN SODIUM 5000 UNITS: 5000 INJECTION, SOLUTION INTRAVENOUS; SUBCUTANEOUS at 13:44

## 2017-11-02 RX ADMIN — ACETAMINOPHEN 975 MG: 325 TABLET, FILM COATED ORAL at 21:25

## 2017-11-02 RX ADMIN — PREGABALIN 50 MG: 25 CAPSULE ORAL at 09:18

## 2017-11-02 RX ADMIN — ANORECTAL OINTMENT 1 APPLICATION: 15.7; .44; 24; 20.6 OINTMENT TOPICAL at 15:46

## 2017-11-02 RX ADMIN — OXYCODONE HYDROCHLORIDE 10 MG: 10 TABLET ORAL at 11:02

## 2017-11-02 RX ADMIN — ALPRAZOLAM 0.25 MG: 0.25 TABLET ORAL at 15:46

## 2017-11-02 RX ADMIN — ONDANSETRON 4 MG: 4 TABLET, ORALLY DISINTEGRATING ORAL at 21:25

## 2017-11-02 RX ADMIN — DEXAMETHASONE 4 MG: 4 TABLET ORAL at 13:44

## 2017-11-02 RX ADMIN — ANORECTAL OINTMENT: 15.7; .44; 24; 20.6 OINTMENT TOPICAL at 09:16

## 2017-11-02 RX ADMIN — MORPHINE SULFATE 15 MG: 15 TABLET, EXTENDED RELEASE ORAL at 18:56

## 2017-11-02 RX ADMIN — LACTULOSE 20 G: 20 SOLUTION ORAL at 09:15

## 2017-11-02 RX ADMIN — MORPHINE SULFATE 15 MG: 15 TABLET, EXTENDED RELEASE ORAL at 09:15

## 2017-11-02 RX ADMIN — DEXAMETHASONE 4 MG: 4 TABLET ORAL at 23:59

## 2017-11-02 RX ADMIN — HEPARIN SODIUM 5000 UNITS: 5000 INJECTION, SOLUTION INTRAVENOUS; SUBCUTANEOUS at 05:24

## 2017-11-02 RX ADMIN — ANORECTAL OINTMENT 1 APPLICATION: 15.7; .44; 24; 20.6 OINTMENT TOPICAL at 21:24

## 2017-11-02 RX ADMIN — DEXAMETHASONE 4 MG: 4 TABLET ORAL at 05:24

## 2017-11-02 RX ADMIN — DEXAMETHASONE 4 MG: 4 TABLET ORAL at 18:56

## 2017-11-02 RX ADMIN — HEPARIN SODIUM 5000 UNITS: 5000 INJECTION, SOLUTION INTRAVENOUS; SUBCUTANEOUS at 21:25

## 2017-11-02 RX ADMIN — LEVOTHYROXINE SODIUM 100 MCG: 100 TABLET ORAL at 05:24

## 2017-11-02 RX ADMIN — ALPRAZOLAM 0.25 MG: 0.25 TABLET ORAL at 23:59

## 2017-11-02 RX ADMIN — Medication 1 TABLET: at 21:25

## 2017-11-02 RX ADMIN — PREGABALIN 50 MG: 50 CAPSULE ORAL at 21:25

## 2017-11-02 RX ADMIN — ACETAMINOPHEN 975 MG: 325 TABLET, FILM COATED ORAL at 05:24

## 2017-11-02 RX ADMIN — ACETAMINOPHEN 975 MG: 325 TABLET, FILM COATED ORAL at 13:44

## 2017-11-02 RX ADMIN — AMLODIPINE BESYLATE 5 MG: 5 TABLET ORAL at 09:15

## 2017-11-02 RX ADMIN — LIDOCAINE 1 PATCH: 50 PATCH TOPICAL at 15:46

## 2017-11-02 RX ADMIN — PREGABALIN 50 MG: 25 CAPSULE ORAL at 15:49

## 2017-11-02 NOTE — PROGRESS NOTES
PHYSICAL MEDICINE AND REHABILITATION   PREADMISSION ASSESSMENT     Projected [facility] and [facility] Diagnoses:  Impairment of mobility, safety and Activities of Daily Living (ADLs) due to Spinal Cord Dysfunction:  Non-Traumatic:  04 130 Other Non-Traumatic     Etiology: Cervical Cord Compression with Myelopathy  Date of Onset: 10/27/17   Date of surgery: N/A    PATIENT INFORMATION  Name: Adams Vyas Phone #: [phone] (home)   Address: [place] [address]  YOB: 1945 Age: 81 y o  SS#   Marital Status:   Ethnicity:   Employment Status: retired  Extended Emergency Contact Information  Primary Emergency Contact: [address] Phone: [phone]  Relation: Other  Secondary Emergency Contact: [address] Phone: [phone]  Relation: Child  Advance Directive: Level 1 Full Code, AD Unknown     INSURANCE/COVERAGE:     Primary Payor: MEDICARE / Plan: MEDICARE A AND B / Product Type: Medicare A & B Fee for Service /   Secondary Payer: AAR   Payer Contact:  Payer Contact:   Contact Phone:  Contact Phone:   Authorization #:   Coverage Dates:  LCD:  Medicare ID #:  [id number]  Medicare Days: 60/30/60  Medical Record #: [id number]    REFERRAL SOURCE:   Referring provider: Andrew Ba MD  Referring facility: [address] [place] [address]  Room: [place] [address]/[place] [address]  PCP: Vannessa Bonilla MD PCP phone number: [phone]    MEDICAL INFORMATION  HPI: Patient presented to [facility] as a direct admit from out patient neurology office due to MRI showing spinal cord compression  She presented with ambulatory dysfunction that left her with functional quadriparesis requiring her son to carry her around the house and family to complete her ADLs    MRI completed at [facility] on 10/28/17 showed Diffuse abnormal cord signal with mild expansion of the spinal cord extending from T7-T8 inferiorly to the conus medullaris  No abnormal enhancement of the spinal cord within this region  Although the findings may represent the sequela of prior radiation therapy (radiation myelitis) the overall extent appears somewhat greater in which is usually seen  Metastatic disease not excluded  Heterogeneously enhancing, expansile soft tissue mass involving the distal spinal cord/filum terminale  Findings most compatible with metastatic disease  Extradural enhancing soft tissue mass posterior to the L1 vertebral body  Findings most compatible with metastatic disease  Abnormal appearance of the T7 and T8 vertebral bodies, most compatible with the history of metastatic disease  Neurosurgery was consulted and followed this patient stating that no surgical intervention was warranted at this time  Decadron was ordered and continues at this time  Hematology/ Oncology was consulted and recommended that she be evaluated by radiation/oncology for possible for cervical spine and she had previous radiation for T8 bone metastasis  Patient had previous radiation and chemotherapy through [address] [place] [address] and was most recently was initiated on Xeloda in September  However it was not continued during her hospital course  She will continue to follow up with [address] [place] [address] upon discharge from rehab for new finding of hepatic lesions and malignant left pleural effusion  Oncology/Radiation was consulted and increased her dose of decadron and ordered palliative radiation at the cons medullaris for 10 sessions  Of note patient was seen by wound care for left and mid buttock Stage II Pressure Ulcers for local wound care without complication  At this time acute inpatient rehab is being recommended          Past Medical History:   Past Surgical History: Allergies:     Past Medical History:   Diagnosis Date    Breast cancer ([address] )     Disease of thyroid gland     No past surgical history on file    Allergies   Allergen Reactions    Gabapentin GI Intolerance    Hydrocodone GI Intolerance    Ibuprofen Rash         Comorbidities: Breast Cancer with mets to the c-spine and t-spine, bone, liver, adrenal gland and bone; Essential Hypertension, Hypothyroidism, Moderate Left Pleural Effusion, Left Buttock Stage II, Mid Buttock Stage II , Functional Quadriparesis, Currently being treated with radiation, Peripheral neuropathy, Right foot drop     CURRENT VITAL SIGNS:   Temp:  [97 2 °F (36 2 °C)-98 3 °F (36 8 °C)] 97 6 °F (36 4 °C)  HR:  [76-78] 76  Resp:  [16-18] 16  BP: (113-127)/(68-73) 119/69   Intake/Output Summary (Last 24 hours) at 11/02/17 1420  Last data filed at 11/02/17 1355   Gross per 24 hour   Intake              820 ml   Output             1025 ml   Net             -205 ml        LABORATORY RESULTS:      Lab Results   Component Value Date    HGB 12 3 10/31/2017    HCT 37 1 10/31/2017    WBC 5 98 10/31/2017     Lab Results   Component Value Date    BUN 12 10/31/2017     10/31/2017    K 4 1 10/31/2017     10/31/2017    GLUCOSE 101 10/31/2017    CREATININE 0 49 (L) 10/31/2017     No results found for: PROTIME, INR     DIAGNOSTIC STUDIES:  Mri Brain W Wo Contrast    Result Date: 10/31/2017  Impression: No evidence of intracranial metastasis  Left superior neck mass measuring 1 6 cm, best seen on series 5 image 4 suspicious for metastatic adenopathy  This is located within the superior aspect of the posterior triangle deep to the sternocleidomastoid muscle  Complete opacification of the maxillary sinuses bilaterally  Workstation performed: [id number]     Mri Cervical Spine W Contrast    Result Date: 10/28/2017  Impression: 1  Suboptimal examination due to patient motion  2   Area of cord enhancement within the cervical spinal cord at the level of the C4-C5 interspace on the right  This is within the area of cord expansion and cortical signal abnormality seen on the precontrast MRI of October 25, 2017    Although the findings may be the sequela of central stenosis from progressive degenerative changes of the cervical spine or less likely prior radiation therapy (radiation myelitis), given the rapid onset of symptoms, metastatic disease not excluded  3   Degenerative changes of the cervical spine, similar to the recent MRI  ##phoslh##phoslh ##cfslh I personally discussed this result with Hanna Keith on 10/28/2017 3:28 PM  ## Workstation performed: [id number]     Mri Thoracic Spine W Wo Contrast    Result Date: 10/28/2017  Impression: 1  Suboptimal examination due to patient motion  2   Diffuse abnormal cord signal with mild expansion of the spinal cord extending from T7-T8 inferiorly to the conus medullaris  No abnormal enhancement of the spinal cord within this region  Although the findings may represent the sequela of prior radiation therapy (radiation myelitis) the overall extent appears somewhat greater in which is usually seen  Metastatic disease not excluded  3   Heterogeneously enhancing, expansile soft tissue mass involving the distal spinal cord/filum terminale  Findings most compatible with metastatic disease  4   Extradural enhancing soft tissue mass posterior to the L1 vertebral body  Findings most compatible with metastatic disease  5   Abnormal appearance of the T7 and T8 vertebral bodies, most compatible with the history of metastatic disease  ##cfslh I personally discussed this result with Hanna Keith on 10/28/2017 3:28 PM  ## Workstation performed: [id number]     Mri Lumbar Spine W Wo Contrast    Result Date: 10/31/2017  Impression: Large expansile intramedullary mass within the distal cord and conus measuring 3 cm in craniocaudad dimension with heterogeneous enhancement  Extensive edema extending superiorly into the lower thoracic cord above the scope of this exam   Smaller extra medullary mass on the left posterior lateral aspect of the conus, unchanged as well   See separate MRI of the thoracic spine dated 10/28/2017  No other enhancement identified within the lumbar canal  There is a focal marrow replacing lesion within the L1 vertebral body consistent with osseous metastasis  Lumbar degenerative disc disease with mild canal stenosis and mild to moderate foraminal narrowing described in detail above  Workstation performed: [id number]     Ct Chest Abdomen Pelvis W Contrast    Result Date: 10/28/2017  Impression: 1  Bilateral soft tissue masses with intervening air bronchograms in the lungs bilaterally, largest in the left upper lobe  Differential includes multifocal pneumonia versus pulmonary neoplasm  These do not have a typical appearance for metastatic disease  2   Moderate-sized left pleural effusion with compressive atelectasis left lower lobe  3   Multiple hepatic lesions, suspicious for metastatic disease  4   Abnormal appearance of the left adrenal gland suspicious for metastatic disease  5   Multiple bony lesions, consistent with the known history of metastatic disease  Workstation performed: [id number]     Ct Radiation Therapy    Result Date: 10/30/2017  Impression: CT scan for radiation therapy planning  Unchanged irregular left apical masslike density, and moderate left pleural effusion  Hepatic and left adrenal metastatic disease   Workstation performed: [id number]       PRECAUTIONS/SPECIAL NEEDS:  Anticoagulation:  heparin, Edema Management, Safety Concerns, Pain Management, Dietary Restrictions: Cardiac Step 1, Sodium 2 Gram, Radiation Therapy: as per radiation/oncology and fall precautions    MEDICATIONS:     Current Facility-Administered Medications:     acetaminophen (TYLENOL) tablet 975 mg, 975 mg, Oral, Q8H [address], Avery Sams PA-C, 975 mg at 11/02/17 1344    amLODIPine (NORVASC) tablet 5 mg, 5 mg, Oral, Daily, Lisa Wright MD, 5 mg at 11/02/17 0915    dexamethasone (DECADRON) tablet 4 mg, 4 mg, Oral, Q6H [address], Lisa Wright MD, 4 mg at 11/02/17 1344    gadobutrol injection (MULTI-DOSE) SOLN 6 mL, 6 mL, Intravenous, Once in imaging, Efrem Singer MD    heparin (porcine) subcutaneous injection 5,000 Units, 5,000 Units, Subcutaneous, Q8H [address], 5,000 Units at 11/02/17 1344 **AND** Platelet count, , , Once, Tanmay Marin MD    HYDROmorphone (DILAUDID) 1 mg/mL injection 0 2 mg, 0 2 mg, Intravenous, Q4H PRN, Gaetano Lopez PA-C, 0 2 mg at 10/30/17 2122    lactulose 20 g/30 mL oral solution 20 g, 20 g, Oral, Daily, Efrem Singer MD, 20 g at 11/02/17 0915    levothyroxine tablet 100 mcg, 100 mcg, Oral, Early Morning, Efrem Singer MD, 100 mcg at 11/02/17 0524    menthol-zinc oxide (CALMOSEPTINE) 0 44-20 6 % ointment, , Topical, TID, KAY Alonzo    morphine (MS CONTIN) ER tablet 15 mg, 15 mg, Oral, BID, Efrem Singer MD, 15 mg at 11/02/17 0915    naloxone (NARCAN) 0 04 mg/mL syringe 0 04 mg, 0 04 mg, Intravenous, Q1MIN PRN, JANE AdamsC    ondansetron [facility]) injection 4 mg, 4 mg, Intravenous, Q6H PRN, Tanmay Marin MD    oxyCODONE (ROXICODONE) immediate release tablet 10 mg, 10 mg, Oral, Q8H PRN, EVON Adams-C, 10 mg at 11/02/17 1102    polyethylene glycol (MIRALAX) packet 17 g, 17 g, Oral, Daily PRN, Gaetano Lopez PA-C    pregabalin (LYRICA) capsule 50 mg, 50 mg, Oral, TID, Efrem Singer MD, 50 mg at 11/02/17 8695    senna-docusate sodium (SENOKOT S) 8 6-50 mg per tablet 1 tablet, 1 tablet, Oral, HS, Gaetano Lopez PA-C, 1 tablet at 11/01/17 2115    SKIN INTEGRITY:   no rashes, no erythema, no peripheral edema, Stage II pressre ulcer to left buttock KARMA, Stage II pressure ulcer to mid buttock KARMA    PRIOR LEVEL OF FUNCTION:  She lives in a(n) single family home  Rik Laser is  and lives with her son and his family    Self Care: Dependent, Indoor Mobility: Dependent, Stairs (in/outdoor): Dependent and Cognition: Independent   For approximately 2 weeks prior to admission patient was completely dependent for ADLs and transfers and ambulation due to ambulatory dysfunction brought on by cord compression  Before that patient was a modified independent with transfers and ambulation with rolling walker and needed some assistance with ADLs and complete assistance with ADLs  HOME ENVIRONMENT:  The living area: can live on one level  There 3 steps to enter the home  The patient will have 24 hour supervision/physical assistance available upon discharge  Patient lives with a very supportive son and daughter in law  Some one is home with her 24/7 and can provide supervision and physical assistance  PREVIOUS DME:  Equipment in home (previous DME): Rolling Walker and Manual Wheelchair    FUNCTIONAL STATUS:  Physical Therapy Occupational Therapy Speech Therapy     11/01/17 1400   Pain Assessment   Pain Assessment 0-10   Pain Score 5   Pain Type Acute pain   Pain Location Back   Pain Orientation Bilateral   Pain Descriptors Aching;Dull   Pain Frequency Constant/continuous   Pain Onset Ongoing   Clinical Progression Gradually improving   Effect of Pain on Daily Activities LIMITED MOBILITY  Patient's Stated Pain Goal No pain   Hospital Pain Intervention(s) Repositioned; Ambulation/increased activity   Response to Interventions PT  AGREEABLE TO ATTEMPT TRANSFER TO COMMODE BEFORE REDIATION  Multiple Pain Sites No   Pain Rating: FLACC (Rest) - Face 1   Pain Rating: FLACC (Rest) - Legs 0   Pain Rating: FLACC (Rest) - Activity 0   Pain Rating: FLACC (Rest) - Cry 0   Pain Rating: FLACC (Rest) - Consolability 0   Score: FLACC (Rest) 1   Restrictions/Precautions   Weight Bearing Precautions Per Order No   Other Precautions Cognitive; Chair Alarm; Bed Alarm; Fall Risk;Pain   General   Chart Reviewed Yes   Response to Previous Treatment Patient with no complaints from previous session  Family/Caregiver Present Yes   Cognition   Overall Cognitive Status Impaired   Arousal/Participation Alert; Cooperative   Attention Attends with cues to redirect Orientation Level Oriented X4   Memory Decreased recall of precautions   Following Commands Follows one step commands without difficulty   Subjective   Subjective THE PT  REPORT ONLY TRANSFERING FROM BED TO CHAIR/COMMODE  PT  WANTING TO TRANSFER TO COMMODE BEFORE RADIATION  Bed Mobility   Supine to Sit 4  Minimal assistance   Additional items Assist x 1;HOB elevated; Increased time required;Verbal cues   Sit to Supine 4  Minimal assistance   Additional items Assist x 2; Increased time required;LE management  (LOG ROLL ONTO STRETCHER )   Transfers   Sit to Stand 3  Moderate assistance   Additional items Assist x 1; Armrests; Increased time required;Verbal cues   Stand to Sit 3  Moderate assistance   Additional items Assist x 1; Armrests; Increased time required;Verbal cues   Stand pivot 3  Moderate assistance   Additional items Assist x 1; Armrests; Increased time required;Verbal cues  (TO COMMODE AND THEN STRETCHER FOR RADIATION )   Toilet transfer 3  Moderate assistance   Additional items Assist x 1; Armrests; Increased time required;Commode;Verbal cues   Balance   Static Sitting Fair   Static Standing Poor   Ambulatory Poor -   Endurance Deficit   Endurance Deficit Yes   Endurance Deficit Description GENERALIZED DECONDITIONING  Activity Tolerance   Activity Tolerance Patient limited by fatigue   Nurse Made Aware SPOKE WITH [facility]   Exercises   Glute Sets Sitting;15 reps;AROM; Bilateral   Hip Flexion Sitting;15 reps;AROM; Left   Knee AROM Long Arc Quad Sitting;15 reps;AROM;AAROM; Bilateral  (AROM LLE ASSISTED LLE)   Assessment   Prognosis Guarded   Problem List Decreased strength;Decreased endurance; Impaired balance;Decreased mobility; Decreased cognition; Impaired judgement;Decreased safety awareness;Pain   Assessment PRESENTLY SESSION LIMITED TO STRENGHTENING AND TRANSFER TRAINING GIVEN PENDING RADIATION TREATMENT  ONGOING COMPLAINTS OF BACK PAIN PERSIST WITH ATTEMPTS AT BED MOBILITY AND TRANSFER TRAINING TO COMMODE AND STRETCHER  RLE CONTINUES TO DEMONSTRATE ONGOING FOOT DRAG WITH EACH TRANSFER ATTEMPT  GIVEN THE PT'S LIMITED ASSISTANCE IN HOME AS WELL AS STAIR OBSTACLE, WOULD CONTINUE TO RECCOMEND ONGOING REHAB UPON D/C  []Hover for attribution information     Occupational Therapy       11/02/17 9127   Restrictions/Precautions   Weight Bearing Precautions Per Order No   Other Precautions Cognitive; Chair Alarm; Bed Alarm; Fall Risk;Pain   Pain Assessment   Pain Assessment 0-10   Pain Score 5   Pain Type Acute pain   Pain Location Neck; Shoulder   Pain Orientation Left   ADL   Where Assessed Edge of bed  (sitting )   UB Dressing Assistance 4  Minimal Assistance   UB Dressing Deficit Pull down in back   UB Dressing Comments able to thread arms through gown and put over head  LB Dressing Assistance 2  Maximal Assistance   LB Dressing Deficit Don/doff R sock  (Min to thread both legs for pants with close supervision)   Toileting Assistance  2  Maximal Assistance   Toileting Deficit Clothing management up;Clothing management down   Toileting Comments required assistance 2* to weak left hand   Bed Mobility   Supine to Sit 5  Supervision   Sit to Supine 4  Minimal assistance   Additional items Assist x 1   Additional Comments helped support right leg  pt able to hold leg and bring back into bed  Transfers   Sit to Stand 3  Moderate assistance   Additional items Assist x 1;Verbal cues   Stand to Sit 3  Moderate assistance   Additional items Assist x 1;Verbal cues  (verbal cues to reach back when sitting)   Functional Mobility   Functional Mobility 2  Maximal assistance   Additional Comments side stepped next to bed to move higher up  knee buckled  Additional items (no ad)   Assessment   Assessment Pt participated in pm ot treatment session focusing on lb and ub dressing, functional transfers, and functional mobility  Pt sat EOB with good balance throughout treatment session   Pt required overall min A  To thread clothes however needed Max A to pull pants up and down  Pt was able to side step with no ad at  bedside with Max A  Pts right knee buckled while sidestepping  Pt reported son had been dressing her the past week  Pt required  Max assistance to shaylee right sock 2* to c/o "drop foot"  Pt was able to bend down without LOB while sitting EOB to reach socks to take       N/A     CURRENT GAP IN FUNCTION     Prior to Admission:     Functional Status: Prior to admission patient was completely dependent with ADLs and IADLs for approximately 2 weeks due to ambulatory dysfunction brought on by spinal cord compression  Prior to that patient was a modified independent with transfers and ambulation with rolling walker and needed some assistance with bathing as well as complete assistance with IADLs  At this time she is a moderate assist with transfers and ambulation with rolling walker and a moderate to maximal assist for ADLs  Estimated length of stay: 10 to 14 days    Anticipated Post-Discharge Disposition/Treatment  Disposition: Return to previous home/apartment  Outpatient Services: Physical Therapy (PT) and Occupational Therapy (OT)    BARRIERS TO DISCHARGE  Weakness, Pain, Fatigue, Home Accessibility, Financial Resources, Equipment Needs and Resource Availability    INTERVENTIONS FOR DISCHARGE  Adaptive equipment, Patient/Family/Caregiver Education, [employer], Financial Assistance, Arrange DME needs, Medication Changes as per MD, Therapy exercises and Energy Conservation Education    REQUIRED THERAPY:  Patient will require PT and OT 90 minutes each per day, five days per week to achieve rehab goals       REQUIRED FUNCTIONAL AND MEDICAL MANAGEMENT FOR INPATIENT REHABILITATION:  Skin:  Stage II Pressure Ulcer to Left Buttock, Stage II Pressure Ulcer to Mid Buttock, Pain Management: Overall pain is moderately controlled, Deep Vein Thrombosis (DVT) Prophylaxis:  heparin and SCD's while in bed, SLIM management, PMR management, PT/OT intervention, patient and family education and training, nursing management and any consults as needed, Palliative and Hospice Consult for guarded prognosis     RECOMMENDED LEVEL OF CARE:  Patient presented to [facility] as a direct admit from an out patient neurology office  She presented with ambulatory dysfunction for 2 weeks leaving her wheel chair bound  Imaging showed:  ? MRI cervical spine with contrast: expansion of cervical cord C3-6 with 6mm x 9mm enhancing (likely intramedullary mass) at level of C4-5  suboptimal exam due to motion and inability to complete sequences  ? MRI thoracic spine: cord signal change from T7 to conus medullaris with mild expansion of spinal cord  2 7 x 1 3cm soft tissue mass arising from conus medullaris (likely intramedullary) in addition to 2 6x1 3 cm mass (likely extradural) adjacent to left lateral conus lesion  Multiple levels of thoracic herniation without significant stenosis  ? CT chest abdomen pelvis: left pleural effusion, multiple hepatic lesions, left adrenal mass, multiple bony lesions  No surgical intervention is warranted at this time  She has had two radiation treatments and will continue with 8 more while on the ARC  Prior to admission patient was completely dependent with ADLs and IADLs for approximately 2 weeks due to ambulatory dysfunction brought on by spinal cord compression  Prior to that patient was a modified independent with transfers and ambulation with rolling walker and needed some assistance with bathing as well as complete assistance with IADLs  At this time she is a moderate assist with transfers and ambulation with rolling walker and a moderate to max assist with ADLs    Nursing management is warranted for education on medication changes, SLIM management for medical conditions, Palliative management due to guarded prognosis, PMR management to maximize over all functional abilities, and inpatient rehab to maximize self care and mobility upon discharge to home with support of her family

## 2017-11-02 NOTE — PLAN OF CARE
Problem: OCCUPATIONAL THERAPY ADULT  Goal: Performs self-care activities at highest level of function for planned discharge setting  See evaluation for individualized goals  Treatment Interventions: ADL retraining, Functional transfer training, Endurance training, Cognitive reorientation, Patient/family training, Equipment evaluation/education, Compensatory technique education, Energy conservation, Activityengagement          See flowsheet documentation for full assessment, interventions and recommendations  Outcome: Progressing  Limitation: Decreased ADL status, Decreased Safe judgement during ADL, Decreased endurance, Decreased self-care trans, Decreased high-level ADLs, Decreased cognition  Prognosis: Fair, Good  Assessment: Pt participated in pm ot treatment session focusing on lb and ub dressing, functional transfers, and functional mobility  Pt sat EOB with good balance throughout treatment session  Pt required overall min A  To thread clothes however needed Max A to pull pants up and down  Pt was able to side step with no ad at  bedside with Max A  Pts right knee buckled while sidestepping  Pt reported son had been dressing her the past week  Pt required  Max assistance to shaylee right sock 2* to c/o "drop foot"  Pt was able to bend down without LOB while sitting EOB to reach socks to take      OT Discharge Recommendation: Short Term Rehab  OT - OK to Discharge:  Yes  April MAYA Ugarte

## 2017-11-02 NOTE — PROGRESS NOTES
Patient complained that the barrier cream stocked on the units was not working for the stage 2 on the sacrum  Notified SLIM, awaiting orders

## 2017-11-02 NOTE — CONSULTS
Consultation - Marquita Ahumada Holdenjones 81 y o  female MRN: [id number]    Unit/Bed#: -34 Encounter: [id number]        History of Present Illness     HPI: Sean Hammond is a 81y o  year old female with a past medical history of metastatic breast cancer originally diagnosed in 2010, hypertension presented as a direct admission from her neurosurgeons office due to cervical spinal cord compression seen on MRI  She developed progressive quadraparesis over one month  SHe had been having these symptoms for 6-7 months requiring use of walker to ambulate  She previously had radiation therapy to the thoracic spine  MRI of the cervical spine revealed C3-C6 spinal cord compression with intramedullary signal change correlating with symptoms of myelopathy  She was evaluated by neurosurgery and H/O  MRI of the cervical, thoracic revealed expansion of cervical cord C3-6 with 6mm x 9mm enhancing (likely intramedullary mass) at level of C4-5 and signal change from T7 to conus medullaris with mild expansion of spinal cord  2 7 x 1 3cm soft tissue mass arising from conus medullaris (likely intramedullary) in addition to 2 6x1 3 cm mass (likely extradural) adjacent to left lateral conus lesion  CT of the CAP revealed left moderate pleural effusion, multiple hepatic lesions and multiple bony lesions  She was felt not be a surgical candidate due to significant metastatic disease  She was placed on decadron  Radiation oncology recommended obtaining MRI of brain and Lumbar spine with contrast which again demonstrated large expansile intramedullary mass and the distal cord and MRI of the brain was negative  Palliative radiation recommended  She was deemed stable and transferred to [facility] for ongoing therapy    ROS:  Constitutional: Negative  HENT: Negative  Respiratory: Negative  Cardiovascular: Negative  Gastrointestinal: Negative      Musculoskeletal: + cevical pain, generalized weakness  Neurological: + neuropathy bilateral hands and feet  Psychiatric/Behavioral: Negative          Historical Information   Past Medical History:   Diagnosis Date    Arthritis     Asthma     Breast cancer ([address] )     Disease of thyroid gland     Hypertension      Past Surgical History:   Procedure Laterality Date    APPENDECTOMY      BREAST SURGERY      HYSTERECTOMY       Social History   History   Alcohol Use No     History   Drug Use No     History   Smoking Status    Never Smoker   Smokeless Tobacco    Not on file     Family History   Problem Relation Age of Onset    Hypertension Mother     Diabetes Mother        Meds/Allergies   current meds:  Current Facility-Administered Medications   Medication Dose Route Frequency    acetaminophen (TYLENOL) tablet 975 mg  975 mg Oral Q8H [address]    ALPRAZolam (XANAX) tablet 0 25 mg  0 25 mg Oral BID PRN    [START ON 11/3/2017] amLODIPine (NORVASC) tablet 5 mg  5 mg Oral Daily    bisacodyl (DULCOLAX) rectal suppository 10 mg  10 mg Rectal Daily PRN    dexamethasone (DECADRON) tablet 4 mg  4 mg Oral Q6H [address]    lactulose 20 g/30 mL oral solution 20 g  20 g Oral BID    [START ON 11/3/2017] levothyroxine tablet 100 mcg  100 mcg Oral Early Morning    [START ON 11/3/2017] lidocaine (LIDODERM) 5 % patch 1 patch  1 patch Transdermal Daily    menthol-zinc oxide (CALMOSEPTINE) 0 44-20 6 % ointment 1 application  1 application Topical TID    morphine (MS CONTIN) ER tablet 15 mg  15 mg Oral BID    ondansetron (ZOFRAN-ODT) dispersible tablet 4 mg  4 mg Oral Q6H PRN    oxyCODONE (ROXICODONE) immediate release tablet 10 mg  10 mg Oral Q8H PRN    polyethylene glycol (MIRALAX) packet 17 g  17 g Oral Daily PRN    pregabalin (LYRICA) capsule 50 mg  50 mg Oral TID    senna-docusate sodium (SENOKOT S) 8 6-50 mg per tablet 1 tablet  1 tablet Oral HS       PTA meds:   Prescriptions Prior to Admission   Medication    amLODIPine (NORVASC) 5 mg tablet    levothyroxine 100 mcg tablet    morphine (MS CONTIN) 15 mg 12 hr tablet    oxyCODONE (ROXICODONE) 5 mg immediate release tablet    pregabalin (LYRICA) 50 mg capsule    prochlorperazine (COMPAZINE) 5 mg tablet     Allergies   Allergen Reactions    Gabapentin GI Intolerance    Hydrocodone GI Intolerance    Ibuprofen Rash       Objective   Vitals: Blood pressure 109/62, pulse 78, temperature 97 6 °F (36 4 °C), temperature source Oral, resp  rate 18, height 5' 2" (1 575 m), weight 68 kg (149 lb 14 4 oz), SpO2 100 %  Physical Exam   Constitutional: Pt is in NAD, nontoxic   HENT: nares patent, oropharynx negative for thrush  Head: Normocephalic  Eyes: EOM are normal  Pupils are equal, round, and reactive to light  Neck: Neck supple  Cardiovascular: Normal rate and regular rhythm  No murmur heard  Pulmonary/Chest: Breath sounds decreased LLL  No respiratory distress  Pt has no wheezes  Pt has no rales  Abdominal: Soft  Bowel sounds are normal  Pt exhibits no distension  There is no tenderness  There is no rebound and no guarding  Extremities: no LE edema  Neurological: Pt is alert and oriented, 4+/5 bilateral UE, 4/5 LLE and 3/5  RLE  Psychiatric: Pt has a normal mood and affect  Lab Results:   Results from last 7 days  Lab Units 10/31/17  0508 10/28/17  0546   WBC Thousand/uL 5 98 3 11*   HEMOGLOBIN g/dL 12 3 11 8   HEMATOCRIT % 37 1 36 7   PLATELETS Thousands/uL 289 265       Results from last 7 days  Lab Units 10/31/17  0508 10/29/17  0457   SODIUM mmol/L 139 139   POTASSIUM mmol/L 4 1 3 8   CHLORIDE mmol/L 106 107   CO2 mmol/L 26 25   BUN mg/dL 12 11   CREATININE mg/dL 0 49* 0 58*   GLUCOSE RANDOM mg/dL 101 120   CALCIUM mg/dL 8 7 8 7               Glucose (mg/dL)   Date Value   10/31/2017 101   10/29/2017 120   10/28/2017 85   10/27/2017 91       Labs reviewed    Imaging: reviewed  EKG, Pathology, and Other Studies: I have personally reviewed pertinent reports      VTE Prophylaxis: Heparin    Code Status: Level 1 - Full Code     Assessment/Plan 1  Cervical cord compression with myelopathy/Mass at Conus Medullaris: Likely related to metastatic disease  Currently undergoing XRT for 10 sessions to lumbar and cervical spine  Continue Decadron 4mg Q6 hours  Pain control  Therapy per primary team    2  Metastatic breast cancer: patient with moderate left pleural effusion, multiple hepatic lesions, possible adrenal mass and multiple bony lesions  Overall prognosis poor  Outpatient follow up with primary oncologist  Previously on Xeloda  3  Left pleural effusion: likely malignant  Monitor respiratory status  Currently denies complaints of SOB  4  Hypertension: continue norvasc and monitor blood pressure  5  Hypothyroidism: continue levothyroxine        6  Peripheral neuropathy: continue Lyrica        7  DVT prophylaxis: SQ heparin    Counseling / Coordination of Care  Total floor / unit time spent today 60 minutes  Greater than 50% of total time was spent with the patient and / or family counseling and / or coordination of care        Deanna Pollock PA-C

## 2017-11-02 NOTE — PLAN OF CARE
Problem: PHYSICAL THERAPY ADULT  Goal: Performs mobility at highest level of function for planned discharge setting  See evaluation for individualized goals  Treatment/Interventions: Functional transfer training, LE strengthening/ROM, Elevations, Therapeutic exercise, Endurance training, Patient/family training, Equipment eval/education, Bed mobility, Gait training  Equipment Recommended: Wheelchair, Janeth Jackson       See flowsheet documentation for full assessment, interventions and recommendations  Outcome: Progressing  Prognosis: Fair  Problem List: Decreased strength, Decreased endurance, Impaired balance, Decreased mobility, Decreased cognition, Impaired judgement, Decreased safety awareness, Pain  Assessment: The pt  was fatigued this afternoon, and she was unable to ambulate any distance today  She was able to transfer several times in order to facilitate out of bed to the commode  She continues to remain significantly limited from her baseline, and she will require inpatient rehab at discharge  Barriers to Discharge: Inaccessible home environment  Barriers to Discharge Comments: JAN AND STEPS INSIDE  Recommendation: Post acute IP rehab     PT - OK to Discharge: (S) Yes ([address])    See flowsheet documentation for full assessment

## 2017-11-02 NOTE — PHYSICAL THERAPY NOTE
Physical Therapy Cancellation Note     The pt  Is off of the floor at radiation at this time  Will re-attempt as able      Digna Ponce, GARFIELD

## 2017-11-02 NOTE — DISCHARGE SUMMARY
Transition of Care Discharge Summary - [facility] Medicine    Patient Information: Kimber Restrepo 81 y o  female MRN: [id number]  Unit/Bed#: [facility] 920-01 Encounter: [id number]    Discharging Physician / Practitioner: Emelia Adams MD  PCP: Chrissy Blancas MD  Admission Date: 10/27/2017  Discharge Date: 11/02/17    Disposition:      Acute Rehab Facility at [address] [place] [address]  to [facility] SNF:   · Not Applicable to this Patient - Not Applicable to this Patient    Reason for Admission: lower extremity weakness    Discharge Diagnoses:     Principal Problem:    Cervical cord compression with myelopathy [address] [address]  Active Problems:    [facility] )    Essential hypertension    Hypothyroidism  Resolved Problems:    * No resolved hospital problems  *      Consultations During Hospital Stay:  · Neurosurgery  · Hematology/oncology  · Radiation oncology    Procedures Performed:     · none    Medication Adjustments and Discharge Medications:  · Summary of Medication Adjustments made as a result of this hospitalization: started on decadron 4 mg q 6 hours & to be continued on same  · Medication Dosing Tapers - Please refer to Discharge Medication List for details on any medication dosing tapers (if applicable to patient)  · Medications being temporarily held (include recommended restart time):   · Discharge Medication List: See after visit summary for reconciled discharge medications  Wound Care Recommendations:  When applicable, please see wound care section of After Visit Summary      Diet Recommendations at Discharge:  Diet -        Diet Orders            Start     Ordered    10/30/17 1456  Dietary nutrition supplements  Once     Comments:  Allow pt to choose flavor   Question Answer Comment   Select Supplement: Ensure Enlive-Strawberry    Frequency Breakfast, Dinner        10/30/17 1456    10/27/17 1652  Diet Cardiac; Sodium 2 GM; Cardiac Step 1  Diet effective now     Question Answer Comment   Diet Type Cardiac    Cardiac Sodium 2 GM    Other Restriction(s): Cardiac Step 1    RD to adjust diet per protocol? Yes        10/27/17 6536      ·     Instructions for any Catheters / Lines Present at Discharge (including removal date, if applicable): none    Significant Findings / Test Results:     · MRI thoracic spine: 1  Suboptimal examination due to patient motion 2  Diffuse abnormal cord signal with mild expansion of the spinal cord extending from T7-T8 inferiorly to the conus medullaris  No abnormal enhancement of the spinal cord within this region  Although the findings may represent the sequela of prior radiation therapy (radiation myelitis) the overall extent appears somewhat greater in which is usually seen  Metastatic disease not excluded  3   Heterogeneously enhancing, expansile soft tissue mass involving the distal spinal cord/filum terminale  Findings most compatible with metastatic disease  4   Extradural enhancing soft tissue mass posterior to the L1 vertebral body  Findings most compatible with metastatic disease  5   Abnormal appearance of the T7 and T8 vertebral bodies, most compatible with the history of metastatic disease  · MRI cervical spine: 1  Suboptimal examination due to patient motion  2   Area of cord enhancement within the cervical spinal cord at the level of the C4-C5 interspace on the right  This is within the area of cord expansion and cortical signal abnormality seen on the precontrast MRI of October 25, 2017  Although the findings may be the sequela of central stenosis from progressive degenerative changes of the cervical spine or less likely prior radiation therapy (radiation myelitis), given the rapid onset of symptoms, metastatic disease not excluded  3   Degenerative changes of the cervical spine, similar to the recent MRI  · CT chest abdomen pelvis: 1    Bilateral soft tissue masses with intervening air bronchograms in the lungs bilaterally, largest in the left upper lobe  Differential includes multifocal pneumonia versus pulmonary neoplasm  These do not have a typical appearance for metastatic disease  2   Moderate-sized left pleural effusion with compressive atelectasis left lower lobe  3   Multiple hepatic lesions, suspicious for metastatic disease  4   Abnormal appearance of the left adrenal gland suspicious for metastatic disease  5   Multiple bony lesions, consistent with the known history of metastatic disease  · MRI lumbar spine: Large expansile intramedullary mass within the distal cord and conus measuring 3 cm in craniocaudad dimension with heterogeneous enhancement  Extensive edema extending superiorly into the lower thoracic cord above the scope of this exam   Smaller extra medullary mass on the left posterior lateral aspect of the conus, unchanged as well  See separate MRI of the thoracic spine dated 10/28/2017  No other enhancement identified within the lumbar canal  There is a focal marrow replacing lesion within the L1 vertebral body consistent with osseous metastasis  Lumbar degenerative disc disease with mild canal stenosis and mild to moderate foraminal narrowing described in detail above  · MRI brain: Left superior neck mass measuring 1 6 cm, best seen on series 5 image 4 suspicious for metastatic adenopathy  This is located within the superior aspect of the posterior triangle deep to the sternocleidomastoid muscle    Incidental Findings:   · As above     Test Results Pending at Discharge (will require follow up):   · none     Outpatient Tests Requested:  · none    Complications:  none    Hospital Course:     Marvel Maurice is a 81 y o  female patient who originally presented to the hospital on 10/27/2017 due to lower extremity weakness  pmhx of breast cancer with metastasis, HTN, presents as a direct admission from neurosurgery in the [facility] because patient was seen in the office and found to have a cervical spinal cord compression on MRI  Patient has a history of breast ca diagnosed in 2010  Patient has been getting followup for her cancer in [place] and now with [facility]  She also complains of being unable to walk for the past month and is now wheel chair bound  Patient admitted for further evaluation    · Cervical cord compression with myelopathy: likely related to metastatic disease  No immediate plans for surgical intervention at this time per Neurosurgery  Evaluated by Rad Onc to be started on palliative XRT for cervical spine in a day or so  · Mass at conus medullaris: Palliative XRT started from 10/31 for 10 sessions  Continue decadron 4 mg q 6 hour  · Functional quadriparesis - in the setting of above   She does have good muscle strength in lower extremities with worse right foot  · Metastatic Breast cancer (HCC) - CT of the chest abdomen and pelvis reveals moderate-sized left pleural effusion, multiple hepatic lesions and abnormal appearance of the left adrenal gland suspicious for metastatic disease  Also with bilateral soft tissue masses in the lungs   Patient with an overall poor prognosis  Jose Rojas will follow up with her medical oncologist at the [address] [place] [address] upon discharge  · Moderate left pleural effusion - likely malignant effusion   Patient's respiratory status is stable without immediate need for thoracentesis at this time    · Essential hypertension - cont amlodipine  · Hypothyroidism - continue levothyroxine  · Constipation - continue bowel regimen with lactulose increased to BID    Condition at Discharge: stable     Discharge Day Visit / Exam:     Subjective:  Feels fine, no CP/SOB, wants lidocaine patch for back pain, no abdo pain    Vitals: Blood Pressure: 111/70 (11/02/17 1500)  Pulse: 70 (11/02/17 1500)  Temperature: (!) 97 4 °F (36 3 °C) (11/02/17 1500)  Temp Source: Oral (11/02/17 1500)  Respirations: 18 (11/02/17 1500)  Height: 5' 2" (157 5 cm) (10/30/17 0900)  Weight - Scale: 65 9 kg (145 lb 4 5 oz) (10/30/17 0900)  SpO2: 98 % (11/02/17 1500)  Exam:   Physical Exam   Constitutional: She is oriented to person, place, and time  She appears well-developed and well-nourished  HENT:   Head: Normocephalic and atraumatic  Eyes: Conjunctivae are normal  No scleral icterus  Cardiovascular: Normal rate, regular rhythm and normal heart sounds  Exam reveals no gallop and no friction rub  No murmur heard  Pulmonary/Chest: Effort normal and breath sounds normal  No respiratory distress  She has no wheezes  Abdominal: Soft  She exhibits no distension  There is no tenderness  Musculoskeletal: She exhibits no edema  Neurological: She is alert and oriented to person, place, and time  Discussion with Family: family at bedisde    Goals of Care Discussions:  · Code Status at Discharge: Level 1 - Full Code  · Were there any Goals of Care Discussions during Hospitalization?: No  · Results of any General Goals of Care Discussions:    · POLST Completed: No   · If POLST Completed, Summary of POLST Agreement Provided Here:    · OK to Rehospitalize if Needed? Yes    Discharge instructions/Information to patient and family:   See after visit summary section titled Discharge Instructions for information provided to patient and family  Planned Readmission: no      Discharge Statement:  I spent 45 minutes discharging the patient  This time was spent on the day of discharge  I had direct contact with the patient on the day of discharge  Greater than 50% of the total time was spent examining patient, answering all patient questions, arranging and discussing plan of care with patient as well as directly providing post-discharge instructions  Additional time then spent on discharge activities      ** Please Note: This note has been constructed using a voice recognition system **

## 2017-11-02 NOTE — PROGRESS NOTES
11/02/17 [address] [facility] [address] Involvement Patient active with Confucianism   Spiritual Beliefs/Perceptions   Concept of God Accepting   Relationship with God Close   Spiritual Strengths wolfgang; prayer   Support Systems Friends/neighbors;Yarsanism/wolfgang community   Coping Responses   Patient Coping Accepting   Plan of Care   Comments Pt asked for prayer  She has her Confucianism family praying       Assessment Completed by: Unit visit

## 2017-11-02 NOTE — SOCIAL WORK
Cm reviewed patient during care coordination rounds  Patient is medically stable for d/c today  Cm confirmed that patient will transfer to [facility] to room 457 at 3:30pm  Cm informed patient's son about approval and transfer time

## 2017-11-02 NOTE — PHYSICAL THERAPY NOTE
Physical Therapy Progress Note     11/02/17 4420   Pain Assessment   Pain Assessment 0-10   Pain Score 5   Pain Type Acute pain   Pain Orientation Left   Hospital Pain Intervention(s) Repositioned   Response to Interventions Tolerated  Restrictions/Precautions   Other Precautions Chair Alarm; Bed Alarm;Cognitive; Fall Risk   Subjective   Subjective The pt  states that she is feeling alright  Bed Mobility   Supine to Sit 5  Supervision   Additional items Increased time required;Verbal cues   Sit to Supine 4  Minimal assistance   Additional items Assist x 1; Increased time required;Verbal cues;LE management   Transfers   Sit to Stand 3  Moderate assistance   Additional items Assist x 1; Increased time required;Verbal cues   Stand to Sit 3  Moderate assistance   Additional items Assist x 1; Increased time required;Verbal cues   Stand pivot 3  Moderate assistance   Additional items Assist x 1; Increased time required   Balance   Static Sitting Fair   Dynamic Sitting Fair -   Static Standing Poor   Ambulatory Poor   Activity Tolerance   Activity Tolerance Patient limited by fatigue   Nurse Karolina Avilez Asp, RN  Exercises   TKR Supine;Bilateral;AAROM;5 reps; Sitting  (x2 sets )   Assessment   Prognosis Fair   Problem List Decreased strength;Decreased endurance; Impaired balance;Decreased mobility; Decreased cognition; Impaired judgement;Decreased safety awareness;Pain   Assessment The pt  was fatigued this afternoon, and she was unable to ambulate any distance today  She was able to transfer several times in order to facilitate out of bed to the commode  She continues to remain significantly limited from her baseline, and she will require inpatient rehab at discharge  Barriers to Discharge Inaccessible home environment   Goals   Patient Goals To go to rehab  STG Expiration Date 11/13/17   Treatment Day 3   Plan   Treatment/Interventions Functional transfer training;LE strengthening/ROM; Therapeutic exercise; Endurance training;Patient/family training;Bed mobility;Gait training   Progress Progressing toward goals   PT Frequency 5x/wk   Recommendation   Recommendation Post acute IP rehab   Equipment Recommended Wheelchair;Walker     Angel Edouard, PTA

## 2017-11-02 NOTE — CASE MANAGEMENT
Continued Stay Review    Date: 10/31    Vital Signs: Temp (24hrs), Av 3 °F (36 8 °C), Min:97 9 °F (36 6 °C), Max:98 9 °F (37 2 °C)     HR:  [76-85] 85  Resp:  [17-18] 17  BP: (109-122)/(68-79) 109/68  SpO2:  [98 %-99 %] 99 %  Body mass index is 26 57 kg/m²  Medications:   Scheduled Meds:   acetaminophen 975 mg Oral Q8H [address]   amLODIPine 5 mg Oral Daily   dexamethasone 4 mg Oral Q6H EDWIN   heparin (porcine) 5,000 Units Subcutaneous Q8H [address]   lactulose 20 g Oral Daily   levothyroxine 100 mcg Oral Early Morning   menthol-zinc oxide  Topical TID   morphine 15 mg Oral BID   pregabalin 50 mg Oral TID   senna-docusate sodium 1 tablet Oral HS     Continuous Infusions:    PRN Meds: gadobutrol    HYDROmorphone    naloxone    ondansetron    oxyCODONE    polyethylene glycol    Abnormal Labs/Diagnostic Results:   No new    Age/Sex: 81 y o  female     Assessment/Plan:   Assessment:     Principal Problem:    Cervical cord compression with myelopathy (HCC)  Active Problems:    Breast cancer (HCC)    Essential hypertension    Hypothyroidism     Plan:     · Cervical cord compression with myelopathy:  ? likely related to metastatic disease  ? No immediate plans for surgical intervention at this time per Neurosurgery  ? evaluated by Rad Onc to be started on XRT for cervical spine in a day or so  · Mass at [facility]:  ? XRT from today for 10 sessions  ? Cont decadron 4q6  · Functional quadriparesis - in the setting of above   She does have good muscle strength in lower extremities  · Metastatic Breast cancer ([facility] [address] ) - CT of the chest abdomen and pelvis reveals moderate-sized left pleural effusion, multiple hepatic lesions and abnormal appearance of the left adrenal gland suspicious for metastatic disease  Also with bilateral soft tissue masses in the lungs   Patient with an overall poor prognosis  Ileana Grande will follow up with her medical oncologist at the [address] [place] [address] upon discharge    · Moderate left pleural effusion - likely malignant effusion   Patient's respiratory status is stable without immediate need for thoracentesis at this time  · Essential hypertension - cont amlodipine  · Hypothyroidism - continue levothyroxine  · Constipation - continue bowel regimen and add lactulose (patient was on this prior to admission)      VTE Pharmacologic Prophylaxis:   Pharmacologic: Heparin  Mechanical VTE Prophylaxis in Place:  Yes     Current Length of Stay: 4 day(s)     Current Patient Status: Inpatient   Certification Statement: The patient will continue to require additional inpatient hospital stay due to work on DC plan to facility     Discharge Plan: Awaiting SNF choices from family

## 2017-11-03 PROCEDURE — 97116 GAIT TRAINING THERAPY: CPT | Performed by: PHYSICAL THERAPIST

## 2017-11-03 PROCEDURE — 97112 NEUROMUSCULAR REEDUCATION: CPT | Performed by: PHYSICAL THERAPIST

## 2017-11-03 PROCEDURE — 97535 SELF CARE MNGMENT TRAINING: CPT

## 2017-11-03 PROCEDURE — 77387 GUIDANCE FOR RADJ TX DLVR: CPT | Performed by: RADIOLOGY

## 2017-11-03 PROCEDURE — 97110 THERAPEUTIC EXERCISES: CPT | Performed by: PHYSICAL THERAPIST

## 2017-11-03 PROCEDURE — 97163 PT EVAL HIGH COMPLEX 45 MIN: CPT | Performed by: PHYSICAL THERAPIST

## 2017-11-03 PROCEDURE — 97530 THERAPEUTIC ACTIVITIES: CPT | Performed by: PHYSICAL THERAPIST

## 2017-11-03 PROCEDURE — 77412 RADIATION TX DELIVERY LVL 3: CPT | Performed by: RADIOLOGY

## 2017-11-03 PROCEDURE — 97166 OT EVAL MOD COMPLEX 45 MIN: CPT

## 2017-11-03 RX ADMIN — HEPARIN SODIUM 5000 UNITS: 5000 INJECTION, SOLUTION INTRAVENOUS; SUBCUTANEOUS at 14:16

## 2017-11-03 RX ADMIN — ANORECTAL OINTMENT 1 APPLICATION: 15.7; .44; 24; 20.6 OINTMENT TOPICAL at 17:22

## 2017-11-03 RX ADMIN — MORPHINE SULFATE 15 MG: 15 TABLET, EXTENDED RELEASE ORAL at 17:22

## 2017-11-03 RX ADMIN — POLYETHYLENE GLYCOL 3350 17 G: 17 POWDER, FOR SOLUTION ORAL at 19:04

## 2017-11-03 RX ADMIN — OXYCODONE HYDROCHLORIDE 10 MG: 10 TABLET ORAL at 05:58

## 2017-11-03 RX ADMIN — DEXAMETHASONE 4 MG: 4 TABLET ORAL at 05:59

## 2017-11-03 RX ADMIN — ACETAMINOPHEN 975 MG: 325 TABLET, FILM COATED ORAL at 14:14

## 2017-11-03 RX ADMIN — PREGABALIN 50 MG: 50 CAPSULE ORAL at 17:22

## 2017-11-03 RX ADMIN — DEXAMETHASONE 4 MG: 4 TABLET ORAL at 12:41

## 2017-11-03 RX ADMIN — PREGABALIN 50 MG: 50 CAPSULE ORAL at 21:31

## 2017-11-03 RX ADMIN — PANTOPRAZOLE SODIUM 40 MG: 40 TABLET, DELAYED RELEASE ORAL at 05:59

## 2017-11-03 RX ADMIN — AMLODIPINE BESYLATE 5 MG: 5 TABLET ORAL at 09:46

## 2017-11-03 RX ADMIN — ACETAMINOPHEN 975 MG: 325 TABLET, FILM COATED ORAL at 21:31

## 2017-11-03 RX ADMIN — Medication 1 TABLET: at 21:31

## 2017-11-03 RX ADMIN — ANORECTAL OINTMENT 1 APPLICATION: 15.7; .44; 24; 20.6 OINTMENT TOPICAL at 09:40

## 2017-11-03 RX ADMIN — MORPHINE SULFATE 15 MG: 15 TABLET, EXTENDED RELEASE ORAL at 09:46

## 2017-11-03 RX ADMIN — ANORECTAL OINTMENT 1 APPLICATION: 15.7; .44; 24; 20.6 OINTMENT TOPICAL at 21:32

## 2017-11-03 RX ADMIN — HEPARIN SODIUM 5000 UNITS: 5000 INJECTION, SOLUTION INTRAVENOUS; SUBCUTANEOUS at 21:31

## 2017-11-03 RX ADMIN — LIDOCAINE 1 PATCH: 50 PATCH CUTANEOUS at 12:41

## 2017-11-03 RX ADMIN — LEVOTHYROXINE SODIUM 100 MCG: 100 TABLET ORAL at 05:58

## 2017-11-03 RX ADMIN — PREGABALIN 50 MG: 50 CAPSULE ORAL at 09:46

## 2017-11-03 RX ADMIN — LACTULOSE 20 G: 20 SOLUTION ORAL at 09:47

## 2017-11-03 RX ADMIN — ACETAMINOPHEN 975 MG: 325 TABLET, FILM COATED ORAL at 05:58

## 2017-11-03 RX ADMIN — DEXAMETHASONE 4 MG: 4 TABLET ORAL at 17:22

## 2017-11-03 RX ADMIN — HEPARIN SODIUM 5000 UNITS: 5000 INJECTION, SOLUTION INTRAVENOUS; SUBCUTANEOUS at 05:58

## 2017-11-03 NOTE — PROGRESS NOTES
PT Patriciaal     11/03/17 0900   Patient Data   Rehab Impairment non-traumatic spinal cord dysfunction   Etiologic Diagnosis cervical cord compression w/ myelopathy   Date of Onset 10/27/17   Home Setup   Type of Home Multi Level   Method of Entry Stairs   Number of Stairs 3  (BHR's but too wide to reach both )   Number of Stairs in Home (FF to 2nd floor)   In Home Hand Rail Right   First Floor Setup Available Yes   Home Modifications Necessary? No   Available Equipment Rollator   Prior Level of Function   Self-Care 2  Needed Some Help - Patient needed a partial assistance from another person to complete activities  Indoor-Mobility (Ambulation) 2  Needed Some Help - Patient needed a partial assistance from another person to complete activities  Stairs 2  Needed Some Help - Patient needed a partial assistance from another person to complete activities  Functional Cognition 3  Independent - Patient completed the activities by him/herself, with or without an assistive device, with no assistance from a helper  Prior Device Used D  Walker  (rollator)   Psychosocial   Psychosocial (WDL) WDL   Restrictions/Precautions   Precautions Bed/chair alarms; Fall Risk;Pressure Ulcer   Weight Bearing Restrictions No   ROM Restrictions No   Pain Assessment   Pain Assessment 0-10   Pain Score 6   Pain Type Acute pain   Pain Location Back; Shoulder   Pain Orientation [facility] Pain Intervention(s) Medication (See MAR); Repositioned; Rest   Response to Interventions pt reported pain sitting up in w/c; relief with laying supine    QI: Roll Left and Right   Assistance Needed Supervision; Adaptive equipment   Assistance Provided by [place] No physical assistance   Roll Left and Right CARE Score 4   QI: Sit to Lying   Assistance Needed Physical assistance;Supervision   Assistance Provided by [place] Less than 25%   Sit to Lying CARE Score 3   QI: Lying to Sitting on Side of Bed   Assistance Needed Physical assistance   Assistance Provided by [place] Less than 25%   Lying to Sitting on Side of Bed CARE Score 3   QI: Sit to Stand   Assistance Needed Physical assistance; Adaptive equipment   Assistance Provided by [place] 50%-74%   Sit to Stand CARE Score 2   QI: Chair/Bed-to-Chair Transfer   Assistance Needed Physical assistance; Adaptive equipment   Assistance Provided by [place] 50%-74%   Chair/Bed-to-Chair Transfer CARE Score 2   QI: Car Transfer   Reason if not Attempted Activity not applicable   Car Transfer CARE Score 9   Transfer Bed/Chair/Wheelchair   Positioning Concerns Skin Integrity   Limitations Noted In Balance; Coordination; Endurance;Pain Management; Sequencing;LE Strength   Adaptive Equipment Roller Walker   Stand Pivot Moderate Assist  (2nd person standby for safety )   Sit to Stand Moderate Assist   Stand to Sit Minimal   Supine to Sit Minimal   Sit to Supine Minimal;Supervision   Bed, Chair, Wheelchair Transfer (FIM) 1 - Patient requires assist of two people   QI: Walk 10 Feet   Reason if not Attempted Refused to perform   Walk 10 Feet CARE Score 7   QI: Walk 50 Feet with Two Turns   Reason if not Attempted Refused to perform   Walk 50 Feet with Two Turns CARE Score 7   QI: Walk 150 Feet   Reason if not Attempted Refused to perform   Walk 150 Feet CARE Score 7   QI: Walking 10 Feet on Uneven Surfaces   Reason if not Attempted Activity not applicable   Walking 10 Feet on Uneven Surfaces CARE Score 9   Ambulation   Findings to be assessed in PM Session    QI: 1 Step (Curb)   Reason if not Attempted Safety concerns   1 Step (Curb) CARE Score 88   QI: 4 Steps   Reason if not Attempted Safety concerns   4 Steps CARE Score 88   QI: 12 Steps   Reason if not Attempted Safety concerns   12 Steps CARE Score 88   Stairs   Findings to be assessed when appropriate   Comprehension   QI: Comprehension 4   Undestands: Clear comprehension without cues or repetitions   Comprehension (FIM) 6 - Understands complex/abstract but requires more time Expression   QI: Expression 4  Express complex messages without difficulty and with speech that is clear and easy to [place]   Expression (FIM) 6 - Expresses complex/abstract but requires:  more time   Social Interaction   Social Interaction (FIM) 6 - Interacts appropriately with others BUT requires extra  time   Problem Solving   Problem solving (FIM) 5 - Solves basic problems 90% of time   Memory   Memory (FIM) 5 - Needs cueing reminders <10%   Strength RLE   R Hip Flexion 2+/5   R Knee Flexion 3-/5   R Knee Extension 3+/5   R Ankle Dorsiflexion 3-/5   R Ankle Plantar Flexion 3+/5   Strength LLE   L Hip Flexion 2+/5   L Knee Flexion 3-/5   L Knee Extension 4-/5   L Ankle Dorsiflexion 3/5   L Ankle Plantar Flexion 3+/5   Coordination   Movements are Fluid and Coordinated 0   Coordination and Movement Description mild dysmetria with finger to nose; ataxic RLE > LLE   Sensation   Light Touch No apparent deficits   Cognition   Arousal/Participation Alert; Cooperative   Orientation Level Oriented X4   Objective Measure   PT Findings bridging 10x; hooklying ball squeezes 15x; SAQ 10x each    Discharge Information   Patient's Discharge Plan home with family support   Patient's Rehab Expectations to get stronger and go home   Barriers to Discharge Home Decreased Strength;Decreased Endurance;Pain; Safety Considerations   Impressions Pt is 82 y/o female admitted with cervical cord compression w/ myelopathy  Over the past 2 weeks pt reports she became progressively weaker to the point where her son was carrying her around for mobility; prior to this pt reports she walked short distances in the house with her rollator  Pt's son is home 24/7 to provide assistance  Pt presents with pain, dec endurance, dec LE strength, dec coordination (RLE>LLE), L hand tremors, dec balance; overall decline in functional mobility  Stairs are also barrier to D/C   Pt demo G rehab potential to meet mod I sit pivot and w/c level goals; may require A for standing and walking  Pt will benefit from skilled PT to address above impairments and maximize safety with all functional mobility      PT Therapy Minutes   PT Time In 0900   PT Time Out 0930   PT Total Time (minutes) 30   PT Mode of treatment - Individual (minutes) 30   PT Mode of treatment - Concurrent (minutes) 0   PT Mode of treatment - Group (minutes) 0   PT Mode of treatment - Co-treat (minutes) 0   PT Mode of Teatment - Total time(minutes) 30 minutes

## 2017-11-03 NOTE — PROGRESS NOTES
Physical Medicine and Rehabilitation Progress Note:  Spinal Cord Dysfunction:  Non-Traumatic:  04 130 Other Non-Traumatic Cord Compression  Osito Womack 81 y o  female MRN: [id number]  Unit/Bed#: -01 Encounter: [id number]    Assessment:   Kenny Jeans is a 81 y o  female who  has a past medical history of Arthritis; Asthma; Breast cancer ([address] ); Disease of thyroid gland; and Hypertension  and presented to the [address] after outpatient MRI demonstrated cord compression  Palliative treatment to spine started  Surgical intervention not recommended due to extent of metastasis  She was accepted to [facility] on 11/03/17  Subjective: No acute events overnight  Kali [place] was seen while in her room  Denies any complaints today  No CP or SOB  Pain is controlled    ROS: A 10-point ROS was performed  Negative except as listed above  Restrictions include:  Fall precautions    Disposition: TBD    Plan:  # Cord compression, myelopathy  - Acute comprehensive interdisciplinary inpatient rehabilitation including PT, OT, SLP, RN, CM, SW, dietary, psychology, etc   - Appreciate Internal Medicine following - Dr Lambert Oseguera service      - secondary to spinal mets  - undergoing XRT (total of 10 sessions)  - Continue steroids     # Metastatic breast CA  - f/u with oncology as outpatient  - noted to have edema to LUE, will require compression garment     # Neuropathy  - continue lyrica     # Anemia (improved)  - Likely reactive  - continue monitoring CBC       Results from last 7 days  Lab Units 10/31/17  0508 10/28/17  0546 10/27/17  2016   HEMOGLOBIN g/dL 12 3 11 8 11 2*       # HTN  - Continue Amlodipine     Temp:  [97 6 °F (36 4 °C)-98 4 °F (36 9 °C)] 98 4 °F (36 9 °C)  HR:  [74-78] 74  Resp:  [18] 18  BP: (105-116)/(62-71) 116/70    # Hypothyroid  - Continue levothyroxine      # Pain  - Continue tylenol PRN, for max of 3gm daily     - Continue oxycodone   - Continue MS Contin  - This patient record was searched in the [place] PDMP and the risks/benefits of prescribing this patient medications with high abuse/dependence potential was assessed and determined to be medically appropriate at this time  I could not find any prescriptions in [place], [place]  Patient reports she was getting oxycodone 10mg TID (chart review notes she was taking 10mg Q6h PRN), and MS contin 15mg BID      # Rehab Psych  - Continue alprazolam PRN  - Neuropsych consult, appreciate recs     # FENA/prophy  - Diet: cardiac  SLP and nutrition to monitor and adjust as necessary   - DVT prophy: Sequential compression device (Venodyne)  and Heparin  - GI ppx: Pantaprazole  - Bowel: colace , dulcolax suppository  and miralax PRN  - Nausea: Zofran  - Supplements: None  - Sleep: None      CODE: Level 1: Full Code    Objective:  Nursing staff reporting: no problems    Functional Update:  Physical Therapy Occupational Therapy Speech Therapy                       Allergies and Medications per EMR    Physical Exam:  General: alert, no apparent distress, cooperative and comfortable  HENMT: Head: Normal, normocephalic, atraumatic  Eye: Normal external eye, conjunctiva, lids   Ears: Normal external ears    Nose: Normal external nose, mucus membranes  Pulmonary: chest expansion normal, no retractions, no accessory muscle usage  Abdomen: soft, nontender, nondistended, no masses or organomegaly  Skin/Extremity: no rashes, no erythema, LUE edema   Neurologic: Awake alert orientedx3  Psych: normal mood, behavior, speech, dress, and thought processes    Diagnostic Studies: reviewed, no new imaging    Vitals:  Temp:  [97 6 °F (36 4 °C)-98 4 °F (36 9 °C)] 98 4 °F (36 9 °C)  HR:  [74-78] 74  Resp:  [18] 18  BP: (105-116)/(62-71) 116/70   Intake/Output Summary (Last 24 hours) at 11/03/17 1510  Last data filed at 11/03/17 1300   Gross per 24 hour   Intake              680 ml   Output              675 ml   Net                5 ml        Laboratory:    Lab Results Component Value Date    HGB 12 3 10/31/2017    HCT 37 1 10/31/2017    WBC 5 98 10/31/2017     Lab Results   Component Value Date    BUN 12 10/31/2017     10/31/2017    K 4 1 10/31/2017     10/31/2017    GLUCOSE 101 10/31/2017    CREATININE 0 49 (L) 10/31/2017     No results found for: PROTIME, INR     Patient Active Problem List   Diagnosis    Cervical cord compression with myelopathy ([facility] [address] )    Breast cancer ([facility] )    Essential hypertension    Hypothyroidism       ** Please Note: Fluency Direct voice to text software may have been used in the creation of this document  **    [ x ] Total time spent: 30 Mins, and greater than 50% of this time was spent counseling/coordinating care

## 2017-11-03 NOTE — PLAN OF CARE
Individualized Plan of Marcelino Womack 81 y o  female MRN: [id number]  Unit/Bed#: -01 Encounter: [id number]     PATIENT INFORMATION  ADMISSION DATE: 11/2/2017  4:59 PM LUANA CATEGORY:Spinal Cord Dysfunction:  Non-Traumatic:  04 130 Other Non-Traumatic Cord Compression   ADMISSION DIAGNOSIS: Cervical cord compression with myelopathy (HCC) [G95 20]  EXPECTED LOS: 2-3 weeks     MEDICAL/FUNCTIONAL PROGNOSIS  Based on my assessment of the patient's medical conditions and current functional status, the prognosis for attaining medical and functional goals or the IRF stay is:  Good    Medical Goals: Patient will be medically stable for discharge to [facility] upon completion of rehab program    [address]: Home - with supervision  Is a 24-hr caregiver available? Yes  Has discharge plan been discussed with primary caregiver? Yes  Date of Discussion: Admission    ANTICIPATED FOLLOW-UP SERVICE:   Outpatient Therapy Services: PT and OT      Home Health Services: PT, OT and Nursing    DISCIPLINE SPECIFIC PLANS:  Required Disciplines & Services: Rehabillitation Nursing, Case Management, Dietay/Nutrition, Prosthetics/Orthostics and Psychology    REQUIRED THERAPY:  Therapy Hours per Day Days per Week Total Days   Physical Therapy 1 5 5 5   Occupational Therapy 1 5 5 5   NOTE: Additional therapy time(s) may be added as appropriate to meet patient needs and to achieve functional goals      Patient will either participate in above therapy regimen or participate in 900 minutes of therapy within 7 day week consisting of PT and OT due to the following medical procedure/condition:Spinal Cord Dysfunction:  Non-Traumatic:  04 130 Other Non-Traumatic Cord Compression    ANTICIPATED FUNCTIONAL OUTCOMES:  ADL: Patient will require assist with ADLs with least restrictive device upon completion of rehab program Bladder/Bowel: Patient will return to premorbid level for bladder/bowel management upon completion of rehab program   Transfers: Patient will be independent with transfers with least restrictive device upon completion of rehab program   Locomotion: Patient will be independent with locomotion with least restrictive device upon completion of rehab program   Cognitive: Patient will be independent for basic and complex tasks upon completion of rehab program     DISCHARGE PLANNING NEEDS  Equipment needs: Discharge needs to be reviewed with team    REHAB ANTICIPATED PARTICIPATION RESTRICTIONS:  - weakness

## 2017-11-03 NOTE — H&P
PHYSICAL MEDICINE AND REHABILITATION H&P/ADMISSION NOTE  David Womack 81 y o  female MRN: [id number]  Unit/Bed#: -01 Encounter: [id number]     Rehab Diagnosis: Spinal Cord Dysfunction:  Non-Traumatic:  04 130 Other Non-Traumatic Cord compression, myelopathy    History of Present Illness:   Marilyn Castro is a 81 y o  female who  has a past medical history of Arthritis; Asthma; Breast cancer ([address] ); Disease of thyroid gland; and Hypertension  and [facility] outpatient MRI demonstrated cord compression  MRI demonstrated expansion of spinal cord from T7-T8 to conus, as well as extradural mass posterior to L1 vertebral body; T7 and T8 vertebral bodies were also noted to have abnormal appearance  CT chest demonstrated mild compression fractures of T7 and T8, bilateral soft tissue masses larger in PATRIC, a moderate-sized left pleural effusion, multiple hepatic lesions which were considered suspicious for metastatic disease, left adrenal gland abnormality suspicious metastatic disease, and multiple bony lesions, again suspicious for metastatic disease  Repeat MRI of C-spine demonstrated cord enhancement at level of C4-C5on right; metastatic disease could not be excluded  MRI brain was negative for intracranial metastasis but did demonstrate left superior neck mass measuring 1 6cm  Patient reports getting treatment at [facility], with Dr Nicholas Rice and Dr Yeny Rubin as her physicians  She was also seeing oncologist in [place]  Oncology service was consulted, with prognosis as guarded  Dexamethasone was increased  Palliative treatment to spine started  Surgical intervention not recommended due to extent of metastasis  She was accepted to [facility] on 11/03/17  Patient seen and examined at bedside  Patient reports her function has declined significantly over the past several weeks, and has been transferring by being carried around by her son   She has moved down to first floor setup  She endorses having chronic right foot drop (although on examination noted to have at least 4/5 dorsiflexion strength)  She endorses bilateral lower extremity numbness in bilateral feet extending up to ankles  Occasional numbness/tingling to fingers  NO CP or SOB  Feels well otherwise, without pain  Excited to be starting rehabilitation  Restrictions include:  none Fall precautions     Hospital Course/comorbidities:    Comorbidities: As above  Complications: As above     Last Weight:    Wt Readings from Last 1 Encounters:   11/02/17 68 kg (149 lb 14 4 oz)     Last BMI:  Estimated body mass index is 27 42 kg/m² as calculated from the following:    Height as of this encounter: 5' 2" (1 575 m)  Weight as of this encounter: 68 kg (149 lb 14 4 oz)      Changes Since Pre-admission Assessment: None -This patient's participation in rehab continues to be reasonable, necessary and appropriate      Functional History:  Prior to Admission:      - was requring transfers by being carried by son  On first floor  Formerly independent with mobility and ADLs before her decline     Present:  PT:re-eval pending  OT:re-eval pending  SLP:re-eval pending    Past Medical History:   Past Surgical History:   Family History:     Past Medical History:   Diagnosis Date    Arthritis     Asthma     Breast cancer [address] [address] )     Disease of thyroid gland     Hypertension     Past Surgical History:   Procedure Laterality Date    APPENDECTOMY      BREAST SURGERY      HYSTERECTOMY       Family History   Problem Relation Age of Onset    Hypertension Mother     Diabetes Mother           Medications:    Current Facility-Administered Medications:     acetaminophen (TYLENOL) tablet 975 mg, 975 mg, Oral, Q8H [address], Dallas Arce MD, 975 mg at 11/03/17 0558    ALPRAZolam (XANAX) tablet 0 25 mg, 0 25 mg, Oral, BID PRN, Dallas Arce MD, 0 25 mg at 11/02/17 1777    amLODIPine (NORVASC) tablet 5 mg, 5 mg, Oral, Daily, Bruno Abbott MD, 5 mg at 11/03/17 0946    bisacodyl (DULCOLAX) rectal suppository 10 mg, 10 mg, Rectal, Daily PRN, Bruno Abbott MD    dexamethasone (DECADRON) tablet 4 mg, 4 mg, Oral, Q6H [facility], Dallas Arce MD, 4 mg at 11/03/17 0559    heparin (porcine) subcutaneous injection 5,000 Units, 5,000 Units, Subcutaneous, Q8H [facility], Tomasa Smiley PA-C, 5,000 Units at 11/03/17 0558    lactulose 20 g/30 mL oral solution 20 g, 20 g, Oral, BID, Dallas Arce MD, 20 g at 11/03/17 0947    levothyroxine tablet 100 mcg, 100 mcg, Oral, Early Morning, Dallas Arce MD, 100 mcg at 11/03/17 0558    lidocaine (LIDODERM) 5 % patch 1 patch, 1 patch, Transdermal, Daily, Dallas Arce MD    menthol-zinc oxide (CALMOSEPTINE) 0 44-20 6 % ointment 1 application, 1 application, Topical, TID, Dallas Arce MD, 1 application at 29/05/23 0940    morphine (MS CONTIN) ER tablet 15 mg, 15 mg, Oral, BID, Dallas Arce MD, 15 mg at 11/03/17 0946    ondansetron (ZOFRAN-ODT) dispersible tablet 4 mg, 4 mg, Oral, Q6H PRN, Bruno Abbott MD, 4 mg at 11/02/17 2125    oxyCODONE (ROXICODONE) immediate release tablet 10 mg, 10 mg, Oral, Q8H PRN, Dallas Arce MD, 10 mg at 11/03/17 0558    pantoprazole (PROTONIX) EC tablet 40 mg, 40 mg, Oral, Early Morning, Tomasa Smiley PA-C, 40 mg at 11/03/17 0559    polyethylene glycol (MIRALAX) packet 17 g, 17 g, Oral, Daily PRN, Dallas Arce MD    pregabalin (LYRICA) capsule 50 mg, 50 mg, Oral, TID, Dallas Arce MD, 50 mg at 11/03/17 0946    senna-docusate sodium (SENOKOT S) 8 6-50 mg per tablet 1 tablet, 1 tablet, Oral, HS, Bruno Abbott MD, 1 tablet at 11/02/17 4820    Allergies: Allergies   Allergen Reactions    Gabapentin GI Intolerance    Hydrocodone GI Intolerance    Ibuprofen Rash      Social History:    Social History     Social History    Marital status:       Spouse name: N/A    Number of children: N/A    Years of education: N/A Social History Main Topics    Smoking status: Never Smoker    Smokeless tobacco: Not on file    Alcohol use No    Drug use: No    Sexual activity: Not Currently     Other Topics Concern    Not on file     Social History Narrative    No narrative on file         Kem Older is  and lives with their family  She lives in [facility]) single family home  The living area: can live on one level  Equipment in home: [address] and Manual Wheelchair  There 3 steps to enter the home  Patient/family's goals: Return to previous home/apartment  The patient will have 24 hour supervision/physical assistance available upon discharge      Review of Systems: A 10-point review of systems was performed  Negative except as listed above  Physical Exam:    Temp:  [97 4 °F (36 3 °C)-98 4 °F (36 9 °C)] 98 4 °F (36 9 °C)  HR:  [70-78] 74  Resp:  [18] 18  BP: (105-116)/(62-71) 116/70   Intake/Output Summary (Last 24 hours) at 11/03/17 1109  Last data filed at 11/03/17 0900   Gross per 24 hour   Intake              540 ml   Output              675 ml   Net             -135 ml    Estimated body mass index is 27 42 kg/m² as calculated from the following:    Height as of this encounter: 5' 2" (1 575 m)  Weight as of this encounter: 68 kg (149 lb 14 4 oz)       General: alert, no apparent distress, cooperative and comfortable, very pleasant  Head: Normal, normocephalic, atraumatic  Eye: Normal external eye, conjunctiva, lids   Ears: Normal external ears  Nose: Normal external nose, mucus membranes    Pulmonary: no retractions, no abnormal respiratory pattern, chest expansion normal  Abdomen: soft, nontender, nondistended, no masses or organomegaly  Skin/Extremity: no rashes, no erythema, LUE edematous (patient states this was due to axillary node resection in past)  Neurologic: normal without focal findings, mental status, speech normal, alert and oriented x3, CHRIS and reflexes normal and symmetric decreased to LT in bilateral feet  Psych: Appropriate affect, alert and oriented to person, place and time   Musculoskeletal - Strength:   Right  Left  Site  Right  Left  Site    5 4 S Ab: Shoulder Abductors  5  5  HF: Hip Flexors    5 5  EF: Elbow Flexors  5  5 KF: Knee Flexors    5  5  EE: Elbow Extensors  5  5  KE: Knee Extensors    5  5  WE: Wrist Extensors  4 5  DR: Dorsi Flexors    5  5  FF: Finger Flexors  3  5  PF: Plantar Flexors    5  5  HI: Hand Intrinsics  4 5  EHL: Extensor Hallucis Longus   *Odd presentation of right plantar flexion weakness greater than DF  External and internal foot rotation 5/5 strength  Laboratory:      Lab Results   Component Value Date    HGB 12 3 10/31/2017    HCT 37 1 10/31/2017    WBC 5 98 10/31/2017     Lab Results   Component Value Date    BUN 12 10/31/2017     10/31/2017    K 4 1 10/31/2017     10/31/2017    GLUCOSE 101 10/31/2017    CREATININE 0 49 (L) 10/31/2017     No results found for: PROTIME, INR     Imaging: reviewed     Assessment and Plan:     Rehabilitation Diagnoses:   Spinal Cord Dysfunction:  Non-Traumatic:  04 130 Other Non-Traumatic cord compression, myelopathy  Medical Diagnoses:   Patient Active Problem List   Diagnosis    Cervical cord compression with myelopathy ([address] )    Breast cancer ([address] )    Essential hypertension    Hypothyroidism     Associated Injuries: none  Hospital Complications/comorbidities: As listed in HPI     Rehabilitation Prognosis: good     Tolerance for three hours of therapy a day: good      1    Current Medical Problems/Risks/Management:    # Cord compression, myelopathy  - Acute comprehensive interdisciplinary inpatient rehabilitation including PT, OT, SLP, RN, CM, SW, dietary, psychology, etc   - Appreciate Internal Medicine following - Dr Karma Samaniego service     - secondary to spinal mets  - undergoing XRT (total of 10 sessions)  - Continue steroids    # Metastatic breast CA  - f/u with oncology as outpatient  - noted to have edema to LUE, will require compression garment    # Neuropathy  - continue lyrica    # Anemia (improved)  - Likely reactive  - continue monitoring CBC      Results from last 7 days  Lab Units 10/31/17  0508 10/28/17  0546 10/27/17  2016   HEMOGLOBIN g/dL 12 3 11 8 11 2*     # HTN  - Continue Amlodipine    Temp:  [97 4 °F (36 3 °C)-98 4 °F (36 9 °C)] 98 4 °F (36 9 °C)  HR:  [70-78] 74  Resp:  [18] 18  BP: (105-116)/(62-71) 116/70    # Hypothyroid  - Continue levothyroxine       # Pain  - Continue tylenol PRN, for max of 3gm daily  - Continue oxycodone   - Continue MS Contin  - This patient record was searched in the [place] PDMP and the risks/benefits of prescribing this patient medications with high abuse/dependence potential was assessed and determined to be medically appropriate at this time  I could not find any prescriptions in [place], [place]  Patient reports she was getting oxycodone 10mg TID (chart review notes she was taking 10mg Q6h PRN), and MS contin 15mg BID  # Rehab Psych  - Continue alprazolam PRN  - Neuropsych consult, appreciate recs    # FENA/prophy  - Diet: cardiac  SLP and nutrition to monitor and adjust as necessary   - DVT prophy: Sequential compression device (Venodyne)  and Heparin  - GI ppx: Pantaprazole  - Bowel: colace , dulcolax suppository  and miralax PRN  - Nausea: Zofran  - Supplements: None  - Sleep: None     CODE: Level 1: Full Code    2  Bladder and Bowel Problems/Risks/Management:  Monitor bowel/bladder function  3  Nutrition Problems/Risks/Managment:  Appetite: fair  Current Diet: cardiac  4  Psychological/social/spiritual/vocational  referral to Rehabilitation Psychology  5  Family/Patient Goals:  Patient/family's goals: Return to previous home/apartment  Patient will receive PT and  OT 90 minutes each per day, five days per week to achieve rehab goals     6   Mobility Goals:   Bed Mobility: Supervision  Bed to wheelchair transfer: Supervision  Sit to stand: Supervision  Ambulation: Supervision  Stairs: Supervision  Manual wheelchair: Supervision  7  Activities of Daily Living (ADLs) Goals:  Eating: Supervision  Hygiene and Grooming: Supervision  Bathing: Supervision  Upper Extremity Dressing: Supervision  Diet / Swallowing: Supervision  Lower Extremity Dressing: Supervision  Tub/shower Transfers: Supervision  Toilet Transfers: Supervision  Toileting / Hygiene:  Supervision  8  Cognition / Communication:  Cognition grossly intact  Speech intact  9   Discharge Planning:  Rehabilitation and discharge goals discussed with the patient and/or family  Case Managment and Social Work to review patient/family resources and to coordinate Discharge Planning  Estimated length of stay: 2 - 3 weeks    Patient and Family Education and Training:  Rehabilitation and discharge goals discussed with the patient and/or family  Patient/family education/training needs to be discussed in weekly team meeting  Other equipment:  May need a wheelchair evaluation  CMS Required Post-Admission Physician Evaluation Elements  History and Physical, including medical history, functional history and active comorbidities as in above text      PostAdmission Physician Evaluation:  The patient has the potential to make improvement and is in need of physical And, occupational Therapy services  The patient may also need nutritional services  Given the patient's complex medical condition and risk of further medical complications, rehabilitative services cannot be safely provided at a lower level of care, such as a skilled nursing facility  I have reviewed the patient's functional and medical status at the time of the preadmission screening and they are the same as on the day of this admission  I acknowledge that I have personally performed a full physical examination on this patient within 24 hours of admission   The patient demonstrated understanding the rehabilitation program and the discharge process after we discussed them      Agree in entirety: yes  Minor adaptions: none    Major changes: none     Marck Traylor MD MPH  Physical Medicine and Rehabilitation    ** Please Note: Fluency Direct voice to text software may have been used in the creation of this document   **

## 2017-11-03 NOTE — SOCIAL WORK
Met with pt and reviewed rehab routine and cm role  Pt resides with her son who she states does not work and is able to assist  Pt did not have any prior experience with hhc or outpt therapy but was using a walker just prior to coming into the hospital  Informed pt of potential los and team mtg process  Informed her of potential therapy needs on dc and options of hhc vs outpt therapy  Pt has an rx plan through [facility] she believes and uses [facility]  Pt has been made aware of [facility]  Cm following to assist w/dc planning needs

## 2017-11-03 NOTE — PROGRESS NOTES
Internal Medicine Progress Note  Patient: Karen Handy  Age/sex: 81 y o  female  Medical Record #: [id number]      ASSESSMENT/PLAN:  Karen Handy is seen and examined and mangement for following issues:    1  Cervical cord compression with myelopathy/Mass at Conus [place]: Likely related to metastatic disease  Currently undergoing XRT for 10 sessions to lumbar and cervical spine  Continue Decadron 4mg Q6 hours  Pain control  Therapy per primary team    2  Metastatic breast cancer: patient with moderate left pleural effusion, multiple hepatic lesions, possible adrenal mass and multiple bony lesions  Overall prognosis poor  Outpatient follow up with primary oncologist  Previously on Xeloda  3  Left pleural effusion: likely malignant  Monitor respiratory status  Currently denies complaints of SOB  4  Hypertension: continue norvasc and monitor blood pressure  5  Hypothyroidism: continue levothyroxine  6  Peripheral neuropathy: continue Lyrica  7  DVT prophylaxis: SQ heparin               Subjective: Patient seen and examined  Pt reports she is doing well  Pain is adequately controlled  She denies nausea, chest pain or SOB  She slept well last night  She denies any other complaints      ROS:   GI: denies abdominal pain, change bowel habits or reflux symptoms  Neuro: No new neurologic changes  Respiratory: No Cough, SOB  Cardiovascular: No CP, palpitations     Scheduled Meds:    acetaminophen 975 mg Oral Q8H [address]   amLODIPine 5 mg Oral Daily   dexamethasone 4 mg Oral Q6H [facility]   heparin (porcine) 5,000 Units Subcutaneous Q8H [address]   lactulose 20 g Oral BID   levothyroxine 100 mcg Oral Early Morning   lidocaine 1 patch Transdermal Daily   menthol-zinc oxide 1 application Topical TID   morphine 15 mg Oral BID   pantoprazole 40 mg Oral Early Morning   pregabalin 50 mg Oral TID   senna-docusate sodium 1 tablet Oral HS       Labs:       Results from last 7 days  Lab Units 10/31/17  0508 10/28/17  0546   WBC Thousand/uL 5 98 3 11*   HEMOGLOBIN g/dL 12 3 11 8   HEMATOCRIT % 37 1 36 7   PLATELETS Thousands/uL 289 265       Results from last 7 days  Lab Units 10/31/17  0508 10/29/17  0457   SODIUM mmol/L 139 139   POTASSIUM mmol/L 4 1 3 8   CHLORIDE mmol/L 106 107   CO2 mmol/L 26 25   BUN mg/dL 12 11   CREATININE mg/dL 0 49* 0 58*   GLUCOSE RANDOM mg/dL 101 120   CALCIUM mg/dL 8 7 8 7                Glucose (mg/dL)   Date Value   10/31/2017 101   10/29/2017 120   10/28/2017 85   10/27/2017 91       Labs reviewed    Physical Examination:  Vitals:   Vitals:    11/02/17 1733 11/03/17 0111   BP: 109/62 105/71   Pulse: 78 74   Resp: 18 18   Temp: 97 6 °F (36 4 °C) 98 4 °F (36 9 °C)   TempSrc: Oral Oral   SpO2: 100% 97%   Weight: 68 kg (149 lb 14 4 oz)    Height: 5' 2" (1 575 m)        GEN: NAD  HEENT: NC/AT, EOMI  RESP: CTAB, no R/R/W  CV: +S1 S2, regular rate, no rubs  ABD: soft, NT, ND, normal BS   : No Singh  EXT: No edema  Skin: No rashes  Neuro: AAOx3  Bilat pill rolling tremor  [ X ] Total time spent: 30 Mins and greater than 50% of this time was spent counseling/coordinating care        Tiffany Wallace PA-C  Internal Medicine

## 2017-11-03 NOTE — PROGRESS NOTES
11/03/17 1330   Pain Assessment   Pain Assessment No/denies pain   Pain Score No Pain   Restrictions/Precautions   Precautions Bed/chair alarms; Fall Risk;Pressure Ulcer   Cognition   Arousal/Participation Cooperative   Subjective   Subjective Pt reports she feels ok and is ready for therapy   QI: Sit to Lying   Assistance Needed Physical assistance   Assistance Provided by [place] Less than 25%   Sit to Lying CARE Score 3   QI: Lying to Sitting on Side of Bed   Assistance Needed Physical assistance   Assistance Provided by [place] Less than 25%   Lying to Sitting on Side of Bed CARE Score 3   QI: Sit to Stand   Assistance Needed Physical assistance   Assistance Provided by [place] 50%-74%   Sit to Stand CARE Score 2   QI: Chair/Bed-to-Chair Transfer   Assistance Needed Physical assistance   Assistance Provided by [place] 50%-74%   Chair/Bed-to-Chair Transfer CARE Score 2   Transfer Bed/Chair/Wheelchair   Limitations Noted In Balance; Coordination; Endurance; Sequencing;UE Strength;LE Strength   Adaptive Equipment Roller Walker   Sit Pivot Moderate Assist   Stand Pivot Moderate Assist;Maximum Assist   Sit to Stand Minimal Assist;Moderate Assist   Stand to Sit Minimal Assist;Moderate Assist   Supine to Sit Minimal Assist   Sit to Supine Minimal Assist   Bed, Chair, Wheelchair Transfer (FIM) 2 - [place] needs to lift or boost to rise AND assist to sit   QI: Walk 10 Feet   Assistance Needed Physical assistance; Adaptive equipment   Assistance Provided by [place] 75% or more   Comment parallel bars   Walk 10 Feet CARE Score 2   Ambulation   Does the patient walk? 2  Yes   Primary Discharge Mode of Locomotion Wheelchair;Walk   Walk Assist Level Maximum Assist   Gait Pattern Inconsistant Barb; Slow Barb;Decreased foot clearance; Forward Flexion;R knee lolis;L knee lolis;Narrow JAE;Step to; Improper weight shift   Assist Device Parallel Bars   Distance Walked (feet) 10 ft   Limitations Noted In Balance; Coordination; Endurance; Heel Strike;Posture; Sequencing;Speed;Strength;Swing   Walking (FIM) 1 - Patient ambulates less than 49 feet regardless of assist/device/set up   Therapeutic Interventions   Neuromuscular Re-Education standing in parallel bars stepping foot forward to reach a target while blocking opposite knee    Equipment Use   NuStep x 6 min  level 1 with tc's to maintain R knee in midline    Assessment   Treatment Assessment Pt participated in skilled PT session with focus on gait training and LE strengthening  Pt presents with buckling B/L LE's (R > L); focused on gait training in parallel bars with therapist blocking opposite knee as pt advances other LE  Pt has difficulty with foot placement when taking steps; focused on coordination exercise stepping foot forward to hit a target  Also practiced sit>stands from w/c and pt progressed to a min A level (blocking R knee)  Pt limited by dec activity tolerance and dec LE strength/coordination  Recommend sit pivot transfers @ this time; will cont to practice standing and SPT in therapy  Also recommend w/c mobility to be assessed; unable to assess this session as pt became too fatigued @ the end and wanted to go back to bed  Pt will cont to benefit from skilled PT to further progress overall functional mobility  Family/Caregiver Present yes son   Barriers to Discharge Inaccessible home environment   PT Barriers   Physical Impairment Decreased strength;Decreased endurance; Impaired balance;Decreased mobility; Decreased coordination;Decreased safety awareness;Decreased skin integrity   Functional Limitation Car transfers; Ramp negotiation;Stair negotiation;Standing;Transfers; Walking; Wheelchair management   Plan   Treatment/Interventions Functional transfer training;LE strengthening/ROM; Therapeutic exercise; Endurance training;Patient/family training;Equipment eval/education; Bed mobility;Gait training   Progress Progressing toward goals   Recommendation Recommendation Home with family support;Home PT   Equipment Recommended Wheelchair;Walker   PT Therapy Minutes   PT Time In 1330   PT Time Out 1430   PT Total Time (minutes) 60   PT Mode of treatment - Individual (minutes) 60   PT Mode of treatment - Concurrent (minutes) 0   PT Mode of treatment - Group (minutes) 0   PT Mode of treatment - Co-treat (minutes) 0   PT Mode of Teatment - Total time(minutes) 60 minutes   Therapy Time missed   Time missed?  No

## 2017-11-03 NOTE — PLAN OF CARE
Problem: Potential for Falls  Goal: Patient will remain free of falls  INTERVENTIONS:  - Assess patient frequently for physical needs  -  Identify cognitive and physical deficits and behaviors that affect risk of falls  -  [place] fall precautions as indicated by assessment   - Educate patient/family on patient safety including physical limitations  - Instruct patient to call for assistance with activity based on assessment  - Modify environment to reduce risk of injury  - Consider OT/PT consult to assist with strengthening/mobility   Outcome: Progressing      Problem: Prexisting or High Potential for Compromised Skin Integrity  Goal: Skin integrity is maintained or improved  INTERVENTIONS:  - Identify patients at risk for skin breakdown  - Assess and monitor skin integrity  - Assess and monitor nutrition and hydration status  - Monitor labs (i e  albumin)  - Assess for incontinence   - Turn and reposition patient  - Assist with mobility/ambulation  - Relieve pressure over bony prominences  - Avoid friction and shearing  - Provide appropriate hygiene as needed including keeping skin clean and dry  - Evaluate need for skin moisturizer/barrier cream  - Collaborate with interdisciplinary team (i e  Nutrition, Rehabilitation, etc )   - Patient/family teaching   Outcome: Progressing      Problem: Nutrition/Hydration-ADULT  Goal: Nutrient/Hydration intake appropriate for improving, restoring or maintaining nutritional needs  Monitor and assess patient's nutrition/hydration status for malnutrition (ex- brittle hair, bruises, dry skin, pale skin and conjunctiva, muscle wasting, smooth red tongue, and disorientation)  Collaborate with interdisciplinary team and initiate plan and interventions as ordered  Monitor patient's weight and dietary intake as ordered or per policy  Utilize nutrition screening tool and intervene per policy   Determine patient's food preferences and provide high-protein, high-caloric foods as appropriate       INTERVENTIONS:  - Monitor oral intake, urinary output, labs, and treatment plans  - Assess nutrition and hydration status and recommend course of action  - Evaluate amount of meals eaten  - Assist patient with eating if necessary   - Allow adequate time for meals  - Recommend/ encourage appropriate diets, oral nutritional supplements, and vitamin/mineral supplements  - Order, calculate, and assess calorie counts as needed  - Recommend, monitor, and adjust tube feedings and TPN/PPN based on assessed needs  - Assess need for intravenous fluids  - Provide specific nutrition/hydration education as appropriate  - Include patient/family/caregiver in decisions related to nutrition   Outcome: Progressing      Problem: PAIN - ADULT  Goal: Verbalizes/displays adequate comfort level or baseline comfort level  Interventions:  - Encourage patient to monitor pain and request assistance  - Assess pain using appropriate pain scale  - Administer analgesics based on type and severity of pain and evaluate response  - Implement non-pharmacological measures as appropriate and evaluate response  - Consider cultural and social influences on pain and pain management  - Notify physician/advanced practitioner if interventions unsuccessful or patient reports new pain   Outcome: Progressing      Problem: INFECTION - ADULT  Goal: Absence or prevention of progression during hospitalization  INTERVENTIONS:  - Assess and monitor for signs and symptoms of infection  - Monitor lab/diagnostic results  - Monitor all insertion sites, i e  indwelling lines  - Monitor nasal secretions for changes in amount and color  - [place] appropriate cooling/warming therapies per order  - Administer medications as ordered  - Instruct and encourage patient and family to use good hand hygiene technique  - Identify and instruct in appropriate isolation precautions for identified infection/condition    Outcome: Progressing      Problem: SAFETY ADULT  Goal: Maintain or return to baseline ADL function  INTERVENTIONS:  -  Assess patient's ability to carry out ADLs; assess patient's baseline for ADL function and identify physical deficits which impact ability to perform ADLs (bathing, care of mouth/teeth, toileting, grooming, dressing, etc )  - Assess/evaluate cause of self-care deficits   - Assess range of motion  - Assess patient's mobility; develop plan if impaired  - Assess patient's need for assistive devices and provide as appropriate  - Encourage maximum independence but intervene and supervise when necessary  ¯ Involve family in performance of ADLs  ¯ Assess for home care needs following discharge   ¯ Request OT consult to assist with ADL evaluation and planning for discharge  ¯ Provide patient education as appropriate   Outcome: Progressing    Goal: Maintain or return mobility status to optimal level  INTERVENTIONS:  - Assess patient's baseline mobility status (ambulation, transfers, stairs, etc )    - Identify cognitive and physical deficits and behaviors that affect mobility  - Identify mobility aids required to assist with transfers and/or ambulation (gait belt, sit-to-stand, lift, walker, cane, etc )  - [place] fall precautions as indicated by assessment  - Record patient progress and toleration of activity level on Mobility SBAR; progress patient to next Phase/Stage  - Instruct patient to call for assistance with activity based on assessment  - Request Rehabilitation consult to assist with strengthening/weightbearing, etc    Outcome: Progressing      Problem: DISCHARGE PLANNING  Goal: Discharge to home or other facility with appropriate resources  INTERVENTIONS:  - Identify barriers to discharge w/patient and caregiver  - Arrange for needed discharge resources and transportation as appropriate  - Identify discharge learning needs (meds, wound care, etc )  - Arrange for interpretive services to assist at discharge as needed  - Refer to Case Management Department for coordinating discharge planning if the patient needs post-hospital services based on physician/advanced practitioner order or complex needs related to functional status, cognitive ability, or social support system   Outcome: Progressing

## 2017-11-03 NOTE — PROGRESS NOTES
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   Etiologic Diagnosis cervical cord compression with myelopathy   Date of Onset 10/27/17   Prior Level of Function   Self-Care 2  Needed Some Help - Patient needed a partial assistance from another person to complete activities  Indoor-Mobility (Ambulation) 2  Needed Some Help - Patient needed a partial assistance from another person to complete activities  Stairs 2  Needed Some Help - Patient needed a partial assistance from another person to complete activities  Functional Cognition 3  Independent - Patient completed the activities by him/herself, with or without an assistive device, with no assistance from a helper  Prior Device Used (rollator)   Psychosocial   Psychosocial (WDL) WDL   Restrictions/Precautions   Precautions Pressure Ulcer   Weight Bearing Restrictions No   ROM Restrictions No   Pain Assessment   Pain Assessment 0-10   Pain Score 4   Pain Type Acute pain   Pain Location Back   QI: Eating   Assistance Needed Set-up / clean-up   Eating CARE Score 5   Eating Assessment   Positioning Upright;Out of Bed   Eating (FIM) 5 - Patient needs help to open contianers or set up tray   QI: Oral Hygiene   Assistance Needed Set-up / clean-up   Oral Hygiene CARE Score 5   Grooming   Able To Initiate Tasks; Acquire Items;Comb/Brush Hair;Wash/Dry Face;Brush/Clean Teeth;Wash/Dry Hands   Grooming (FIM) 5 - [place] sets up supplies or applies device   QI: Shower/Bathe Self   Assistance Needed Physical assistance   Assistance Provided by [place] 50%-74%   Shower/Bathe Self CARE Score 2   Bathing   Assessed Bath Style Sponge Bath   Anticipated D/C Bath Style Sponge Bath   Able to Gather/Transport No   Able to [employer] Temperature No   Able to Wash/Rinse/Dry (body part) L Upper Leg;R Upper Leg;Left Arm; Chest;Abdomen   Limitations Noted in Balance; Endurance   Positioning Seated;Standing   Bathing (FIM) 3 - Patient completes 5/10  6/10 or 7/10 parts   QI: Upper Body Dressing   Assistance Needed Supervision   Upper Body Dressing CARE Score 4   QI: Lower Body Dressing   Assistance Needed Physical assistance   Assistance Provided by [place] Total assistance   Lower Body Dressing CARE Score 1   QI: Putting On/Taking Off Footwear   Assistance Needed Physical assistance   Assistance Provided by [place] Total assistance   Putting On/Taking Off Footwear CARE Score 1   QI: Picking Up Object   Reason if not Attempted Safety concerns   Picking Up Object CARE Score 88   Dressing/Undressing Clothing   Remove UB Clothes (gown)   Remove LB Clothes Socks   Don UB Clothes Pullover [address] LB Clothes Undergarment;Socks   Limitations Noted In Balance; Endurance   Positioning Supported Sit;Standing   UB Dressing (FIM) 5 - Patient requires supervision/monitoring   LB Dressing (FIM) 1 - Patient requires two helpers   Toileting   Findings use bed pan prior to entry to room  declined to [facility]   Transfer Bed/Chair/Wheelchair   Limitations Noted In Balance; Endurance   Adaptive Equipment Roller Walker   Sit Pivot Maximum Assist;Assist x 2   Bed, Chair, Wheelchair Transfer (FIM) 2 - [place] needs to lift or boost to rise AND assist to sit   Tub/Shower Transfer   Findings unsafe  decreased balance/LE strength  SB PTA   Comprehension   QI: Comprehension 4  Undestands: Clear comprehension without cues or repetitions   Comprehension (FIM) 6 - Understands complex/abstract but requires more time   Expression   QI: Expression 4  Express complex messages without difficulty and with speech that is clear and easy to [place]   Expression (FIM) 7 - Expresses complex/abstract ideas in a reasonable time w/o devices or helper     Social Interaction   Cooperation with staff   Participation Individual   Social Interaction (FIM) 7 - Interacts appropriately without assistive device, medication or helper   Problem Solving   Problem solving (FIM) 5 - Solves basic problems 90% of time   Memory   Initiates Tasks Yes   Short-Term Intact   Long Term Intact   Recalls Precaution Yes   Memory (FIM) 5 - Recalls/performs request 90% of time   RUE Assessment   RUE Assessment X  (3+/5 to 4-/5)   LUE Assessment   LUE Assessment X  (3/5 to 3+/5)   Coordination   Coordination and Movement Description (foot drop RLE, resting tremor B wrists/digits)   Sensation   Light Touch No apparent deficits   Cognition   Overall Cognitive Status WFL   Arousal/Participation Alert; Cooperative   Attention Within functional limits   Orientation Level Oriented X4   Memory Decreased short term memory;Decreased recall of recent events;Decreased recall of precautions   Following Commands Follows one step commands without difficulty   Discharge Information   Patient's Discharge Plan home with family support   Patient's Rehab Expectations "to get better" "to be able to move around even with a walker"   Impressions 81 y o  Female admitted with cervical cord compression with myelopathy  Pt  Nancye Confer  Progressive weakness last 2 weeks and required extensive A from son for mobility and ADL tasks  Pt  Has 1st floor s/u with BSC  Son available 24/7 to A upon d/c with ADL/IADL tasks  Pt  Was using rollator for short distances prior to onset of weakness last 2 weeks  Pt  Was I previously for ADL tasks  Pt  Presents with decreased balance, decreased endurance, decreased BUE strength, decreased use LUE   To continue OT services per POC to reach LTG for safe d/c  ELOS 2 weeks   OT Therapy Minutes   OT Time In 0700   OT Time Out 0830   OT Total Time (minutes) 90   OT Mode of treatment - Individual (minutes) 90   OT Mode of treatment - Concurrent (minutes) 0   OT Mode of treatment - Group (minutes) 0   OT Mode of treatment - Co-treat (minutes) 0   OT Mode of Teatment - Total time(minutes) 90 minutes

## 2017-11-04 PROCEDURE — 97530 THERAPEUTIC ACTIVITIES: CPT

## 2017-11-04 PROCEDURE — 97110 THERAPEUTIC EXERCISES: CPT

## 2017-11-04 PROCEDURE — 97542 WHEELCHAIR MNGMENT TRAINING: CPT

## 2017-11-04 RX ORDER — ALBUTEROL SULFATE 90 UG/1
2 AEROSOL, METERED RESPIRATORY (INHALATION) EVERY 4 HOURS PRN
Status: DISCONTINUED | OUTPATIENT
Start: 2017-11-04 | End: 2017-11-17 | Stop reason: HOSPADM

## 2017-11-04 RX ADMIN — ACETAMINOPHEN 975 MG: 325 TABLET, FILM COATED ORAL at 22:30

## 2017-11-04 RX ADMIN — ACETAMINOPHEN 975 MG: 325 TABLET, FILM COATED ORAL at 05:41

## 2017-11-04 RX ADMIN — ANORECTAL OINTMENT 1 APPLICATION: 15.7; .44; 24; 20.6 OINTMENT TOPICAL at 18:23

## 2017-11-04 RX ADMIN — ANORECTAL OINTMENT 1 APPLICATION: 15.7; .44; 24; 20.6 OINTMENT TOPICAL at 12:21

## 2017-11-04 RX ADMIN — DEXAMETHASONE 4 MG: 4 TABLET ORAL at 05:41

## 2017-11-04 RX ADMIN — PREGABALIN 50 MG: 50 CAPSULE ORAL at 22:30

## 2017-11-04 RX ADMIN — MORPHINE SULFATE 15 MG: 15 TABLET, EXTENDED RELEASE ORAL at 19:05

## 2017-11-04 RX ADMIN — LIDOCAINE 1 PATCH: 50 PATCH CUTANEOUS at 09:28

## 2017-11-04 RX ADMIN — PANTOPRAZOLE SODIUM 40 MG: 40 TABLET, DELAYED RELEASE ORAL at 05:41

## 2017-11-04 RX ADMIN — DEXAMETHASONE 4 MG: 4 TABLET ORAL at 12:13

## 2017-11-04 RX ADMIN — ANORECTAL OINTMENT 1 APPLICATION: 15.7; .44; 24; 20.6 OINTMENT TOPICAL at 22:32

## 2017-11-04 RX ADMIN — LACTULOSE 20 G: 20 SOLUTION ORAL at 19:05

## 2017-11-04 RX ADMIN — AMLODIPINE BESYLATE 5 MG: 5 TABLET ORAL at 09:25

## 2017-11-04 RX ADMIN — DEXAMETHASONE 4 MG: 4 TABLET ORAL at 00:18

## 2017-11-04 RX ADMIN — OXYCODONE HYDROCHLORIDE 10 MG: 10 TABLET ORAL at 09:25

## 2017-11-04 RX ADMIN — DEXAMETHASONE 4 MG: 4 TABLET ORAL at 19:05

## 2017-11-04 RX ADMIN — MORPHINE SULFATE 15 MG: 15 TABLET, EXTENDED RELEASE ORAL at 09:25

## 2017-11-04 RX ADMIN — Medication 1 TABLET: at 22:30

## 2017-11-04 RX ADMIN — LACTULOSE 20 G: 20 SOLUTION ORAL at 12:13

## 2017-11-04 RX ADMIN — LEVOTHYROXINE SODIUM 100 MCG: 100 TABLET ORAL at 05:41

## 2017-11-04 RX ADMIN — HEPARIN SODIUM 5000 UNITS: 5000 INJECTION, SOLUTION INTRAVENOUS; SUBCUTANEOUS at 05:41

## 2017-11-04 RX ADMIN — PREGABALIN 50 MG: 50 CAPSULE ORAL at 09:25

## 2017-11-04 RX ADMIN — HEPARIN SODIUM 5000 UNITS: 5000 INJECTION, SOLUTION INTRAVENOUS; SUBCUTANEOUS at 22:30

## 2017-11-04 RX ADMIN — PREGABALIN 50 MG: 50 CAPSULE ORAL at 16:18

## 2017-11-04 RX ADMIN — ACETAMINOPHEN 975 MG: 325 TABLET, FILM COATED ORAL at 13:23

## 2017-11-04 RX ADMIN — HEPARIN SODIUM 5000 UNITS: 5000 INJECTION, SOLUTION INTRAVENOUS; SUBCUTANEOUS at 13:25

## 2017-11-04 NOTE — PROGRESS NOTES
11/04/17 0830   Pain Assessment   Pain Assessment 0-10   Pain Score 5   Pain Location Back; Shoulder   Pain Orientation Posterior   Hospital Pain Intervention(s) Medication (See MAR)   Restrictions/Precautions   Precautions Fall Risk;Pain;Supervision on toilet/commode   Weight Bearing Restrictions No   ROM Restrictions No   General   Change In Medical/Functional Status Pt states with neurogenic bladder   Cognition   Overall Cognitive Status WFL   Arousal/Participation Cooperative   Attention Within functional limits   Orientation Level Oriented X4   Memory Decreased short term memory;Decreased recall of recent events;Decreased recall of precautions   Following Commands Follows one step commands without difficulty   Subjective   Subjective Pt states with [place] to no sleep overnight   QI: Roll Left and Right   Assistance Needed Supervision   Assistance Provided by [place] No physical assistance   Roll Left and Right CARE Score 4   QI: Sit to Lying   Reason if not Attempted Activity not applicable   Sit to Lying CARE Score 9   QI: Lying to Sitting on Side of Bed   Assistance Needed Supervision   Assistance Provided by [place] No physical assistance   Lying to Sitting on Side of Bed CARE Score 4   QI: Sit to Stand   Assistance Needed Physical assistance   Assistance Provided by [place] 25%-49%   Sit to Stand CARE Score 3   Bed Mobility   Findings S   QI: Chair/Bed-to-Chair Transfer   Assistance Needed Physical assistance   Assistance Provided by [place] 25%-49%   Chair/Bed-to-Chair Transfer CARE Score 3   Transfer Bed/Chair/Wheelchair   Limitations Noted In Balance; Coordination; Endurance;LE Strength   Adaptive Equipment None   Stand Pivot Moderate Assist   Sit to Stand Minimal Assist   Stand to Sit Minimal Assist   Supine to Sit Supervision   Findings STS trials x 5 min A w cues for mtoor control/coordination   Bed, Chair, Wheelchair Transfer (FIM) 3 - [place] needs to lift, boost or assist to stand OR sit   QI: Car Transfer   Reason if not Attempted Activity not applicable   Car Transfer CARE Score 9   QI: Walk 10 Feet   Reason if not Attempted Activity not applicable   Walk 10 Feet CARE Score 9   QI: Walk 50 Feet with Two Turns   Reason if not Attempted Activity not applicable   Walk 50 Feet with Two Turns CARE Score 9   QI: Walk 150 Feet   Reason if not Attempted Activity not applicable   Walk 277 Feet CARE Score 9   QI: Walking 10 Feet on Uneven Surfaces   Reason if not Attempted Activity not applicable   Walking 10 Feet on Uneven Surfaces CARE Score 9   Ambulation   Does the patient walk? 2  Yes   Primary Discharge Mode of Locomotion Wheelchair   QI: Wheel 50 Feet with Two [address] Needed Verbal cues   Assistance Provided by [place] Less than 25%   Wheel 50 Feet with Two Turns CARE Score 3   QI: Wheel 150 Feet   Reason if not Attempted Activity not applicable   Wheel 535 Feet CARE Score 9   Wheelchair mobility   QI: Does the patient use a wheelchair? 1  Yes   QI: Indicate the type of wheelchair 1  Manual   Wheelchair Assist Level Minimum Assist   Method Right upper extremity; Left upper extremity   Needs Assist With Locking Brakes;Obstacles   Distance Level Surface (feet) 75 ft  (x 1)   Findings Trialed wc propulsion w manual A for navigation in hallway, turns in wc, safety mechanisms   Wheelchair (FIM) 2 - Patient wheels between 50 - 149 feet regardless of assist/set up   QI: 1 Step (Curb)   Reason if not Attempted Safety concerns   1 Step (Curb) CARE Score 88   QI: 4 Steps   Reason if not Attempted Safety concerns   4 Steps CARE Score 88   QI: 12 Steps   Reason if not Attempted Safety concerns   12 Steps CARE Score 88   QI: Picking Up Object   Reason if not Attempted Activity not applicable   Picking Up Object CARE Score 9   QI: Toilet Transfer   Assistance Needed Physical assistance   Assistance Provided by [place] 25%-49%   Toilet Transfer CARE Score 3   Toilet Transfer   Surface Assessed Raised Toilet Limitations Noted In Balance; Endurance; Safety; Sequencing;LE Strength   Adaptive Equipment Grab Bar   Positioning Concerns Safety   Findings min A w cues for safety, sequencing, pt able to complete personal hygiene however required A for LE dressing   Toilet Transfer (FIM) 4 - Patient completes 75% of all tasks   Therapeutic Interventions   Strengthening seated ankle pumps x 20; R LE motor control step onto object for coordination x 20   Equipment Use   NuStep L1 x 10' red Tband for hip stability w R UE only at this time due to L shoulder discomfort   Assessment   Treatment Assessment Focused on transitions, safety awareness and strengthening throguh functional actvities  Pt presents with back pain states w tired due to frequent urination  Pt able to perfrom mod standPT transfer min<>mod A with cues for coordination  Pt able to don LE undergarments for tolieting however requires A for R LE control during stand since pt presents w dec control overall R LE  WC propulsion S w cues for technique especially turns  Spoke to nursing about B n B at this time due to frequent voiding  Pt will cont to benefit from PT services to meet functional goals  Problem List Decreased strength;Decreased endurance; Impaired balance;Decreased mobility; Decreased cognition; Impaired judgement;Decreased safety awareness;Pain   Barriers to Discharge Inaccessible home environment   PT Barriers   Physical Impairment Decreased strength;Decreased endurance; Impaired balance;Decreased mobility; Decreased coordination;Decreased safety awareness;Decreased skin integrity   Functional Limitation Car transfers; Ramp negotiation;Stair negotiation;Standing;Transfers; Walking; Wheelchair management   Plan   Treatment/Interventions Functional transfer training;LE strengthening/ROM; Therapeutic exercise; Endurance training;Bed mobility;Gait training   Progress Progressing toward goals   Recommendation   Recommendation Home with family support   Equipment Recommended Wheelchair   PT Therapy Minutes   PT Time In 0830   PT Time Out 0930   PT Total Time (minutes) 60   PT Mode of treatment - Individual (minutes) 60   PT Mode of treatment - Concurrent (minutes) 0   PT Mode of treatment - Group (minutes) 0   PT Mode of treatment - Co-treat (minutes) 0   PT Mode of Teatment - Total time(minutes) 60 minutes   Therapy Time missed   Time missed?  No

## 2017-11-05 PROCEDURE — 97535 SELF CARE MNGMENT TRAINING: CPT

## 2017-11-05 PROCEDURE — 97542 WHEELCHAIR MNGMENT TRAINING: CPT

## 2017-11-05 PROCEDURE — 97110 THERAPEUTIC EXERCISES: CPT

## 2017-11-05 PROCEDURE — 97530 THERAPEUTIC ACTIVITIES: CPT

## 2017-11-05 RX ADMIN — DEXAMETHASONE 4 MG: 4 TABLET ORAL at 11:19

## 2017-11-05 RX ADMIN — DEXAMETHASONE 4 MG: 4 TABLET ORAL at 05:20

## 2017-11-05 RX ADMIN — OXYCODONE HYDROCHLORIDE 10 MG: 10 TABLET ORAL at 12:59

## 2017-11-05 RX ADMIN — ANORECTAL OINTMENT 1 APPLICATION: 15.7; .44; 24; 20.6 OINTMENT TOPICAL at 15:54

## 2017-11-05 RX ADMIN — HEPARIN SODIUM 5000 UNITS: 5000 INJECTION, SOLUTION INTRAVENOUS; SUBCUTANEOUS at 05:20

## 2017-11-05 RX ADMIN — Medication 1 TABLET: at 21:48

## 2017-11-05 RX ADMIN — HEPARIN SODIUM 5000 UNITS: 5000 INJECTION, SOLUTION INTRAVENOUS; SUBCUTANEOUS at 15:01

## 2017-11-05 RX ADMIN — ALBUTEROL SULFATE 2 PUFF: 90 AEROSOL, METERED RESPIRATORY (INHALATION) at 16:30

## 2017-11-05 RX ADMIN — PREGABALIN 50 MG: 50 CAPSULE ORAL at 11:19

## 2017-11-05 RX ADMIN — LEVOTHYROXINE SODIUM 100 MCG: 100 TABLET ORAL at 05:19

## 2017-11-05 RX ADMIN — PREGABALIN 50 MG: 50 CAPSULE ORAL at 21:49

## 2017-11-05 RX ADMIN — PANTOPRAZOLE SODIUM 40 MG: 40 TABLET, DELAYED RELEASE ORAL at 05:19

## 2017-11-05 RX ADMIN — DEXAMETHASONE 4 MG: 4 TABLET ORAL at 00:01

## 2017-11-05 RX ADMIN — MORPHINE SULFATE 15 MG: 15 TABLET, EXTENDED RELEASE ORAL at 17:57

## 2017-11-05 RX ADMIN — ACETAMINOPHEN 975 MG: 325 TABLET, FILM COATED ORAL at 21:48

## 2017-11-05 RX ADMIN — MORPHINE SULFATE 15 MG: 15 TABLET, EXTENDED RELEASE ORAL at 11:18

## 2017-11-05 RX ADMIN — ACETAMINOPHEN 975 MG: 325 TABLET, FILM COATED ORAL at 12:56

## 2017-11-05 RX ADMIN — ANORECTAL OINTMENT 1 APPLICATION: 15.7; .44; 24; 20.6 OINTMENT TOPICAL at 11:20

## 2017-11-05 RX ADMIN — LIDOCAINE 1 PATCH: 50 PATCH CUTANEOUS at 11:18

## 2017-11-05 RX ADMIN — DEXAMETHASONE 4 MG: 4 TABLET ORAL at 17:57

## 2017-11-05 RX ADMIN — OXYCODONE HYDROCHLORIDE 10 MG: 10 TABLET ORAL at 06:03

## 2017-11-05 RX ADMIN — HEPARIN SODIUM 5000 UNITS: 5000 INJECTION, SOLUTION INTRAVENOUS; SUBCUTANEOUS at 21:49

## 2017-11-05 RX ADMIN — BISACODYL 10 MG: 10 SUPPOSITORY RECTAL at 14:34

## 2017-11-05 RX ADMIN — AMLODIPINE BESYLATE 5 MG: 5 TABLET ORAL at 11:19

## 2017-11-05 RX ADMIN — ACETAMINOPHEN 975 MG: 325 TABLET, FILM COATED ORAL at 05:20

## 2017-11-05 RX ADMIN — PREGABALIN 50 MG: 50 CAPSULE ORAL at 16:30

## 2017-11-05 RX ADMIN — ANORECTAL OINTMENT 1 APPLICATION: 15.7; .44; 24; 20.6 OINTMENT TOPICAL at 21:48

## 2017-11-05 NOTE — PROGRESS NOTES
11/05/17 0930   Daily FIM Score   Discipline OT   Self-care   LB Dressing (FIM) 1 - Patient completes less than 25% of all tasks   Mobility   Bed, Chair, Wheelchair Transfer (FIM) 2 - [place] needs to lift or boost to rise AND assist to sit   Communication   Comprehension (FIM) 6 - Understands complex/abstract but requires more time   Expression (FIM) 6 - Expresses complex/abstract but requires:  more time   Psychosocial    Social Interaction (FIM) 6 - Interacts appropriately with others BUT requires extra  time   Cognition   Problem solving (FIM) 5 - Solves basic problems 90% of time   Memory (FIM) 5 - Needs cueing reminders <10%

## 2017-11-05 NOTE — PLAN OF CARE
DISCHARGE PLANNING     Discharge to home or other facility with appropriate resources Progressing        INFECTION - ADULT     Absence or prevention of progression during hospitalization Progressing        Nutrition/Hydration-ADULT     Nutrient/Hydration intake appropriate for improving, restoring or maintaining nutritional needs Progressing        PAIN - ADULT     Verbalizes/displays adequate comfort level or baseline comfort level Progressing        Potential for Falls     Patient will remain free of falls Progressing        Prexisting or High Potential for Compromised Skin Integrity     Skin integrity is maintained or improved Progressing        SAFETY ADULT     Maintain or return to baseline ADL function Progressing     Maintain or return mobility status to optimal level Progressing

## 2017-11-05 NOTE — PROGRESS NOTES
11/05/17 0930   Pain Assessment   Pain Assessment No/denies pain   Pain Score No Pain   Restrictions/Precautions   Precautions Fall Risk   QI: Lower Body Dressing   Assistance Needed Physical assistance   Assistance Provided by [place] Total assistance   Lower Body Dressing CARE Score 1   QI: Putting On/Taking Off Footwear   Assistance Needed Physical assistance   Assistance Provided by [place] 75% or more   Putting On/Taking Off Footwear CARE Score 2   Dressing/Undressing Clothing   LB Dressing (FIM) 1 - Patient completes less than 25% of all tasks   QI: Sit to [address] Needed Physical assistance   Assistance Provided by [place] Less than 25%   Sit to Lying CARE Score 3   QI: Lying to Sitting on Side of Bed   Assistance Needed Incidental touching   Assistance Provided by [place] Less than 25%   Lying to Sitting on Side of Bed CARE Score 3   QI: Sit to Stand   Assistance Needed Physical assistance   Assistance Provided by [place] 25%-49%   Sit to Stand CARE Score 3   QI: Chair/Bed-to-Chair Transfer   Assistance Needed Physical assistance   Assistance Provided by [place] 25%-49%   Chair/Bed-to-Chair Transfer CARE Score 3   Transfer Bed/Chair/Wheelchair   Positioning Concerns Skin Integrity   Sit Pivot Maximum Assist   Bed, Chair, Wheelchair Transfer (FIM) 2 - [place] needs to lift or boost to rise AND assist to sit   Functional Standing Tolerance   Time (up to ~6 minutes)   Assessment   Treatment Assessment Pt  participated in skilled OT intervention  Pt  tolerated therapy session well  Pt  performed ADLs and bed mobility and functional transfers as noted above  Pt  engaged in standing balance/tolerance activity at tabletop with unilateral hand release with CS to CGA as noted above  Pt  educated on LB dressing adaptive equipment and demonstrated carryover donning/doffing socks and sneakers, crossing leg over opposite leg each time as well   Pt  manipulated yellow theraputty with L hand to promote increased strength and functional use  Pt  educated on adaptive utensils and utilized built up rocker knife to "cut" theraputty after pt  reported difficulty cutting food and maintaining grasp of items in L hand  Pt  returned to room at end of session in bed  Plan to further address ADLs, IADLs, functional transfers, standing balance and tolerance  OT Therapy Minutes   OT Time In 0930   OT Time Out 1100   OT Total Time (minutes) 90   OT Mode of treatment - Individual (minutes) 90   OT Mode of treatment - Concurrent (minutes) 0   OT Mode of treatment - Group (minutes) 0   OT Mode of treatment - Co-treat (minutes) 0   OT Mode of Teatment - Total time(minutes) 90 minutes   Therapy Time missed   Time missed?  No

## 2017-11-05 NOTE — PLAN OF CARE
Problem: Potential for Falls  Goal: Patient will remain free of falls  INTERVENTIONS:  - Assess patient frequently for physical needs  -  Identify cognitive and physical deficits and behaviors that affect risk of falls  -  [place] fall precautions as indicated by assessment   - Educate patient/family on patient safety including physical limitations  - Instruct patient to call for assistance with activity based on assessment  - Modify environment to reduce risk of injury  - Consider OT/PT consult to assist with strengthening/mobility   Outcome: Progressing      Problem: Prexisting or High Potential for Compromised Skin Integrity  Goal: Skin integrity is maintained or improved  INTERVENTIONS:  - Identify patients at risk for skin breakdown  - Assess and monitor skin integrity  - Assess and monitor nutrition and hydration status  - Monitor labs (i e  albumin)  - Assess for incontinence   - Turn and reposition patient  - Assist with mobility/ambulation  - Relieve pressure over bony prominences  - Avoid friction and shearing  - Provide appropriate hygiene as needed including keeping skin clean and dry  - Evaluate need for skin moisturizer/barrier cream  - Collaborate with interdisciplinary team (i e  Nutrition, Rehabilitation, etc )   - Patient/family teaching   Outcome: Progressing      Problem: Nutrition/Hydration-ADULT  Goal: Nutrient/Hydration intake appropriate for improving, restoring or maintaining nutritional needs  Monitor and assess patient's nutrition/hydration status for malnutrition (ex- brittle hair, bruises, dry skin, pale skin and conjunctiva, muscle wasting, smooth red tongue, and disorientation)  Collaborate with interdisciplinary team and initiate plan and interventions as ordered  Monitor patient's weight and dietary intake as ordered or per policy  Utilize nutrition screening tool and intervene per policy   Determine patient's food preferences and provide high-protein, high-caloric foods as appropriate       INTERVENTIONS:  - Monitor oral intake, urinary output, labs, and treatment plans  - Assess nutrition and hydration status and recommend course of action  - Evaluate amount of meals eaten  - Assist patient with eating if necessary   - Allow adequate time for meals  - Recommend/ encourage appropriate diets, oral nutritional supplements, and vitamin/mineral supplements  - Order, calculate, and assess calorie counts as needed  - Recommend, monitor, and adjust tube feedings and TPN/PPN based on assessed needs  - Assess need for intravenous fluids  - Provide specific nutrition/hydration education as appropriate  - Include patient/family/caregiver in decisions related to nutrition   Outcome: Progressing      Problem: PAIN - ADULT  Goal: Verbalizes/displays adequate comfort level or baseline comfort level  Interventions:  - Encourage patient to monitor pain and request assistance  - Assess pain using appropriate pain scale  - Administer analgesics based on type and severity of pain and evaluate response  - Implement non-pharmacological measures as appropriate and evaluate response  - Consider cultural and social influences on pain and pain management  - Notify physician/advanced practitioner if interventions unsuccessful or patient reports new pain   Outcome: Progressing      Problem: INFECTION - ADULT  Goal: Absence or prevention of progression during hospitalization  INTERVENTIONS:  - Assess and monitor for signs and symptoms of infection  - Monitor lab/diagnostic results  - Monitor all insertion sites, i e  indwelling lines  - Monitor nasal secretions for changes in amount and color  - [place] appropriate cooling/warming therapies per order  - Administer medications as ordered  - Instruct and encourage patient and family to use good hand hygiene technique  - Identify and instruct in appropriate isolation precautions for identified infection/condition    Outcome: Progressing      Problem: SAFETY ADULT  Goal: Maintain or return to baseline ADL function  INTERVENTIONS:  -  Assess patient's ability to carry out ADLs; assess patient's baseline for ADL function and identify physical deficits which impact ability to perform ADLs (bathing, care of mouth/teeth, toileting, grooming, dressing, etc )  - Assess/evaluate cause of self-care deficits   - Assess range of motion  - Assess patient's mobility; develop plan if impaired  - Assess patient's need for assistive devices and provide as appropriate  - Encourage maximum independence but intervene and supervise when necessary  ¯ Involve family in performance of ADLs  ¯ Assess for home care needs following discharge   ¯ Request OT consult to assist with ADL evaluation and planning for discharge  ¯ Provide patient education as appropriate   Outcome: Progressing    Goal: Maintain or return mobility status to optimal level  INTERVENTIONS:  - Assess patient's baseline mobility status (ambulation, transfers, stairs, etc )    - Identify cognitive and physical deficits and behaviors that affect mobility  - Identify mobility aids required to assist with transfers and/or ambulation (gait belt, sit-to-stand, lift, walker, cane, etc )  - [place] fall precautions as indicated by assessment  - Record patient progress and toleration of activity level on Mobility SBAR; progress patient to next Phase/Stage  - Instruct patient to call for assistance with activity based on assessment  - Request Rehabilitation consult to assist with strengthening/weightbearing, etc    Outcome: Progressing      Problem: DISCHARGE PLANNING  Goal: Discharge to home or other facility with appropriate resources  INTERVENTIONS:  - Identify barriers to discharge w/patient and caregiver  - Arrange for needed discharge resources and transportation as appropriate  - Identify discharge learning needs (meds, wound care, etc )  - Arrange for interpretive services to assist at discharge as needed  - Refer to Case Management Department for coordinating discharge planning if the patient needs post-hospital services based on physician/advanced practitioner order or complex needs related to functional status, cognitive ability, or social support system   Outcome: Progressing

## 2017-11-05 NOTE — PROGRESS NOTES
11/05/17 1230   Pain Assessment   Pain Assessment 0-10   Pain Score 5   Pain Type Acute pain   Pain Location Shoulder   Pain Orientation Left;Posterior   Pain Frequency Constant/continuous   Hospital Pain Intervention(s) Medication (See MAR)   Restrictions/Precautions   Precautions Fall Risk;Limb alert  (L UE)   General   Change In Medical/Functional Status pt continue to report of being incontinent ayleen at night    Cognition   Overall Cognitive Status [facility]   Arousal/Participation Cooperative; Alert   Attention Within functional limits   Orientation Level Oriented X4   Memory Decreased short term memory;Decreased recall of recent events;Decreased recall of precautions   Following Commands Follows one step commands with increased time or repetition   Subjective   Subjective pt reported 5/10 L shoulder pain during PT session  also reported lack of sleep at HS due to being incontinent, asking to use bedpan every 45 mins   QI: Roll Left and Right   Assistance Needed Supervision   Roll Left and Right CARE Score 4   QI: Sit to Lying   Assistance Needed Physical assistance   Assistance Provided by [place] Less than 25%   Comment min-CG with R LE   Sit to Lying CARE Score 3   QI: Lying to Sitting on Side of Bed   Assistance Needed Incidental touching   Comment CG-CS   Lying to Sitting on Side of Bed CARE Score 4   QI: Sit to Stand   Assistance Needed Physical assistance   Assistance Provided by [place] 25%-49%   Sit to Stand CARE Score 3   Bed Mobility   Findings min-CS   QI: Chair/Bed-to-Chair Transfer   Assistance Needed Physical assistance   Assistance Provided by [place] 50%-74%   Comment min-mod sit/stand pivot without an AD   Chair/Bed-to-Chair Transfer CARE Score 2   Transfer Bed/Chair/Wheelchair   Limitations Noted In LE Strength;UE Strength; Endurance; Coordination;Balance;Sensation   Adaptive Equipment None   Sit Pivot Minimal Assist;Moderate Assist  (repeated training  x 10 reps)   Stand Pivot Minimal Assist;Moderate Assist   Sit to Stand Minimal Assist   Stand to Sit Minimal Assist   Supine to Sit Contact Guard;Supervision   Sit to Supine Minimal Assist;Supervision   Findings instructed to do sit pivot versus stand pivot for safety at this time   Bed, Chair, Wheelchair Transfer (FIM) 2 - [place] needs to lift or boost to rise AND assist to sit   QI: Car Transfer   Reason if not Attempted Activity not applicable   Car Transfer CARE Score 9   QI: Walk 10 Feet   Assistance Needed Physical assistance   Assistance Provided by [place] Total assistance   Comment mod of 1 with CF on // bars with step by step cueing for sequencing   Walk 10 Feet CARE Score 1   QI: Walk 50 Feet with Two Turns   Reason if not Attempted Activity not applicable   Walk 50 Feet with Two Turns CARE Score 9   QI: Walk 150 Feet   Reason if not Attempted Activity not applicable   Walk 201 Feet CARE Score 9   QI: Walking 10 Feet on Uneven Surfaces   Reason if not Attempted Activity not applicable   Walking 10 Feet on Uneven Surfaces CARE Score 9   Ambulation   Does the patient walk? 2  Yes   Primary Discharge Mode of Locomotion Wheelchair   Walk Assist Level Minimum Assist   Gait Pattern Decreased foot clearance; Inconsistant Barb;Narrow JAE; Ataxic; Improper weight shift; Step to   Assist Device Parallel Bars   Distance Walked (feet) 10 ft   Limitations Noted In Balance; Coordination; Endurance; Safety;Posture;Sensation;Speed;Strength;Swing;Sequencing   Walking (FIM) 1 - Patient requires assist of two people   QI: Wheel 50 Feet with [address]  Needed Physical assistance   Assistance Provided by [place] 25%-49%   Comment min with turns while S in straight path using bilat UE    Wheel 50 Feet with Two Turns CARE Score 3   QI: Wheel 150 Feet   Assistance Needed Physical assistance   Assistance Provided by [place] 25%-49%   Wheel 150 Feet CARE Score 3   Wheelchair mobility   QI: Does the patient use a wheelchair? 1  Yes   QI: Indicate the type of wheelchair 1  Manual   Wheelchair Assist Level Minimum Assist   Method Left upper extremity;Right upper extremity   Needs Assist With Locking Brakes;Replace Leg Rest;Remove Leg Rest   Distance Level Surface (feet) 150 ft   Findings extra time required with rest breaks   Wheelchair (FIM) 4 - Patient requires incidental assist around corners or doorways  AND wheels distance 150 feet or more, no rest   QI: 1 Step (Curb)   Reason if not Attempted Safety concerns   1 Step (Curb) CARE Score 88   QI: 4 Steps   Reason if not Attempted Safety concerns   4 Steps CARE Score 88   QI: 12 Steps   Reason if not Attempted Safety concerns   12 Steps CARE Score 88   Stairs   Findings to be assess when safe and appropriate   QI: Picking Up Object   Reason if not Attempted Activity not applicable   Picking Up Object CARE Score 9   QI: Toilet Transfer   Reason if not Attempted Activity not applicable   Toilet Transfer CARE Score 9   Toilet Transfer   Toilet Transfer (FIM) 0 - Activity does not occur   Therapeutic Interventions   Strengthening supine A/AROM bilat heel slides, SLR, hip abd x 10 reps x 3 sets, sidelying clamshell x 10 reps x 3 sets, gluteal sets x 30 reps, ankle pumps x 30 reps SAQ'  x 3 SH x 10 reps x 3 sets with emphasis on technique and motor control ayleen with R LE   Assessment   Treatment Assessment PT session focused on transfer training, w/c mobility training and strengthening exercises with emphasis on improving motor control a[place]en with R LE  Due to impaired coordination, sensation and mm weakness pt required step by step cueing during gait training on the // bars with cues to take small steps to facilitate WS and stability  R knee needed to be block during pivot transfers for stability as well  pt needed stabilization of R LE during therapeutic exercises to perform exercises correctly  pt will benefit from continued skilled PT services to improve functional mobilities to decrease burden of care at d/c      Family/Caregiver Present no   Problem List Decreased strength;Decreased endurance; Impaired balance;Decreased mobility; Decreased coordination;Decreased safety awareness;Pain; Impaired sensation   Barriers to Discharge Inaccessible home environment;Decreased caregiver support   PT Barriers   Physical Impairment Decreased strength;Decreased range of motion;Decreased endurance; Impaired balance;Decreased mobility; Decreased coordination;Decreased safety awareness; Impaired sensation;Pain;Decreased skin integrity   Functional Limitation Car transfers; Ramp negotiation;Stair negotiation;Standing;Transfers; Walking; Wheelchair management   Plan   Treatment/Interventions Therapeutic exercise;LE strengthening/ROM; Functional transfer training; Endurance training;Bed mobility;Gait training;Spoke to nursing   Progress Progressing toward goals   Recommendation   Recommendation 24 hour supervision/assist;Home with family support   Equipment Recommended Wheelchair   PT - OK to Discharge No   PT Therapy Minutes   PT Time In 1230   PT Time Out 1400   PT Total Time (minutes) 90   PT Mode of treatment - Individual (minutes) 90   PT Mode of treatment - Concurrent (minutes) 0   PT Mode of treatment - Group (minutes) 0   PT Mode of treatment - Co-treat (minutes) 0   PT Mode of Teatment - Total time(minutes) 90 minutes   Therapy Time missed   Time missed?  No

## 2017-11-05 NOTE — PROGRESS NOTES
Internal Medicine Progress Note  Patient: Dagoberto Garcia  Age/sex: 81 y o  female  Medical Record #: [id number]      ASSESSMENT/PLAN:  Dagoberto Garcia is seen and examined and mangement for following issues:    1  Cervical cord compression with myelopathy/Mass at [facility]: Likely related to metastatic disease  Currently undergoing XRT for 10 sessions to lumbar and cervical spine  Continue Decadron 4mg Q6 hours  Pain control  Therapy per primary team    2  Metastatic breast cancer: patient with moderate left pleural effusion, multiple hepatic lesions, possible adrenal mass and multiple bony lesions  Overall prognosis poor  Outpatient follow up with primary oncologist  Previously on Xeloda  3  Left pleural effusion: likely malignant  Monitor respiratory status  Currently denies complaints of SOB  4  Hypertension: continue norvasc and monitor blood pressure  5  Hypothyroidism: continue levothyroxine  6  Peripheral neuropathy: continue Lyrica  7  DVT prophylaxis: SQ heparin               Subjective: Patient seen and examined  Pt reports constipation and has requested a suppository after therapy  She is eating well  She has been waking up at night due to urinary frequency and incontinence  She denies any other complaints      ROS:   GI: denies abdominal pain, change bowel habits or reflux symptoms  Neuro: No new neurologic changes  Respiratory: No Cough, SOB  Cardiovascular: No CP, palpitations     Scheduled Meds:    acetaminophen 975 mg Oral Q8H [address]   amLODIPine 5 mg Oral Daily   dexamethasone 4 mg Oral Q6H [place]   heparin (porcine) 5,000 Units Subcutaneous Q8H [address]   lactulose 20 g Oral BID   levothyroxine 100 mcg Oral Early Morning   lidocaine 1 patch Transdermal Daily   menthol-zinc oxide 1 application Topical TID   morphine 15 mg Oral BID   pantoprazole 40 mg Oral Early Morning   pregabalin 50 mg Oral TID   senna-docusate sodium 1 tablet Oral HS       Labs:       Results from last 7 days  Lab Units 10/31/17  0508   WBC Thousand/uL 5 98   HEMOGLOBIN g/dL 12 3   HEMATOCRIT % 37 1   PLATELETS Thousands/uL 289       Results from last 7 days  Lab Units 10/31/17  0508   SODIUM mmol/L 139   POTASSIUM mmol/L 4 1   CHLORIDE mmol/L 106   CO2 mmol/L 26   BUN mg/dL 12   CREATININE mg/dL 0 49*   GLUCOSE RANDOM mg/dL 101   CALCIUM mg/dL 8 7                  Glucose (mg/dL)   Date Value   10/31/2017 101   10/29/2017 120   10/28/2017 85   10/27/2017 91       Labs reviewed    Physical Examination:  Vitals:   Vitals:    11/03/17 2336 11/04/17 0840 11/04/17 1609 11/04/17 2349   BP: 130/77 115/69 104/70 104/62   Pulse: 77 86 78 82   Resp: 18 18 18 20   Temp: 97 8 °F (36 6 °C) 97 6 °F (36 4 °C) 97 7 °F (36 5 °C) 98 °F (36 7 °C)   TempSrc: Oral Oral Tympanic Oral   SpO2: 99% 100% 100% 99%   Weight:       Height:           GEN: NAD  HEENT: NC/AT, EOMI  RESP: CTAB, no R/R/W  CV: +S1 S2, regular rate, no rubs  ABD: soft, NT, ND, normal BS   : No Singh  EXT: No edema  Skin: No rashes  Neuro: AAOx3  Bilat pill rolling tremor  [ X ] Total time spent: 30 Mins and greater than 50% of this time was spent counseling/coordinating care        Vibha Byrd PA-C  Internal Medicine

## 2017-11-06 LAB
ANION GAP SERPL CALCULATED.3IONS-SCNC: 7 MMOL/L (ref 4–13)
ANISOCYTOSIS BLD QL SMEAR: PRESENT
BASOPHILS # BLD MANUAL: 0 THOUSAND/UL (ref 0–0.1)
BASOPHILS NFR MAR MANUAL: 0 % (ref 0–1)
BUN SERPL-MCNC: 23 MG/DL (ref 5–25)
CALCIUM SERPL-MCNC: 7.8 MG/DL (ref 8.3–10.1)
CHLORIDE SERPL-SCNC: 102 MMOL/L (ref 100–108)
CO2 SERPL-SCNC: 25 MMOL/L (ref 21–32)
CREAT SERPL-MCNC: 0.48 MG/DL (ref 0.6–1.3)
EOSINOPHIL # BLD MANUAL: 0 THOUSAND/UL (ref 0–0.4)
EOSINOPHIL NFR BLD MANUAL: 0 % (ref 0–6)
ERYTHROCYTE [DISTWIDTH] IN BLOOD BY AUTOMATED COUNT: 14.3 % (ref 11.6–15.1)
GFR SERPL CREATININE-BSD FRML MDRD: 115 ML/MIN/1.73SQ M
GLUCOSE P FAST SERPL-MCNC: 102 MG/DL (ref 65–99)
GLUCOSE SERPL-MCNC: 102 MG/DL (ref 65–140)
HCT VFR BLD AUTO: 35.8 % (ref 34.8–46.1)
HGB BLD-MCNC: 11.9 G/DL (ref 11.5–15.4)
LYMPHOCYTES # BLD AUTO: 0.27 THOUSAND/UL (ref 0.6–4.47)
LYMPHOCYTES # BLD AUTO: 4 % (ref 14–44)
MCH RBC QN AUTO: 27.9 PG (ref 26.8–34.3)
MCHC RBC AUTO-ENTMCNC: 33.2 G/DL (ref 31.4–37.4)
MCV RBC AUTO: 84 FL (ref 82–98)
MONOCYTES # BLD AUTO: 0.07 THOUSAND/UL (ref 0–1.22)
MONOCYTES NFR BLD: 1 % (ref 4–12)
NEUTROPHILS # BLD MANUAL: 6.31 THOUSAND/UL (ref 1.85–7.62)
NEUTS SEG NFR BLD AUTO: 95 % (ref 43–75)
NRBC BLD AUTO-RTO: 0 /100 WBCS
PLATELET # BLD AUTO: 232 THOUSANDS/UL (ref 149–390)
PLATELET BLD QL SMEAR: ADEQUATE
PMV BLD AUTO: 9.4 FL (ref 8.9–12.7)
POTASSIUM SERPL-SCNC: 4.8 MMOL/L (ref 3.5–5.3)
RBC # BLD AUTO: 4.26 MILLION/UL (ref 3.81–5.12)
RBC MORPH BLD: PRESENT
SODIUM SERPL-SCNC: 134 MMOL/L (ref 136–145)
WBC # BLD AUTO: 6.64 THOUSAND/UL (ref 4.31–10.16)

## 2017-11-06 PROCEDURE — DP0C1ZZ BEAM RADIATION OF OTHER BONE USING PHOTONS 1 - 10 MEV: ICD-10-PCS | Performed by: PHYSICAL MEDICINE & REHABILITATION

## 2017-11-06 PROCEDURE — 97530 THERAPEUTIC ACTIVITIES: CPT | Performed by: PHYSICAL THERAPIST

## 2017-11-06 PROCEDURE — 77336 RADIATION PHYSICS CONSULT: CPT | Performed by: RADIOLOGY

## 2017-11-06 PROCEDURE — 97110 THERAPEUTIC EXERCISES: CPT | Performed by: PHYSICAL THERAPIST

## 2017-11-06 PROCEDURE — 77412 RADIATION TX DELIVERY LVL 3: CPT | Performed by: RADIOLOGY

## 2017-11-06 PROCEDURE — 85007 BL SMEAR W/DIFF WBC COUNT: CPT | Performed by: PHYSICIAN ASSISTANT

## 2017-11-06 PROCEDURE — 80048 BASIC METABOLIC PNL TOTAL CA: CPT | Performed by: PHYSICIAN ASSISTANT

## 2017-11-06 PROCEDURE — 77387 GUIDANCE FOR RADJ TX DLVR: CPT | Performed by: RADIOLOGY

## 2017-11-06 PROCEDURE — 85027 COMPLETE CBC AUTOMATED: CPT | Performed by: PHYSICIAN ASSISTANT

## 2017-11-06 PROCEDURE — 97535 SELF CARE MNGMENT TRAINING: CPT

## 2017-11-06 PROCEDURE — 97112 NEUROMUSCULAR REEDUCATION: CPT | Performed by: PHYSICAL THERAPIST

## 2017-11-06 RX ORDER — TOLTERODINE 4 MG/1
4 CAPSULE, EXTENDED RELEASE ORAL DAILY
Status: DISCONTINUED | OUTPATIENT
Start: 2017-11-06 | End: 2017-11-17 | Stop reason: HOSPADM

## 2017-11-06 RX ADMIN — OXYCODONE HYDROCHLORIDE 10 MG: 10 TABLET ORAL at 07:51

## 2017-11-06 RX ADMIN — MORPHINE SULFATE 15 MG: 15 TABLET, EXTENDED RELEASE ORAL at 17:48

## 2017-11-06 RX ADMIN — Medication 1 TABLET: at 22:33

## 2017-11-06 RX ADMIN — DEXAMETHASONE 4 MG: 4 TABLET ORAL at 17:49

## 2017-11-06 RX ADMIN — ALBUTEROL SULFATE 2 PUFF: 90 AEROSOL, METERED RESPIRATORY (INHALATION) at 16:14

## 2017-11-06 RX ADMIN — DEXAMETHASONE 4 MG: 4 TABLET ORAL at 06:53

## 2017-11-06 RX ADMIN — ANORECTAL OINTMENT 1 APPLICATION: 15.7; .44; 24; 20.6 OINTMENT TOPICAL at 09:23

## 2017-11-06 RX ADMIN — HEPARIN SODIUM 5000 UNITS: 5000 INJECTION, SOLUTION INTRAVENOUS; SUBCUTANEOUS at 06:55

## 2017-11-06 RX ADMIN — ANORECTAL OINTMENT 1 APPLICATION: 15.7; .44; 24; 20.6 OINTMENT TOPICAL at 22:33

## 2017-11-06 RX ADMIN — ANORECTAL OINTMENT 1 APPLICATION: 15.7; .44; 24; 20.6 OINTMENT TOPICAL at 16:14

## 2017-11-06 RX ADMIN — HEPARIN SODIUM 5000 UNITS: 5000 INJECTION, SOLUTION INTRAVENOUS; SUBCUTANEOUS at 22:33

## 2017-11-06 RX ADMIN — HEPARIN SODIUM 5000 UNITS: 5000 INJECTION, SOLUTION INTRAVENOUS; SUBCUTANEOUS at 15:23

## 2017-11-06 RX ADMIN — DEXAMETHASONE 4 MG: 4 TABLET ORAL at 00:39

## 2017-11-06 RX ADMIN — PREGABALIN 50 MG: 50 CAPSULE ORAL at 16:14

## 2017-11-06 RX ADMIN — LIDOCAINE 1 PATCH: 50 PATCH CUTANEOUS at 09:22

## 2017-11-06 RX ADMIN — TOLTERODINE TARTRATE 4 MG: 4 CAPSULE, EXTENDED RELEASE ORAL at 11:54

## 2017-11-06 RX ADMIN — ACETAMINOPHEN 975 MG: 325 TABLET, FILM COATED ORAL at 22:33

## 2017-11-06 RX ADMIN — LACTULOSE 20 G: 20 SOLUTION ORAL at 17:49

## 2017-11-06 RX ADMIN — MORPHINE SULFATE 15 MG: 15 TABLET, EXTENDED RELEASE ORAL at 09:20

## 2017-11-06 RX ADMIN — ACETAMINOPHEN 975 MG: 325 TABLET, FILM COATED ORAL at 06:53

## 2017-11-06 RX ADMIN — PREGABALIN 50 MG: 50 CAPSULE ORAL at 22:33

## 2017-11-06 RX ADMIN — ACETAMINOPHEN 975 MG: 325 TABLET, FILM COATED ORAL at 15:18

## 2017-11-06 RX ADMIN — PREGABALIN 50 MG: 50 CAPSULE ORAL at 09:20

## 2017-11-06 RX ADMIN — LEVOTHYROXINE SODIUM 100 MCG: 100 TABLET ORAL at 06:53

## 2017-11-06 RX ADMIN — DEXAMETHASONE 4 MG: 4 TABLET ORAL at 11:54

## 2017-11-06 RX ADMIN — PANTOPRAZOLE SODIUM 40 MG: 40 TABLET, DELAYED RELEASE ORAL at 06:53

## 2017-11-06 NOTE — PCC OCCUPATIONAL THERAPY
Pt  Presents with decreased endurance, decreased standing balance, decreased coordination and strength BLE, increased pain, decreased core and UB strength  Pt  Motivated and actively participating in therapy  Pt  Has son whom is primary caregiver and will continue to A upon d/c  To continue OT services per POC to address the later and implement family training for safe d/c home  11/13/2017:  Pt has made limited gains with ADLs  Pt continues to be limited decreased endurance, decreased standing balance, decreased coordination and strength BLE, increased pain, decreased core and UB strength  Family training has been initiated

## 2017-11-06 NOTE — PROGRESS NOTES
Physical Medicine and Rehabilitation Progress Note:  Spinal Cord Dysfunction:  Non-Traumatic:  04 130 Other Non-Traumatic Cord Compression  Jim Womack 81 y o  female MRN: [id number]  Unit/Bed#: -01 Encounter: [id number]    Assessment:   Kem Cotto is a 81 y o  female who  has a past medical history of Arthritis; Asthma; Breast cancer ([address] ); Disease of thyroid gland; and Hypertension  and presented to the [address] after outpatient MRI demonstrated cord compression  Palliative treatment to spine started  Surgical intervention not recommended due to extent of metastasis  She was [facility] to [facility] on [facility]17  Subjective: No acute events overnight  Rani Beasley was seen while in her room  Endorses a feeling of becoming stronger, especially with regards to left leg weakness  No CP or SOB    ROS: A 10-point ROS was performed  Negative except as listed above  Restrictions include:  Fall precautions    Disposition: TBD    Plan:  # Cord compression, myelopathy  - Acute comprehensive interdisciplinary inpatient rehabilitation including PT, OT, SLP, RN, CM, SW, dietary, psychology, etc   - Appreciate Internal Medicine following - Dr Linda Saha service      - secondary to spinal mets  - undergoing XRT (total of 10 sessions)  - Continue steroids     # Metastatic breast CA  - f/u with oncology as outpatient  - noted to have edema to LUE, will require compression garment     # Neuropathy  - continue lyrica     # Anemia (improved)  - Likely reactive  - continue monitoring CBC       Results from last 7 days  Lab Units 11/06/17  0614 10/31/17  0508   HEMOGLOBIN g/dL 11 9 12 3       # HTN  - Continue Amlodipine     Temp:  [97 7 °F (36 5 °C)-98 °F (36 7 °C)] 97 8 °F (36 6 °C)  HR:  [78-86] 78  Resp:  [18-20] 18  BP: (100-110)/(64-76) 108/69    # Hypothyroid  - Continue levothyroxine      # Pain  - Continue tylenol PRN, for max of 3gm daily     - Continue oxycodone   - Continue MS Contin  - This patient record was searched in the [place] PDMP and the risks/benefits of prescribing this patient medications with high abuse/dependence potential was assessed and determined to be medically appropriate at this time  I could not find any prescriptions in [place], [place]  Patient reports she was getting oxycodone 10mg TID (chart review notes she was taking 10mg Q6h PRN), and MS contin 15mg BID      # Rehab Psych  - Continue alprazolam PRN  - Neuropsych consult, appreciate recs     # FENA/prophy  - Diet: cardiac   SLP and nutrition to monitor and adjust as necessary   - DVT prophy: Sequential compression device (Venodyne)  and Heparin  - GI ppx: Pantaprazole  - Bowel: colace , dulcolax suppository  and miralax PRN  - Nausea: Zofran  - Supplements: None  - Sleep: None      CODE: Level 1: Full Code    Objective:  Nursing staff reporting: no problems    Functional Update:  Physical Therapy Occupational Therapy Speech Therapy      Sit Pivot Minimal Assist;Moderate Assist  (repeated training  x 10 reps)   Stand Pivot Minimal Assist;Moderate Assist   Sit to Stand Minimal Assist   Stand to Sit Minimal Assist   Supine to Sit Contact Guard;Supervision   Sit to Supine Minimal Assist;Supervision   Findings instructed to do sit pivot versus stand pivot for safety at this time   Bed, Chair, Wheelchair Transfer (FIM) 2 - [place] needs to lift or boost to rise AND assist to sit         Bed, Chair, Wheelchair Transfer (FIM) 2 - [place] needs to lift or boost to rise AND assist to sit     Self-care   LB Dressing (FIM) 1 - Patient completes less than 25% of all tasks   Mobility   Bed, Chair, Wheelchair Transfer (FIM) 2 - [place] needs to lift or boost to rise AND assist to sit   Communication   Comprehension (FIM) 6 - Understands complex/abstract but requires more time   Expression (FIM) 6 - Expresses complex/abstract but requires:  more time   Psychosocial    Social Interaction (FIM) 6 - Interacts appropriately with others BUT requires extra  time   Cognition   Problem solving (FIM) 5 - Solves basic problems 90% of time   Memory (FIM) 5 - Needs cueing reminders <10%                  Allergies and Medications per EMR    Physical Exam:  General: alert, no apparent distress, cooperative and comfortable  HENMT: Head: Normal, normocephalic, atraumatic  Eye: Normal external eye, conjunctiva, lids   Ears: Normal external ears  Nose: Normal external nose, mucus membranes  Pulmonary: chest expansion normal, no retractions, no accessory muscle usage  Abdomen: soft, nontender, nondistended, no masses or organomegaly  Skin/Extremity: no rashes, no erythema, LUE edema   Neurologic: Awake alert orientedx3  Psych: normal mood, behavior, speech, dress, and thought processes    Diagnostic Studies: reviewed, no new imaging    Vitals:  Temp:  [97 7 °F (36 5 °C)-98 °F (36 7 °C)] 97 8 °F (36 6 °C)  HR:  [78-86] 78  Resp:  [18-20] 18  BP: (100-110)/(64-76) 108/69   Intake/Output Summary (Last 24 hours) at 11/06/17 1149  Last data filed at 11/06/17 0900   Gross per 24 hour   Intake              640 ml   Output              600 ml   Net               40 ml        Laboratory:    Lab Results   Component Value Date    HGB 11 9 11/06/2017    HCT 35 8 11/06/2017    WBC 6 64 11/06/2017     Lab Results   Component Value Date    BUN 23 11/06/2017     (L) 11/06/2017    K 4 8 11/06/2017     11/06/2017    GLUCOSE 102 11/06/2017    CREATININE 0 48 (L) 11/06/2017     No results found for: PROTIME, INR     Patient Active Problem List   Diagnosis    C[address]ical cord compression with myelopathy [facility] )    Breast cancer ([facility] )    Essential hypertension    Hypothyroidism       ** Please Note: Fluency Direct voice to text software may have been used in the creation of this document  **    [ x ] Total time spent: 30 Mins, and greater than 50% of this time was spent counseling/coordinating care

## 2017-11-06 NOTE — PROGRESS NOTES
11/06/17 0830   Pain Assessment   Pain Assessment No/denies pain   Pain Score No Pain   Transfer Bed/Chair/Wheelchair   Sit Pivot Minimal Assist   Sit to Stand Minimal   Stand to Sit Supervision   Findings WC to recliner chair   Bed, Chair, Wheelchair Transfer (FIM) 4 - Patient completes 75% of all tasks   Assessment   Treatment Assessment OT tx session focus on standing balance, toileting tasks, and functional transfers  Upon approach Pt not wearing breif, and reports that she is incontinent at baseline  pt particiaptes in standing trails in bathroom w/ grab bars  pt completing unilateral UE release and management of clothing in stance  Pt reports that she does not typically wear pants at home, just night gowns  Pt requires unilateral support on grab bar in stance but is able to switch hands for clothing management  Pt completes functional transfer sit pivot from WC to recliner chair w/ overall MIN A  cues for foot placement required 2* twisting LE during transfer  pt reports that she will have assit at D/C from family  Prognosis Fair   Problem List Decreased strength;Decreased range of motion;Decreased endurance; Impaired balance   Plan   Treatment/Interventions ADL retraining;LE strengthening/ROM   OT Therapy Minutes   OT Time In 0830   OT Time Out 0900   OT Total Time (minutes) 30   OT Mode of treatment - Individual (minutes) 30   OT Mode of treatment - Concurrent (minutes) 0   OT Mode of treatment - Group (minutes) 0   OT Mode of treatment - Co-treat (minutes) 0   OT Mode of Teatment - Total time(minutes) 30 minutes   Therapy Time missed   Time missed?  No

## 2017-11-06 NOTE — PCC PHYSICAL THERAPY
11/13/17  Pt cont to have difficulty with coordination B LE; safely sit pivots to R side with S; w/c propel short distances with min/modA but cannot propel independently; R LE knee lolis with weight bearing and attempts at ambulation; recommend pt stay at w/c level for mobility and use only sit pivot xfers; recommend cont PT POC;     11/6/17  Pt presents with RLE coordination deficits, dec motor control, dec balance, dec activity tolerance, pressure ulcers on buttocks, LUE edema (limb alert)  Pt lives with son who will be able to provide 24/7 S and assist  Currently recommending w/c as primary means of mobility 2* BLE buckling during ambulation in parallel bars  Pt showing improvements with sit pivots; can perform @ min A/CG level  Pt has difficulty with w/c mobility with obstacles or turns  Barriers are 3 JAN (although son will be able to assist)  Pt will cont to benefit from skilled PT to maximize I with sit pivots and w/c mobility and to dec overall burden of care

## 2017-11-06 NOTE — PROGRESS NOTES
11/06/17 1335   Pain Assessment   Pain Assessment 0-10   Pain Score 4   Pain Type Acute pain   Pain Location Back   Pain Orientation Lower   Hospital Pain Intervention(s) Medication (See MAR); Repositioned   Response to Interventions pt able to tolerate PT session    Restrictions/Precautions   Precautions Fall Risk;Limb alert;Pressure Ulcer  (LUE)   Cognition   Arousal/Participation Alert; Cooperative   Subjective   Subjective Pt reports feeling very fatigued @ end of session; "I'm wiped out"   QI: Sit to [address] Provided by [place] No physical assistance   Sit to Lying CARE Score 4   QI: Lying to Sitting on Side of Bed   Assistance Needed Supervision   Assistance Provided by [place] No physical assistance   Lying to Sitting on Side of Bed CARE Score 4   QI: Sit to Stand   Assistance Needed Physical assistance   Assistance Provided by [place] 25%-49%   Sit to Stand CARE Score 3   QI: Chair/Bed-to-Chair Transfer   Assistance Needed Physical assistance   Assistance Provided by [place] 25%-49%   Chair/Bed-to-Chair Transfer CARE Score 3   Transfer Bed/Chair/Wheelchair   Limitations Noted In Balance; Coordination; Endurance;LE Strength   Sit Pivot Minimal Assist;Contact Guard   Sit to Stand Minimal Assist   Stand to Sit Minimal Assist   Supine to Sit Supervision   Sit to Supine Supervision   Bed, Chair, Wheelchair Transfer (FIM) 2 - [place] needs to lift or boost to rise AND assist to sit   QI: Wheel 50 Feet with Two Turns   Assistance Needed Supervision;Physical assistance   Assistance Provided by [place] Less than 25%   Wheel 50 Feet with Two Turns CARE Score 3   QI: Wheel 150 Feet   Reason if not Attempted Activity not applicable   Wheel 471 Feet CARE Score 9   Wheelchair mobility   QI: Does the patient use a wheelchair? 1  Yes   QI: Indicate the type of wheelchair 1  Manual   Wheelchair Assist Level Supervision;Minimum Assist   Method Right upper extremity; Left upper extremity Needs Assist With Replace armrests; Remove armrests;Replace Leg Rest;Remove Leg Rest;Locking Brakes;Obstacles   Distance Level Surface (feet) 150 ft  (x2)   Findings also trialed pt without w/c cushion so she could use LE's to propel; difficulty with this 2* RLE coordination deficits; trialed with BUE's and LLE as well    Wheelchair (FIM) 4 - Patient requires incidental assist around corners or doorways  AND wheels distance 150 feet or more, no rest   Therapeutic Interventions   Strengthening supine on mat: bridges; SAQ; hooklying RLE ER/IR and trying to maintain R knee in neutral position; hooklying ball squeezes; R hip abduction; R heel slides; SLR   Equipment Use   LE Bike x 10 min  Assessment   Treatment Assessment Pt participated in skilled PT session with focus on RLE coordination/motor control and overall functional mobility  Pt required AAROM/ tc's during supine mat exercises to improve motor control and perform smooth controlled motion; RLE cont to have significant coordination/motor control deficits  Focused on w/c mobility; trialed various methods (see above); pt prefers using BUE's  but becomes fatigued afterwards; requires A with obstacles and turning  Noted improvements in sit pivot transfers today; pt was able to perform @ CG level and is showing more initiative in making sure transfer is set up safely  Pt will cont to benefit from skilled PT to progress overall functional mobility  Barriers to Discharge Inaccessible home environment   PT Barriers   Physical Impairment Decreased strength;Decreased range of motion;Decreased endurance; Impaired balance;Decreased mobility; Decreased coordination;Decreased safety awareness;Pain;Decreased skin integrity   Functional Limitation Car transfers; Ramp negotiation;Stair negotiation;Standing;Transfers; Walking; Wheelchair management   Plan   Treatment/Interventions Functional transfer training;LE strengthening/ROM; Elevations; Therapeutic exercise; Endurance training;Patient/family training;Equipment eval/education; Bed mobility;Gait training   Progress Progressing toward goals   Recommendation   Recommendation 24 hour supervision/assist;Home with family support;Home PT   Equipment Recommended Wheelchair   PT Therapy Minutes   PT Time In 1335   PT Time Out 1505   PT Total Time (minutes) 90   PT Mode of treatment - Individual (minutes) 60   PT Mode of treatment - Concurrent (minutes) 30   PT Mode of treatment - Group (minutes) 0   PT Mode of treatment - Co-treat (minutes) 0   PT Mode of Teatment - Total time(minutes) 90 minutes   Therapy Time missed   Time missed?  No

## 2017-11-06 NOTE — PROGRESS NOTES
Internal Medicine Progress Note  Patient: Teena Ocasio  Age/sex: 81 y o  female  Medical Record #: [id number]      ASSESSMENT/PLAN:  Teena Ocasio is seen and examined and mangement for following issues:    1  Cervical cord compression with myelopathy/Mass at [facility]: Likely related to metastatic disease  Currently undergoing XRT for 10 sessions to lumbar and cervical spine  Continue Decadron 4mg Q6 hours  Pain control  Therapy per primary team    2  Metastatic breast cancer: patient with moderate left pleural effusion, multiple hepatic lesions, possible adrenal mass and multiple bony lesions  Overall prognosis poor  Outpatient follow up with primary oncologist  Previously on Xeloda  3  Left pleural effusion: likely malignant  Monitor respiratory status  Currently denies complaints of SOB  4  Hypertension: continue norvasc and monitor blood pressure  5  Hypothyroidism: continue levothyroxine  6  Peripheral neuropathy: continue Lyrica  7  DVT prophylaxis: SQ heparin  8  Hyponatremia:  Mild at 134  Asymptomatic  Will monitor  9  Overactive bladder:  Will start Detrol LA                Subjective: Patient seen and examined  Pt had a BM yesterday after getting a suppository  She is eating well  She has been waking up at night due to urinary frequency and incontinence  She denies any other complaints      ROS:   GI: denies abdominal pain, change bowel habits or reflux symptoms  Neuro: No new neurologic changes  Respiratory: No Cough, SOB  Cardiovascular: No CP, palpitations     Scheduled Meds:    acetaminophen 975 mg Oral Q8H [address]   amLODIPine 5 mg Oral Daily   dexamethasone 4 mg Oral Q6H EDWIN   heparin (porcine) 5,000 Units Subcutaneous Q8H [address]   lactulose 20 g Oral BID   levothyroxine 100 mcg Oral Early Morning   lidocaine 1 patch Transdermal Daily   menthol-zinc oxide 1 application Topical TID   morphine 15 mg Oral BID   pantoprazole 40 mg Oral Early Morning   pregabalin 50 mg Oral TID senna-docusate sodium 1 tablet Oral HS       Labs:       Results from last 7 days  Lab Units 11/06/17  0614 10/31/17  0508   WBC Thousand/uL 6 64 5 98   HEMOGLOBIN g/dL 11 9 12 3   HEMATOCRIT % 35 8 37 1   PLATELETS Thousands/uL 232 289       Results from last 7 days  Lab Units 11/06/17  0614 10/31/17  0508   SODIUM mmol/L 134* 139   POTASSIUM mmol/L 4 8 4 1   CHLORIDE mmol/L 102 106   CO2 mmol/L 25 26   BUN mg/dL 23 12   CREATININE mg/dL 0 48* 0 49*   GLUCOSE RANDOM mg/dL 102 101   CALCIUM mg/dL 7 8* 8 7                  Glucose (mg/dL)   Date Value   11/06/2017 102   10/31/2017 101   10/29/2017 120   10/28/2017 85       Labs reviewed    Physical Examination:  Vitals:   Vitals:    11/04/17 2349 11/05/17 0900 11/05/17 1554 11/06/17 0024   BP: 104/62 126/80 100/64 110/69   Pulse: 82 87 81 86   Resp: 20 18 20 20   Temp: 98 °F (36 7 °C) 97 6 °F (36 4 °C) 97 7 °F (36 5 °C) 98 °F (36 7 °C)   TempSrc: Oral Oral Oral Oral   SpO2: 99% 99% 98% 98%   Weight:       Height:           GEN: NAD  HEENT: NC/AT, EOMI  RESP: CTAB, no R/R/W  CV: +S1 S2, regular rate, no rubs  ABD: soft, NT, ND, normal BS   : No Singh  EXT: No edema  Skin: No rashes  Neuro: AAOx3  Bilat pill rolling tremor  [ X ] Total time spent: 30 Mins and greater than 50% of this time was spent counseling/coordinating care        Megan Eason PA-C  Internal Medicine

## 2017-11-07 PROCEDURE — 97110 THERAPEUTIC EXERCISES: CPT | Performed by: PHYSICAL THERAPIST

## 2017-11-07 PROCEDURE — 97116 GAIT TRAINING THERAPY: CPT | Performed by: PHYSICAL THERAPIST

## 2017-11-07 PROCEDURE — 77387 GUIDANCE FOR RADJ TX DLVR: CPT | Performed by: RADIOLOGY

## 2017-11-07 PROCEDURE — 77412 RADIATION TX DELIVERY LVL 3: CPT | Performed by: RADIOLOGY

## 2017-11-07 PROCEDURE — 97530 THERAPEUTIC ACTIVITIES: CPT | Performed by: PHYSICAL THERAPIST

## 2017-11-07 PROCEDURE — 97535 SELF CARE MNGMENT TRAINING: CPT

## 2017-11-07 PROCEDURE — 97112 NEUROMUSCULAR REEDUCATION: CPT | Performed by: PHYSICAL THERAPIST

## 2017-11-07 RX ADMIN — ONDANSETRON 4 MG: 4 TABLET, ORALLY DISINTEGRATING ORAL at 20:59

## 2017-11-07 RX ADMIN — DEXAMETHASONE 4 MG: 4 TABLET ORAL at 18:17

## 2017-11-07 RX ADMIN — PREGABALIN 50 MG: 50 CAPSULE ORAL at 09:30

## 2017-11-07 RX ADMIN — PANTOPRAZOLE SODIUM 40 MG: 40 TABLET, DELAYED RELEASE ORAL at 05:15

## 2017-11-07 RX ADMIN — AMLODIPINE BESYLATE 5 MG: 5 TABLET ORAL at 09:30

## 2017-11-07 RX ADMIN — ACETAMINOPHEN 975 MG: 325 TABLET, FILM COATED ORAL at 14:15

## 2017-11-07 RX ADMIN — MORPHINE SULFATE 15 MG: 15 TABLET, EXTENDED RELEASE ORAL at 09:26

## 2017-11-07 RX ADMIN — ACETAMINOPHEN 975 MG: 325 TABLET, FILM COATED ORAL at 05:14

## 2017-11-07 RX ADMIN — HEPARIN SODIUM 5000 UNITS: 5000 INJECTION, SOLUTION INTRAVENOUS; SUBCUTANEOUS at 05:15

## 2017-11-07 RX ADMIN — DEXAMETHASONE 4 MG: 4 TABLET ORAL at 11:44

## 2017-11-07 RX ADMIN — ALPRAZOLAM 0.25 MG: 0.25 TABLET ORAL at 09:49

## 2017-11-07 RX ADMIN — DEXAMETHASONE 4 MG: 4 TABLET ORAL at 05:15

## 2017-11-07 RX ADMIN — PREGABALIN 50 MG: 50 CAPSULE ORAL at 21:44

## 2017-11-07 RX ADMIN — ACETAMINOPHEN 975 MG: 325 TABLET, FILM COATED ORAL at 21:44

## 2017-11-07 RX ADMIN — LIDOCAINE 1 PATCH: 50 PATCH CUTANEOUS at 09:27

## 2017-11-07 RX ADMIN — PREGABALIN 50 MG: 50 CAPSULE ORAL at 16:06

## 2017-11-07 RX ADMIN — LEVOTHYROXINE SODIUM 100 MCG: 100 TABLET ORAL at 05:15

## 2017-11-07 RX ADMIN — ANORECTAL OINTMENT 1 APPLICATION: 15.7; .44; 24; 20.6 OINTMENT TOPICAL at 09:30

## 2017-11-07 RX ADMIN — HEPARIN SODIUM 5000 UNITS: 5000 INJECTION, SOLUTION INTRAVENOUS; SUBCUTANEOUS at 14:15

## 2017-11-07 RX ADMIN — DEXAMETHASONE 4 MG: 4 TABLET ORAL at 00:08

## 2017-11-07 RX ADMIN — Medication 1 TABLET: at 21:44

## 2017-11-07 RX ADMIN — HEPARIN SODIUM 5000 UNITS: 5000 INJECTION, SOLUTION INTRAVENOUS; SUBCUTANEOUS at 21:44

## 2017-11-07 RX ADMIN — TOLTERODINE TARTRATE 4 MG: 4 CAPSULE, EXTENDED RELEASE ORAL at 09:30

## 2017-11-07 RX ADMIN — DEXAMETHASONE 4 MG: 4 TABLET ORAL at 23:39

## 2017-11-07 RX ADMIN — MORPHINE SULFATE 15 MG: 15 TABLET, EXTENDED RELEASE ORAL at 18:17

## 2017-11-07 RX ADMIN — OXYCODONE HYDROCHLORIDE 10 MG: 10 TABLET ORAL at 01:15

## 2017-11-07 NOTE — PROGRESS NOTES
11/07/17 0700   Pain Assessment   Pain Assessment No/denies pain   Pain Score No Pain   Restrictions/Precautions   Precautions Bed/chair alarms; Fall Risk   Eating Assessment   Food To Mouth Yes   Able To Cut No  (increased with built up red foam  to trial pre mireille built up)   Positioning Upright;Out of Bed   Intake Mode PO;Self   Findings use of built up handle to fork and to trial prefab built of fork to use in L hand to A with cutting of food  Pt  receptive  Pt  able to carryover malini langleyrosberyl R hand  Eating (FIM) 5 - Patient needs help to open contianers or set up tray   Grooming   Able To Initiate Tasks;Comb/Brush Hair;Wash/Dry Face;Wash/Dry Hands   Grooming (FIM) 5 - [place] sets up supplies or applies device   QI: Shower/Bathe Self   Assistance Needed Physical assistance   Assistance Provided by [place] 50%-74%   Shower/Bathe Self CARE Score 2   Bathing   Assessed Bath Style Shower   Anticipated D/C Bath Style Sponge Bath   Able to [place] Ang No   Able to [employer] Temperature No   Able to Wash/Rinse/Dry (body part) Left Arm;Right Arm;L Upper Leg;L Lower Leg/Foot;R Upper Leg;R Lower Leg/Foot;Chest;Abdomen;Perineal Area; Buttocks   Limitations Noted in Balance; Endurance   Positioning Seated;Standing   Bathing (FIM) 4 - Patient completes 8/10 or 9/10 parts   Tub/Shower Transfer   Limitations Noted In Balance; Endurance   Assessed Shower   Shower Transfer (FIM) 2 - [place] needs to lift or boost to rise AND assist to sit   QI: Upper Body Dressing   Assistance Needed Supervision   Upper Body Dressing CARE Score 4   QI: Lower Body Dressing   Assistance Needed Physical assistance   Assistance Provided by [place] 75% or more   Lower Body Dressing CARE Score 2   QI: Putting On/Taking Off Footwear   Assistance Needed Physical assistance   Assistance Provided by [place] 75% or more   Putting On/Taking Off Footwear CARE Score 2   QI: Picking Up Object   Reason if not Attempted Activity not applicable   Picking Up Object CARE Score 9   Dressing/Undressing Clothing   Remove LB Clothes Socks  (leg over knee)   Don UB Clothes Pullover Shirt   Don LB Clothes Undergarment;Pants;Socks   Limitations Noted In Endurance;Balance; Coordination; Safety;Strength   Positioning Supported Sit;Standing   Findings pt  able to thread BLE through undergarment, able to thread LLE in pants, leg over knee to don socks with forwar chaining  Pt  able to complete unilateral release for CM over hips, but required A 2* decreased balance  UB Dressing (FIM) 5 - Patient requires supervision/monitoring   LB Dressing (FIM) 2 - Patient completes 25-49% of all tasks   QI: Chair/Bed-to-Chair Transfer   Assistance Needed Physical assistance   Assistance Provided by [place] 75% or more   Chair/Bed-to-Chair Transfer CARE Score 2   Transfer Bed/Chair/Wheelchair   Limitations Noted In Balance; Endurance   Sit Pivot Maximum Assist   Sit to Stand Moderate Assist   Stand to Sit Moderate Assist   Bed, Chair, Wheelchair Transfer (FIM) 2 - [place] needs to lift or boost to rise AND assist to sit   QI: [address] Needed Physical assistance   Assistance Provided by [place] 75% or more   Toileting Hygiene CARE Score 2   Toileting   Able to [address] down no, up no  Toileting (FIM) 1 - Patient completes less than 25% of all tasks   QI: Toilet Transfer   Assistance Needed Physical assistance   Assistance Provided by [place] 75% or more   Toilet Transfer CARE Score 2   Toilet Transfer   Surface Assessed Standard Commode   Transfer Technique Sit Pivot;Stand Pivot   Findings sit/stand pivot   Toilet Transfer (FIM) 2 - [place] needs to lift or boost to rise AND assist to sit   Assessment   Treatment Assessment Pt  Participated in OT tx session focusing on ADL, as stated above  Pt  Tolerated shower this a m  Pt  Unaware need to use BSC 2* decreased sensation per pt  Stool noted on w/c pad after txfer from w/c   As result txferd back to w/c then stand pivot txfer to comfort height with grab bar  Pt  Able to complete perineal and buttock hygiene seated on tub bench with weightshift, however, required A for thoroughness  See above for ADL details  Pt  Tolerated session well but required A for LB dressing 2* fatigue and decreased coordination/strength RLE  Edu  Pt  Energy conservation tech and delegating parts of dressing to son prn to conserve energy for safe txfers and leisure tasks  Pt  Receptive  Pt  Nancy Weiss  Limited WBing to RLE when in stance  Edu  Pt  Compensatory use of vision to guide RLE for placement when in stance to provide optimal balance  With cues pt  Demo  Increased carryover by end of tx session  Also issued built up fork, as stated above  To continue OT services per POC with a focus on standing balance/endurance, L hand strength, BUE strength, core strength, safety awareness, family training, assessment of IADL tasks  Prognosis Fair   Problem List Decreased strength;Decreased range of motion;Decreased endurance; Impaired balance;Decreased mobility; Decreased safety awareness   Plan   Treatment/Interventions ADL retraining;Functional transfer training; Therapeutic exercise; Endurance training;Patient/family training;Equipment eval/education   Progress Progressing toward goals   OT Therapy Minutes   OT Time In 0700   OT Time Out 0830   OT Total Time (minutes) 90   OT Mode of treatment - Individual (minutes) 90   OT Mode of treatment - Concurrent (minutes) 0   OT Mode of treatment - Group (minutes) 0   OT Mode of treatment - Co-treat (minutes) 0   OT Mode of Teatment - Total time(minutes) 90 minutes   Therapy Time missed   Time missed?  No

## 2017-11-07 NOTE — PROGRESS NOTES
Physical Medicine and Rehabilitation Progress Note:  Spinal Cord Dysfunction:  Non-Traumatic:  04 130 Other Non-Traumatic Cord Compression  Makayla Womack 81 y o  female MRN: [id number]  Unit/Bed#: -01 Encounter: [id number]    Assessment:   Betty Benavides is a 81 y o  female who  has a past medical history of Arthritis; Asthma; Breast cancer ([address] ); Disease of thyroid gland; and Hypertension  and presented to the [facility] after outpatient MRI demonstrated cord compression  Palliative treatment to spine started  Surgical intervention not recommended due to extent of metastasis  She was [facility] to [facility] on [facility]03[facility]17  Subjective: No acute events overnight  Isac Alpers was seen while in her room  Denies any CP or SOB  Notes anxiety with what has been happening with her decline in her function; I let her know that she has anxiolytics on board, as well as Dr Lamont Medina  No other complaints otherwise  ROS: A 10-point ROS was performed  Negative except as listed above  Restrictions include:  Fall precautions    Disposition: TBD    Plan:  # Cord compression, myelopathy  - Acute comprehensive interdisciplinary inpatient rehabilitation including PT, OT, SLP, RN, CM, SW, dietary, psychology, etc   - Appreciate Internal Medicine following - Dr Lucia Duarte service      - secondary to spinal mets  - undergoing XRT (total of 10 sessions)  - Continue steroids     # Metastatic breast CA  - f/u with oncology as outpatient  - noted to have edema to LUE, will require compression garment     # Neuropathy  - continue lyrica     # Anemia (improved)  - Likely reactive  - continue monitoring CBC       Results from last 7 days  Lab Units 11/06/17  0614   HEMOGLOBIN g/dL 11 9       # HTN  - Continue Amlodipine     Temp:  [97 6 °F (36 4 °C)-98 2 °F (36 8 °C)] 97 6 °F (36 4 °C)  HR:  [79-83] 82  Resp:  [18-20] 20  BP: (111-117)/(71-75) 117/75    # Hypothyroid  - Continue levothyroxine      # Pain  - Continue tylenol PRN, for max of 3gm daily     - Continue oxycodone   - Continue MS Contin  - This patient record was searched in the [place] PDMP and the risks/benefits of prescribing this patient medications with high abuse/dependence potential was assessed and determined to be medically appropriate at this time  I could not find any prescriptions in [place], [place]  Patient reports she was getting oxycodone 10mg TID (chart review notes she was taking 10mg Q6h PRN), and MS contin 15mg BID      # Rehab Psych  - Continue alprazolam PRN  - Neuropsych consult, appreciate recs     # FENA/prophy  - Diet: cardiac  SLP and nutrition to monitor and adjust as necessary   - DVT prophy: Sequential compression device (Venodyne)  and Heparin  - GI ppx: Pantaprazole  - Bowel: colace , dulcolax suppository  and miralax PRN  - Nausea: Zofran  - Supplements: None  - Sleep: None      CODE: Level 1: Full Code    Objective:  Nursing staff reporting: no problems    Functional Update:  Physical Therapy Occupational Therapy Speech Therapy   Weight Bearing Status: Full Weight Bearing  Transfers: Minimal Assistance  Bed Mobility: Supervision  Amulation Distance (ft): 10 feet  Ambulation: Moderate Assistance, Assist of 2  Assistive Device for Ambulation:  (parallel bars)  Wheelchair Mobility Distance: 150 ft  Wheelchair Mobility: Minimal Assistance, Supervision  Discharge Recommendations: Home with:  [address] with[de-identified] 24 Hour Supervision, 24 Hour Assisteance, Family Support, First Floor Setup, Home Physical Therapy   Eating: Supervision  Grooming: Supervision  Bathing: Moderate Assistance  Bathing: Moderate Assistance  Upper Body Dressing: Supervision  Lower Body Dressing: Total Assistance  Toileting: Maximum Assistance  Tub/Shower Transfer: Total Assistance  Toilet Transfer:  Total Assistance  Cognition: Within Defined Limits  Orientation: Person, Place, Time, Situation               Allergies and Medications per EMR    Physical Exam:  General: alert, no apparent distress, cooperative and comfortable  HENMT: Head: Normal, normocephalic, atraumatic  Eye: Normal external eye, conjunctiva, lids   Ears: Normal external ears  Nose: Normal external nose, mucus membranes  Pulmonary: chest expansion normal, no retractions, no accessory muscle usage  Abdomen: soft, nontender, nondistended, no masses or organomegaly  Skin/Extremity: no rashes, no erythema, LUE edema   Neurologic: Awake alert orientedx3  Psych: normal mood, behavior, speech, dress, and thought processes    Diagnostic Studies:  Reviewed, no new imaging    Vitals: Reviewed   Temp:  [97 6 °F (36 4 °C)-98 2 °F (36 8 °C)] 97 6 °F (36 4 °C)  HR:  [79-83] 82  Resp:  [18-20] 20  BP: (111-117)/(71-75) 117/75   Intake/Output Summary (Last 24 hours) at 11/07/17 1300  Last data filed at 11/07/17 4884   Gross per 24 hour   Intake              300 ml   Output              550 ml   Net             -250 ml        Laboratory: Reviewed  Lab Results   Component Value Date    HGB 11 9 11/06/2017    HCT 35 8 11/06/2017    WBC 6 64 11/06/2017     Lab Results   Component Value Date    BUN 23 11/06/2017     (L) 11/06/2017    K 4 8 11/06/2017     11/06/2017    GLUCOSE 102 11/06/2017    CREATININE 0 48 (L) 11/06/2017     No results found for: PROTIME, INR     Patient Active Problem List   Diagnosis    Cervical cord compression with myelopathy [facility]    Breast cancer ([facility] )    Essential hypertension    Hypothyroidism       ** Please Note: Fluency Direct voice to text software may have been used in the creation of this document  **    **This patient was discussed by the Interdisciplinary Team in weekly case conference today  The care of the patient was extensively discussed with all care providers and an appropriate rehabilitation plan was formulated unique for this patient  Barriers were identified preventing progression of therapy and appropriate interventions were discussed with each discipline   Please see the team note for input from all disciplines regarding barriers, intervention, and discharge planning  [ x ] Total time spent: 45 Mins, and greater than 50% of this time was spent counseling/coordinating care

## 2017-11-07 NOTE — TEAM CONFERENCE
Acute RehabilitationTeam Conference Note  Date: 11/7/2017   Time: 11:00 AM       Patient Name:  Rian Jeffers       Medical Record Number: [id number]   YOB: 1945  Sex: Female          Room/Bed:  [facility] 457/[facility] 457-01  Payor Info:  Payor: Diamante Malik / Plan: MEDICARE A AND B / Product Type: Medicare A & B Fee for Service /      Admitting Diagnosis: Cervical cord compression with myelopathy ([facility] [address] ) [G95 20]   Admit Date/Time:  11/2/2017  4:59 PM  Admission Comments: No comment available     Primary Diagnosis:  Cervical cord compression with myelopathy (HCC)  Principal Problem: Cervical cord compression with myelopathy ([facility] [place] [address] )    Patient Active Problem List    Diagnosis Date Noted    Hypothyroidism 10/28/2017    Cervical cord compression with myelopathy ([facility] [place] [address] ) 10/27/2017    Breast cancer ([facility] [place] [address] ) 10/27/2017    Essential hypertension 10/27/2017       Physical Therapy:    Weight Bearing Status: Full Weight Bearing  Transfers: Minimal Assistance  Bed Mobility: Supervision  Amulation Distance (ft): 10 feet  Ambulation: Moderate Assistance, Assist of 2  Assistive Device for Ambulation:  (parallel bars)  Wheelchair Mobility Distance: 150 ft  Wheelchair Mobility: Minimal Assistance, Supervision  Discharge Recommendations: Home with:  [address] [facility] Kathrine Corraladen with[de-identified] 24 Hour Supervision, 24 Hour Assisteance, Family Support, First Floor Setup, Home Physical Therapy    Pt presents with RLE coordination deficits, dec motor control, dec balance, dec activity tolerance, pressure ulcers on buttocks, LUE edema (limb alert)  Pt lives with son who will be able to provide 24/7 S and assist  Currently recommending w/c as primary means of mobility 2* BLE buckling during ambulation in parallel bars  Pt showing improvements with sit pivots; can perform @ min A/CG level  Pt has difficulty with w/c mobility with obstacles or turns  Barriers are 3 JAN (although son will be able to assist)   Pt will cont to benefit from skilled PT to maximize I with sit pivots and w/c mobility and to dec overall burden of care  Occupational Therapy:  Eating: Supervision  Grooming: Supervision  Bathing: Moderate Assistance  Bathing: Moderate Assistance  Upper Body Dressing: Supervision  Lower Body Dressing: Total Assistance  Toileting: Maximum Assistance  Tub/Shower Transfer: Total Assistance  Toilet Transfer: Total Assistance  Cognition: Within Defined Limits  Orientation: Person, Place, Time, Situation  Discharge Recommendations: Home with:  [address] with[de-identified] 24 Hour Supervision, 24 Hour Assistance, Family Support       Pt  Presents with decreased endurance, decreased standing balance, decreased coordination and strength BLE, increased pain, decreased core and UB strength  Pt  Motivated and actively participating in therapy  Pt  Has son whom is primary caregiver and will continue to A upon d/c  To continue OT services per POC to address the later and implement family training for safe d/c home  Speech Therapy:           No notes on file    Nursing Notes:  Appetite: Good  Diet Type: Cardiac                           Type of Wound (LDA): Pressure ulcer  Pressure Ulcer 10/27/17 Buttocks Left (Active)   Staging Stage II 11/7/2017 10:35 AM   Wound Description Clean;Dry;Pink 11/7/2017 10:35 AM   Mehreen-wound Assessment Clean;Dry; Intact 11/7/2017 10:35 AM   Shape round  11/7/2017 10:35 AM   Wound Length (cm) 0 6 cm 11/7/2017 10:35 AM   Wound Width (cm) 0 5 cm 11/7/2017 10:35 AM   Wound Depth (cm) 0 1 11/7/2017 10:35 AM   Calculated Wound Area (cm^2) 0 3 cm^2 11/7/2017 10:35 AM   Calculated Wound Volume (cm^3) 0 03 cm^3 11/7/2017 10:35 AM   Change in Wound Size % 0 11/7/2017 10:35 AM   Drainage Amount None 11/7/2017 10:35 AM   Treatment Cleansed;Elevated with pillow(s); Offload;Softcare cushion;Turn & reposition 11/7/2017 10:35 AM   Dressing Moisture barrier 11/7/2017 10:35 AM   Wound packed?  No 11/7/2017 10:35 AM   Packing- # removed 0 11/7/2017 10:35 AM   Packing- # inserted 0 11/7/2017 10:35 AM   Patient Tolerance Tolerated well 11/7/2017 10:35 AM   Dressing Status Open to air 11/7/2017 10:35 AM       Pressure Ulcer 10/27/17 Sacrum Mid (Active)   Staging Stage II 11/7/2017 10:35 AM   Wound Description Clean;Dry;Pink 11/7/2017 10:35 AM   Mehreen-wound Assessment Clean;Dry; Intact 11/7/2017 10:35 AM   Shape round  11/7/2017 10:35 AM   Wound Length (cm) 0 6 cm 11/7/2017 10:35 AM   Wound Width (cm) 0 3 cm 11/7/2017 10:35 AM   Wound Depth (cm) 0 1 11/7/2017 10:35 AM   Calculated Wound Area (cm^2) 0 18 cm^2 11/7/2017 10:35 AM   Calculated Wound Volume (cm^3) 0 02 cm^3 11/7/2017 10:35 AM   Change in Wound Size % 33 33 11/7/2017 10:35 AM   Drainage Amount None 11/7/2017 10:35 AM   Treatment Cleansed;Elevated with pillow(s); Offload;Softcare cushion;Turn & reposition 11/7/2017 10:35 AM   Dressing Moisture barrier 11/7/2017 10:35 AM   Wound packed? No 11/7/2017 10:35 AM   Packing- # removed 0 11/7/2017 10:35 AM   Packing- # inserted 0 11/7/2017 10:35 AM   Patient Tolerance Tolerated well 11/7/2017 10:35 AM   Dressing Status Open to air 11/7/2017 10:35 AM                                         Pain Location: Back  Pain Orientation: Lower  Pain Score: 4                       Hospital Pain Intervention(s): Medication (See MAR), Emotional support          Pt admitted on 11/2 on ARC for Cervical cord compression with myelopathy/Mass at [facility]: Likely related to metastatic disease  Currently undergoing XRT for 10 sessions to lumbar and cervical spine  Started 11/3  Continue Decadron 4mg Q6 hours   Pt has Hx:  lumpectomy-   Metastatic breast cancer: patient with moderate left pleural effusion, multiple hepatic lesions, possible adrenal mass and multiple bony lesions  Overall prognosis poor  Outpatient follow up with primary oncologist  Previously on Xeloda   Pt has Right sided weakness and Numbness/tingling BUE and BLE      This week Pt to focus on safety precautions regarding safe transfers and building endurance  Pt will work on increasing independence with ADLS  nursing to monitor vitals, pain, nutrition, safety and  skin integrity  Case Management:     Discharge Planning  Living Arrangements: Children  Support Systems: Children, [employer] community, Friends/neighbors  Assistance Needed: PT/OT/ST  Type of Current Residence: Private residence  Current Home Care Services: No  Pt is participating with t herapy and plans on return home  Pt has been educated on potential recommendations for dc  Following to assist w/dc needs  Is the patient actively participating in therapies? yes  List any modifications to the treatment plan:     Barriers Interventions   Coordination of right lower extremity Therapy exercises   Fearful  neuropsych eval   Fatigue, endurance Energy conservation education   Leg buckling Therapy exercises, use of device   Decreased grasp and tremor on the left hand Therapy exercise     Is the patient making expected progress toward goals?  yes  List any update or changes to goals:     Medical Goals: Patient will be medically stable for discharge to [facility] upon completion of rehab program and Patient will be able to manage medical conditions and comorbid conditions with medications and follow up upon completion of rehab program    Weekly Team Goals:   Rehab Team Goals  ADL Team Goal: Patient will require assist with ADLs with least restrictive device upon completion of rehab program  Bowel/Bladder Team Goal: Patient will return to premorbid level for bladder/bowel management upon completion of rehab program  Transfer Team Goal: Patient will be independent with transfers with least restrictive device upon completion of rehab program  Locomotion Team Goal: Patient will be independent with locomotion with least restrictive device upon completion of rehab program  Cognitive Team Goal: Patient will be independent for basic and complex tasks upon completion of rehab program    Discussion: in attendance to review pts progress is rn pt ot slp cm and physician  Pt is participating with therapy and making small gains  Pt has has decreased endurance and fatigue due to radiation tx  Pt has knee buckling and requires blocking and use of device with ambulation  Pt is contact guard for sit pivots but when fatigued is mod a  Son is able to assist on dc, but goals are to be mod I in w/c and sit pivots, but supervision for mobility and adls       Anticipated Discharge Date:  reteam

## 2017-11-07 NOTE — WOUND OSTOMY CARE
Progress Note - Wound   Redge Clifford Womack 81 y o  female MRN: [id number]  Unit/Bed#: -01 Encounter: [id number]      Assessment:  Patient seen for weekly follow up visit with wound care  Patient seen Bell Cespedes in wheelchair sitting on ROHO cushion prior PT session  Patient is agreeable and cooperative with assessment  Minimal assist of one with rolling walker  Patient is continent of bowel and bladder  B/l heels are intact with no redness noted  Nutrition is following this patient  Measurements to L and sacrum pressure injuries have decreased since last visit  Stage 2 pressure injury noted to L buttock  Wound bed is 100% pink and dry appearing  Edge is dry, intact, attached with no maceration noted  Mehreen-wound is intact with no redness, induration or fluctuance  Stage 2 pressure injury to sacrum  Wound bed is 100% pink and dry appearing  Edge is dry, intact, attached with no maceration noted  Mehreen-wound is intact with no redness, induration or fluctuance  Area of healed scarring noted to R buttock  No open wounds noted  No redness noted or temperature changes noted to the skin  Reminded patient to weight shift, turn and reposition / stand to offload pressure when in and out of bed  Patient verbalized understanding of education and plan  Patient denies pain to these wounds  Discussed case with primary RN, who states that patient is continent of bowel and bladder  Wound beds appear dry, will recommend to change calazime to hydraguard for added moisture to these wounds  See flow sheets for more detailed assessment findings  Wound care will follow along weekly  Plan:   1  Apply hydraguard to b/l heels, buttocks and sacrum TID and PRN for prevention and protection  2  Apply skin nourishing cream to the entire skin daily for moisture   3  Elevate heels off of bed with pillows to offload   4  Turn and reposition patient every 2 hours   5  Soft care cushion to chair when OOB  6   Wound care will follow along with patient weekly, please call with questions or concerns     Objective:    Vitals: Blood pressure 117/75, pulse 82, temperature 97 6 °F (36 4 °C), temperature source Oral, resp  rate 20, height 5' 2" (1 575 m), weight 68 kg (149 lb 14 4 oz), SpO2 99 %  ,Body mass index is 27 42 kg/m²  Pressure Ulcer 10/27/17 Buttocks Left (Active)   Staging Stage II 11/7/2017 10:35 AM   Wound Description Clean;Dry;Pink 11/7/2017 10:35 AM   Mehreen-wound Assessment Clean;Dry; Intact 11/7/2017 10:35 AM   Shape round  11/7/2017 10:35 AM   Wound Length (cm) 0 6 cm 11/7/2017 10:35 AM   Wound Width (cm) 0 5 cm 11/7/2017 10:35 AM   Wound Depth (cm) 0 1 11/7/2017 10:35 AM   Calculated Wound Area (cm^2) 0 3 cm^2 11/7/2017 10:35 AM   Calculated Wound Volume (cm^3) 0 03 cm^3 11/7/2017 10:35 AM   Change in Wound Size % 0 11/7/2017 10:35 AM   Drainage Amount None 11/7/2017 10:35 AM   Treatment Cleansed;Elevated with pillow(s); Offload;Softcare cushion;Turn & reposition 11/7/2017 10:35 AM   Dressing Moisture barrier 11/7/2017 10:35 AM   Wound packed? No 11/7/2017 10:35 AM   Packing- # removed 0 11/7/2017 10:35 AM   Packing- # inserted 0 11/7/2017 10:35 AM   Patient Tolerance Tolerated well 11/7/2017 10:35 AM   Dressing Status Open to air 11/7/2017 10:35 AM       Pressure Ulcer 10/27/17 Sacrum Mid (Active)   Staging Stage II 11/7/2017 10:35 AM   Wound Description Clean;Dry;Pink 11/7/2017 10:35 AM   Mehreen-wound Assessment Clean;Dry; Intact 11/7/2017 10:35 AM   Shape round  11/7/2017 10:35 AM   Wound Length (cm) 0 6 cm 11/7/2017 10:35 AM   Wound Width (cm) 0 3 cm 11/7/2017 10:35 AM   Wound Depth (cm) 0 1 11/7/2017 10:35 AM   Calculated Wound Area (cm^2) 0 18 cm^2 11/7/2017 10:35 AM   Calculated Wound Volume (cm^3) 0 02 cm^3 11/7/2017 10:35 AM   Change in Wound Size % 33 33 11/7/2017 10:35 AM   Drainage Amount None 11/7/2017 10:35 AM   Treatment Cleansed;Elevated with pillow(s); Offload;Softcare cushion;Turn & reposition 11/7/2017 10:35 AM   Dressing Moisture barrier 11/7/2017 10:35 AM   Wound packed? No 11/7/2017 10:35 AM   Packing- # removed 0 11/7/2017 10:35 AM   Packing- # inserted 0 11/7/2017 10:35 AM   Patient Tolerance Tolerated well 11/7/2017 10:35 AM   Dressing Status Open to air 11/7/2017 10:35 AM         Recommendations written as orders    Travis Min RN BSN

## 2017-11-07 NOTE — PCC NURSING
Pt admitted on 11/2 on ARC for Cervical cord compression with myelopathy/Mass at [facility]: Likely related to metastatic disease  Currently undergoing XRT for 10 sessions to lumbar and cervical spine  Started 11/3  Continue Decadron 4mg Q6 hours Pain control with ms contin and tylenol atc and oxycodone prn  Pt is occasionally inc of bladder  Pt has 2 stage 2 decubs on sacrum/ buttocks  Pt has Hx:  lumpectomy-   Metastatic breast cancer: patient with moderate left pleural effusion, multiple hepatic lesions, possible adrenal mass and multiple bony lesions  Overall prognosis poor     Pt has Right sided weakness and Numbness/tingling BUE and BLE and hand tremors  This week we will encourage independence with ADL's  We will monitor vital signs, lab results and adequate pain control  We will monitor skin integrity and provide frequent repositioning  We will increase safety awareness with transfers and keep pt free from falls

## 2017-11-07 NOTE — PROGRESS NOTES
Internal Medicine Progress Note  Patient: Gal Bunch  Age/sex: 81 y o  female  Medical Record #: [id number]      ASSESSMENT/PLAN:  Gal Bunch is seen and examined and mangement for following issues:    1  Cervical cord compression with myelopathy/Mass at [facility]: Likely related to metastatic disease  Currently undergoing XRT for 10 sessions to lumbar and cervical spine  Continue Decadron 4mg Q6 hours  Pain control  Therapy per primary team    2  Metastatic breast cancer: patient with moderate left pleural effusion, multiple hepatic lesions, possible adrenal mass and multiple bony lesions  Overall prognosis poor  Outpatient follow up with primary oncologist  Previously on Xeloda  3  Left pleural effusion: likely malignant  Monitor respiratory status  Currently denies complaints of SOB  4  Hypertension: continue norvasc and monitor blood pressure  5  Hypothyroidism: continue levothyroxine  6  Peripheral neuropathy: continue Lyrica  7  DVT prophylaxis: SQ heparin  8  Hyponatremia:  Mild at 134  Asymptomatic  Will monitor  9  Overactive bladder:  Detrol LA started                Subjective: Patient seen and examined  Pt reports pain control adequate  Bladder incontinence continues to be an issue  She denies any other complaints      ROS:   GI: denies abdominal pain, change bowel habits or reflux symptoms  Neuro: No new neurologic changes  Respiratory: No Cough, SOB  Cardiovascular: No CP, palpitations     Scheduled Meds:    acetaminophen 975 mg Oral Q8H [address]   amLODIPine 5 mg Oral Daily   dexamethasone 4 mg Oral Q6H EDWIN   heparin (porcine) 5,000 Units Subcutaneous Q8H [address]   lactulose 20 g Oral BID   levothyroxine 100 mcg Oral Early Morning   lidocaine 1 patch Transdermal Daily   menthol-zinc oxide 1 application Topical TID   morphine 15 mg Oral BID   pantoprazole 40 mg Oral Early Morning   pregabalin 50 mg Oral TID   senna-docusate sodium 1 tablet Oral HS   tolterodine 4 mg Oral Daily Labs:       Results from last 7 days  Lab Units 11/06/17  0614   WBC Thousand/uL 6 64   HEMOGLOBIN g/dL 11 9   HEMATOCRIT % 35 8   PLATELETS Thousands/uL 232       Results from last 7 days  Lab Units 11/06/17 0614   SODIUM mmol/L 134*   POTASSIUM mmol/L 4 8   CHLORIDE mmol/L 102   CO2 mmol/L 25   BUN mg/dL 23   CREATININE mg/dL 0 48*   GLUCOSE RANDOM mg/dL 102   CALCIUM mg/dL 7 8*                  Glucose (mg/dL)   Date Value   11/06/2017 102   10/31/2017 101   10/29/2017 120   10/28/2017 85       Labs reviewed    Physical Examination:  Vitals:   Vitals:    11/06/17 0800 11/06/17 0801 11/06/17 1500 11/07/17 0046   BP: 110/76 108/69 111/71 115/72   Pulse: 80 78 79 83   Resp:  18 18 20   Temp:  97 8 °F (36 6 °C) 98 2 °F (36 8 °C) 97 8 °F (36 6 °C)   TempSrc:  Oral Oral Oral   SpO2:  97% 96% 98%   Weight:       Height:           GEN: NAD  HEENT: NC/AT, EOMI  RESP: CTAB, no R/R/W  CV: +S1 S2, regular rate, no rubs  ABD: soft, NT, ND, normal BS   : No Singh  EXT: No edema  Skin: No rashes  Neuro: AAOx3  Bilat pill rolling tremor  [ X ] Total time spent: 30 Mins and greater than 50% of this time was spent counseling/coordinating care        Collette Littles, PA-C  Internal Medicine

## 2017-11-07 NOTE — SOCIAL WORK
Met w/pt while on the stretcher heading to radiation, reviewed team update and informed of recommendations to reteam to allow pt to make  More progress  Pt is in agreemt  Following for assist w/dc planning needs

## 2017-11-07 NOTE — PROGRESS NOTES
11/07/17 1430   Pain Assessment   Pain Assessment 0-10   Pain Score 4   Pain Type Acute pain   Pain Location [facility] Pain Intervention(s) Medication (See MAR); Repositioned; Rest   Response to Interventions pt reports 4/10 is the lowest her pain level goes and it is tolerable   Restrictions/Precautions   Precautions Fall Risk;Limb alert;Pressure Ulcer   Cognition   Arousal/Participation Cooperative   Subjective   Subjective Pt reports she is feeling ok and is ready for therapy   QI: Sit to [address] Provided by [place] No physical assistance   Sit to Lying CARE Score 4   QI: Lying to Sitting on Side of Bed   Assistance Needed Supervision   Assistance Provided by [place] No physical assistance   Lying to Sitting on Side of Bed CARE Score 4   QI: Sit to Stand   Assistance Needed Incidental touching   Assistance Provided by [place] No physical assistance   Sit to Stand CARE Score 4   QI: Chair/Bed-to-Chair Transfer   Assistance Needed Physical assistance   Assistance Provided by [place] 25%-49%   Chair/Bed-to-Chair Transfer CARE Score 3   Transfer Bed/Chair/Wheelchair   Limitations Noted In Balance; Coordination; Endurance;LE Strength   Adaptive Equipment Roller Walker   Sit Pivot Minimal Assist   Sit to [employer]   Stand to Sit Minimal Assist   Supine to Sit Supervision   Sit to Supine Supervision   Bed, Chair, Wheelchair Transfer (FIM) 3 - [place] needs to lift, boost or assist to stand OR sit   QI: Walk 10 Feet   Assistance Needed Physical assistance   Assistance Provided by [place] 50%-74%   Walk 10 Feet CARE Score 2   Ambulation   Does the patient walk? 2  Yes   Primary Discharge Mode of Locomotion Wheelchair   Walk Assist Level Moderate Assist   Gait Pattern Ataxic;Decreased foot clearance; Inconsistant Barb; Forward Flexion; Improper weight shift; Step to   Assist Device Parallel Bars   Distance Walked (feet) 10 ft  (x4)   Limitations Noted In Balance; Coordination; Endurance; Heel Strike;Speed;Strength;Swing;Sequencing   Walking (FIM) 1 - Patient ambulates less than 49 feet regardless of assist/device/set up   QI: Wheel 50 Feet with [facility] Provided by [place] No physical assistance   Wheel 50 Feet with Two Turns CARE Score 4   QI: Wheel 150 [address] Provided by [place] No physical assistance   Wheel 150 Feet CARE Score 4   Wheelchair mobility   QI: Does the patient use a wheelchair? 1  Yes   QI: Indicate the type of wheelchair 1  Manual   Wheelchair Assist Level Supervision   Method Right upper extremity; Left upper extremity   Needs Assist With Remove Leg Rest;Replace Leg Rest;Remove armrests;Replace armrests; Locking Brakes   Distance Level Surface (feet) 150 ft   Findings dec speed    Wheelchair (FIM) 5 - Patient requires supervision/monitoring AND wheels distance 150 feet or more, no rest   QI: 1 Step (Curb)   Assistance Needed Physical assistance   Assistance Provided by [place] Total assistance   1 Step (Curb) CARE Score 1   Stairs   Type Stairs   # of Steps 2   Assist Devices Bilateral Rail   Findings ascend forward, descend backwards; max A x2; pt required assist positioning RLE on step 2* dec sensation/dec coordination   Stairs (FIM) 1 - Patient requires assist of two people   Therapeutic Interventions   Other Focused on w/c mobility, stairs, gait training in parallel bars, and practicing sit pivot transfers w/c <> mat    Assessment   Treatment Assessment PT session focused on overall functional mobility  Assessed ability to perform stairs; pt required max A x2; unable to place R foot in correct spot on steps (required manual assist from therapist); R knee cont to buckle as well  Trialed pt with R AFO in parallel bars to inc knee stability and improve heel strike; R knee cont to buckle; will cont to assess for appropriate bracing  LLE stability has improved   Pt also showing improvements with w/c mobility (S level) and sit pivot transfers  Pt will cont to benefit from skilled PT to progress overall functional mobility  Barriers to Discharge Inaccessible home environment   PT Barriers   Physical Impairment Decreased strength;Decreased endurance; Impaired balance;Decreased mobility; Decreased coordination;Decreased safety awareness;Pain;Decreased skin integrity   Functional Limitation Car transfers; Ramp negotiation;Stair negotiation;Standing;Transfers; Walking; Wheelchair management   Plan   Treatment/Interventions Functional transfer training;LE strengthening/ROM; Elevations; Therapeutic exercise; Endurance training;Patient/family training;Equipment eval/education; Bed mobility;Gait training   Progress Progressing toward goals   Recommendation   Recommendation Home with family support;24 hour supervision/assist;Home PT   Equipment Recommended Wheelchair   PT Therapy Minutes   PT Time In 1430   PT Time Out 1520   PT Total Time (minutes) 50   PT Mode of treatment - Individual (minutes) 50   PT Mode of treatment - Concurrent (minutes) 0   PT Mode of treatment - Group (minutes) 0   PT Mode of treatment - Co-treat (minutes) 0   PT Mode of Teatment - Total time(minutes) 50 minutes   Therapy Time missed   Time missed?  No

## 2017-11-07 NOTE — PCC CARE MANAGEMENT
Pt is participating with rosalinda mann and plans on return home  Pt has been educated on potential recommendations for dc  Following to assist w/dc needs

## 2017-11-07 NOTE — PROGRESS NOTES
11/07/17 0700   Pain Assessment   Pain Assessment No/denies pain   Pain Score No Pain   Restrictions/Precautions   Precautions Bed/chair alarms; Fall Risk   Eating Assessment   Food To Mouth Yes   Able To Cut No  (increased with built up red foam  to trial pre mireille built up)   Positioning Upright;Out of Bed   Intake Mode PO;Self   Findings use of built up handle to fork and to trial prefab built of fork to use in L hand to A with cutting of food  Pt  receptive  Pt  able to carryover malini langleyrosberyl R hand  Eating (FIM) 5 - Patient needs help to open contianers or set up tray   Grooming   Able To Initiate Tasks;Comb/Brush Hair;Wash/Dry Face;Wash/Dry Hands   Grooming (FIM) 5 - [place] sets up supplies or applies device   QI: Shower/Bathe Self   Assistance Needed Physical assistance   Assistance Provided by [place] 50%-74%   Shower/Bathe Self CARE Score 2   Bathing   Assessed Bath Style Shower   Anticipated D/C Bath Style Sponge Bath   Able to [place] No   Able to [employer] Temperature No   Able to Wash/Rinse/Dry (body part) Left Arm;Right Arm;L Upper Leg;L Lower Leg/Foot;R Upper Leg;R Lower Leg/Foot;Chest;Abdomen;Perineal Area; Buttocks   Limitations Noted in Balance; Endurance   Positioning Seated;Standing   Bathing (FIM) 4 - Patient completes 8/10 or 9/10 parts   Tub/Shower Transfer   Limitations Noted In Balance; Endurance   Assessed Shower   Shower Transfer (FIM) 2 - [place] needs to lift or boost to rise AND assist to sit   QI: Upper Body Dressing   Assistance Needed Supervision   Upper Body Dressing CARE Score 4   QI: Lower Body Dressing   Assistance Needed Physical assistance   Assistance Provided by [place] 75% or more   Lower Body Dressing CARE Score 2   QI: Putting On/Taking Off Footwear   Assistance Needed Physical assistance   Assistance Provided by [place] 75% or more   Putting On/Taking Off Footwear CARE Score 2   QI: Picking Up Object   Reason if not Attempted Activity not applicable   Picking Up Object CARE Score 9   Dressing/Undressing Clothing   Remove LB Clothes Socks  (leg over knee)   Don UB Clothes Pullover Shirt   Don LB Clothes Undergarment;Pants;Socks   Limitations Noted In Endurance;Balance; Coordination; Safety;Strength   Positioning Supported Sit;Standing   Findings pt  able to thread BLE through undergarment, able to thread LLE in pants, leg over knee to don socks with forwar chaining  Pt  able to complete unilateral release for CM over hips, but required A 2* decreased balance  UB Dressing (FIM) 5 - Patient requires supervision/monitoring   LB Dressing (FIM) 2 - Patient completes 25-49% of all tasks   QI: Chair/Bed-to-Chair Transfer   Assistance Needed Physical assistance   Assistance Provided by [place] 75% or more   Chair/Bed-to-Chair Transfer CARE Score 2   Transfer Bed/Chair/Wheelchair   Limitations Noted In Balance; Endurance   Sit Pivot Maximum Assist   Sit to Stand Moderate Assist   Stand to Sit Moderate Assist   Bed, Chair, Wheelchair Transfer (FIM) 2 - [place] needs to lift or boost to rise AND assist to sit   QI: [address] Needed Physical assistance   Assistance Provided by [place] 75% or more   Toileting Hygiene CARE Score 2   Toileting   Able to [address] down no, up no  Toileting (FIM) 1 - Patient completes less than 25% of all tasks   QI: Toilet Transfer   Assistance Needed Physical assistance   Assistance Provided by [place] 75% or more   Toilet Transfer CARE Score 2   Toilet Transfer   Surface Assessed Standard Commode   Transfer Technique Sit Pivot;Stand Pivot   Findings sit/stand pivot   Toilet Transfer (FIM) 2 - [place] needs to lift or boost to rise AND assist to sit   Assessment   Treatment Assessment Pt  Participated in OT tx session focusing on ADL, as stated above  Pt  Tolerated shower this a m  Pt  Unaware need to use BSC 2* decreased sensation per pt  Stool noted on w/c pad after txfer from w/c   As result txferd back to w/c then stand pivot txfer to comfort height with grab bar  Pt  Able to complete perineal and buttock hygiene seated on tub bench with weightshift, however, required A for thoroughness  See above for ADL details  Pt  Tolerated session well but required A for LB dressing 2* fatigue and decreased coordination/strength RLE  Edu  Pt  Energy conservation tech and delegating parts of dressing to son prn to conserve energy for safe txfers and leisure tasks  Pt  Receptive  Pt  Nyra Lack  Limited WBing to RLE when in stance  Edu  Pt  Compensatory use of vision to guide RLE for placement when in stance to provide optimal balance  With cues pt  Demo  Increased carryover by end of tx session  Also issued built up fork, as stated above  To continue OT services per POC with a focus on standing balance/endurance, L hand strength, BUE strength, core strength, safety awareness, family training, assessment of IADL tasks  Prognosis Fair   Problem List Decreased strength;Decreased range of motion;Decreased endurance; Impaired balance;Decreased mobility; Decreased safety awareness   Plan   Treatment/Interventions ADL retraining;Functional transfer training; Therapeutic exercise; Endurance training;Patient/family training;Equipment eval/education   Progress Progressing toward goals   Recommendation   OT Discharge Recommendation Home OT   OT Therapy Minutes   OT Time In 0700   OT Time Out 0830   OT Total Time (minutes) 90   OT Mode of treatment - Individual (minutes) 90   OT Mode of treatment - Concurrent (minutes) 0   OT Mode of treatment - Group (minutes) 0   OT Mode of treatment - Co-treat (minutes) 0   OT Mode of Teatment - Total time(minutes) 90 minutes   Therapy Time missed   Time missed?  No

## 2017-11-07 NOTE — PROGRESS NOTES
11/07/17 0950   Pain Assessment   Pain Assessment 0-10   Pain Score 4   Pain Type Acute pain   Pain Location [facility] Pain Intervention(s) Medication (See MAR); Repositioned   Response to Interventions pt reports relief in pain with towel roll behind neck for support   Restrictions/Precautions   Precautions Fall Risk;Limb alert;Pressure Ulcer  (limb alert LUE)   Weight Bearing Restrictions No   Cognition   Arousal/Participation Cooperative   Subjective   Subjective Pt reports feeling fatigued    QI: Sit to Lying   Assistance Needed Physical assistance   Assistance Provided by [place] Less than 25%   Comment for RLE   Sit to Lying CARE Score 3   QI: Lying to Sitting on Side of Bed   Assistance Needed Supervision   Assistance Provided by [place] No physical assistance   Lying to Sitting on Side of Bed CARE Score 4   QI: Chair/Bed-to-Chair Transfer   Assistance Needed Physical assistance   Assistance Provided by [place] 25%-49%   Chair/Bed-to-Chair Transfer CARE Score 3   Transfer Bed/Chair/Wheelchair   Limitations Noted In Balance; Coordination; Endurance;LE Strength   Adaptive Equipment None   Sit Pivot Minimal Assist   Supine to Sit Supervision   Sit to Supine Minimal Assist   Bed, Chair, Wheelchair Transfer (FIM) 4 - [place] lifts one extremity during transfer   Therapeutic Interventions   Neuromuscular Re-Education supine on mat: bridges, SAQ (AAROM on the R), hooklying ball squeezes, hooklying RLE ER/IR   Equipment Use   LE Bike x 10 min  Assessment   Treatment Assessment Pt reports feeling tired and that she doesn't want to "overdo it" today  Pt cont to present with RLE coordination/motor control deficits; pt can perform SAQ smooth controlled motion with LLE but requires AAROM for RLE to perform motion smoothly  Requires manual cues for all exercises involving RLE to assist with controlling the movement   Pt cont to show improvements in sit pivot transfers; pt is able to scoot herself to edge of mat and position herself correctly for the transfer; performs @ min A level  Pt fatigued @ end of session and requested to lay down in bed before she goes down for radiation treatment  Pt will cont to benefit from skilled PT to progress overall functional mobility  Barriers to Discharge Inaccessible home environment   PT Barriers   Physical Impairment Decreased strength;Decreased endurance; Impaired balance;Decreased mobility; Decreased coordination;Decreased safety awareness;Pain;Decreased skin integrity   Functional Limitation Car transfers; Ramp negotiation;Stair negotiation;Standing;Transfers; Walking; Wheelchair management   Plan   Treatment/Interventions Functional transfer training;LE strengthening/ROM; Elevations; Therapeutic exercise; Endurance training;Patient/family training;Equipment eval/education; Bed mobility;Gait training   Progress Progressing toward goals   Recommendation   Recommendation Home with family support;24 hour supervision/assist;Home PT   Equipment Recommended Wheelchair   PT Therapy Minutes   PT Time In 0950   PT Time Out 1030   PT Total Time (minutes) 40   PT Mode of treatment - Individual (minutes) 40   PT Mode of treatment - Concurrent (minutes) 0   PT Mode of treatment - Group (minutes) 0   PT Mode of treatment - Co-treat (minutes) 0   PT Mode of Teatment - Total time(minutes) 40 minutes   Therapy Time missed   Time missed?  No

## 2017-11-08 LAB
TROPONIN I SERPL-MCNC: <0.02 NG/ML

## 2017-11-08 PROCEDURE — 97530 THERAPEUTIC ACTIVITIES: CPT

## 2017-11-08 PROCEDURE — 93005 ELECTROCARDIOGRAM TRACING: CPT | Performed by: PHYSICIAN ASSISTANT

## 2017-11-08 PROCEDURE — 77412 RADIATION TX DELIVERY LVL 3: CPT | Performed by: RADIOLOGY

## 2017-11-08 PROCEDURE — 77387 GUIDANCE FOR RADJ TX DLVR: CPT | Performed by: RADIOLOGY

## 2017-11-08 PROCEDURE — 97112 NEUROMUSCULAR REEDUCATION: CPT

## 2017-11-08 PROCEDURE — 77417 THER RADIOLOGY PORT IMAGE(S): CPT | Performed by: RADIOLOGY

## 2017-11-08 PROCEDURE — 97110 THERAPEUTIC EXERCISES: CPT

## 2017-11-08 PROCEDURE — 97116 GAIT TRAINING THERAPY: CPT

## 2017-11-08 PROCEDURE — 84484 ASSAY OF TROPONIN QUANT: CPT | Performed by: PHYSICIAN ASSISTANT

## 2017-11-08 PROCEDURE — 97535 SELF CARE MNGMENT TRAINING: CPT

## 2017-11-08 RX ADMIN — DEXAMETHASONE 4 MG: 4 TABLET ORAL at 18:14

## 2017-11-08 RX ADMIN — DEXAMETHASONE 4 MG: 4 TABLET ORAL at 05:25

## 2017-11-08 RX ADMIN — HEPARIN SODIUM 5000 UNITS: 5000 INJECTION, SOLUTION INTRAVENOUS; SUBCUTANEOUS at 14:48

## 2017-11-08 RX ADMIN — DEXAMETHASONE 4 MG: 4 TABLET ORAL at 12:27

## 2017-11-08 RX ADMIN — ACETAMINOPHEN 975 MG: 325 TABLET, FILM COATED ORAL at 21:27

## 2017-11-08 RX ADMIN — HEPARIN SODIUM 5000 UNITS: 5000 INJECTION, SOLUTION INTRAVENOUS; SUBCUTANEOUS at 05:25

## 2017-11-08 RX ADMIN — OXYCODONE HYDROCHLORIDE 10 MG: 10 TABLET ORAL at 07:42

## 2017-11-08 RX ADMIN — LEVOTHYROXINE SODIUM 100 MCG: 100 TABLET ORAL at 05:25

## 2017-11-08 RX ADMIN — OXYCODONE HYDROCHLORIDE 10 MG: 10 TABLET ORAL at 19:29

## 2017-11-08 RX ADMIN — AMLODIPINE BESYLATE 5 MG: 5 TABLET ORAL at 08:25

## 2017-11-08 RX ADMIN — LIDOCAINE 1 PATCH: 50 PATCH CUTANEOUS at 08:25

## 2017-11-08 RX ADMIN — ACETAMINOPHEN 975 MG: 325 TABLET, FILM COATED ORAL at 14:47

## 2017-11-08 RX ADMIN — ALBUTEROL SULFATE 2 PUFF: 90 AEROSOL, METERED RESPIRATORY (INHALATION) at 08:34

## 2017-11-08 RX ADMIN — ALPRAZOLAM 0.25 MG: 0.25 TABLET ORAL at 09:31

## 2017-11-08 RX ADMIN — PREGABALIN 50 MG: 50 CAPSULE ORAL at 08:25

## 2017-11-08 RX ADMIN — PANTOPRAZOLE SODIUM 40 MG: 40 TABLET, DELAYED RELEASE ORAL at 05:25

## 2017-11-08 RX ADMIN — DEXAMETHASONE 4 MG: 4 TABLET ORAL at 23:45

## 2017-11-08 RX ADMIN — PREGABALIN 50 MG: 50 CAPSULE ORAL at 18:14

## 2017-11-08 RX ADMIN — PREGABALIN 50 MG: 50 CAPSULE ORAL at 21:27

## 2017-11-08 RX ADMIN — ALBUTEROL SULFATE 2 PUFF: 90 AEROSOL, METERED RESPIRATORY (INHALATION) at 23:45

## 2017-11-08 RX ADMIN — TOLTERODINE TARTRATE 4 MG: 4 CAPSULE, EXTENDED RELEASE ORAL at 08:26

## 2017-11-08 RX ADMIN — MORPHINE SULFATE 15 MG: 15 TABLET, EXTENDED RELEASE ORAL at 08:25

## 2017-11-08 RX ADMIN — ACETAMINOPHEN 975 MG: 325 TABLET, FILM COATED ORAL at 05:24

## 2017-11-08 RX ADMIN — LACTULOSE 20 G: 20 SOLUTION ORAL at 08:25

## 2017-11-08 RX ADMIN — MORPHINE SULFATE 15 MG: 15 TABLET, EXTENDED RELEASE ORAL at 18:14

## 2017-11-08 RX ADMIN — HEPARIN SODIUM 5000 UNITS: 5000 INJECTION, SOLUTION INTRAVENOUS; SUBCUTANEOUS at 21:27

## 2017-11-08 RX ADMIN — Medication 1 TABLET: at 21:26

## 2017-11-08 NOTE — PROGRESS NOTES
Physical Therapy Progress Note     11/08/17 1530   Pain Assessment   Pain Assessment No/denies pain   Pain Score No Pain   Restrictions/Precautions   Precautions Fall Risk;Limb alert;Pressure Ulcer   Weight Bearing Restrictions No   ROM Restrictions No   Cognition   Overall Cognitive Status WFL   Arousal/Participation Cooperative   Attention Within functional limits   Orientation Level Oriented X4   Memory Decreased short term memory;Decreased recall of recent events;Decreased recall of precautions   Following Commands Follows one step commands with increased time or repetition   Subjective   Subjective denies pain; no c/o   Bed Mobility   Able to Roll Left to Right;Right to Left;Scoot Up   Findings S   Transfer Bed/Chair/Wheelchair   Limitations Noted In Balance; Coordination   Adaptive Equipment Roller Walker   Sit Pivot Supervision   Stand Pivot Minimal Assist   Sit to Stand Minimal Assist   Stand to Sit Minimal Assist   Supine to Sit Supervision   Sit to Supine Supervision   Findings S/Andreas   Bed, Chair, Wheelchair Transfer (FIM) 4 - Patient completes 75% of all tasks   Ambulation   Primary Discharge Mode of Locomotion Wheelchair   Walk Assist Level Moderate Assist   Gait Pattern Ataxic;Decreased foot clearance; Inconsistant Barb; Slow Barb; Forward Flexion;R knee lolis;Narrow JAE;Shuffle;Decreased R stance; Improper weight shift   Assist Device Parallel Bars   Distance Walked (feet) 10 ft  (x8)   Limitations Noted In Balance; Coordination   Findings in pbars with verbal/tactile cueing for sequence/weight shifting; Walking (FIM) 1 - Patient ambulates less than 49 feet regardless of assist/device/set up   Wheelchair mobility   Wheelchair Assist Level Supervision   Method Right upper extremity; Left upper extremity   Needs Assist With Remove Leg Rest;Replace Leg Rest   Distance Level Surface (feet) 150 ft   Findings verbal cueing for technique   Wheelchair (FIM) 5 - Patient requires supervision/monitoring AND wheels distance 150 feet or more, no rest   Stairs   Findings NT   Toilet Transfer   Findings NT   Therapeutic Interventions   Other sit pivots x6; STS x8 in pbars with emphasis on anterior weight shift; amb 10' (x8) in pbars with modA and verbal tactile cueing for weight shift and sequence;    Assessment   Treatment Assessment Pt cont to require assistance and cueing for xfers and ambulation; emphasis on sit pivot xfers d/t ataxic/uncoordinated movement with B LE; pt able to complete sit pivot trials x6 to L and R with S and only verbal cueing; STS in pbars x8 with CS/Andreas for TKE R LE; amb 10' (x8) in pbars with min/modA, verbal and tactile cueing/assistance for sequence, displays ataxia R LE and occasional buckling R LE; w/c propel in hallways on level carperted floors x150' with S and verbal cueing for technique; assisted with sit pivot xfer back to bed; S for sit EOB > supine; assisted to doff clothing and place bedpan; finished session supine in bed with all needs in reach; recommend cont PT POC; Family/Caregiver Present no   Problem List Decreased strength;Decreased range of motion;Decreased endurance; Impaired balance;Decreased mobility; Decreased safety awareness   Barriers to Discharge Inaccessible home environment   PT Barriers   Physical Impairment Decreased strength;Decreased endurance; Impaired balance;Decreased mobility; Decreased coordination;Decreased safety awareness;Pain;Decreased skin integrity   Functional Limitation Car transfers; Ramp negotiation;Stair negotiation;Standing;Transfers; Walking; Wheelchair management   Plan   Treatment/Interventions ADL retraining;Functional transfer training;LE strengthening/ROM; Elevations; Therapeutic exercise; Endurance training;Cognitive reorientation;Patient/family training;Equipment eval/education; Bed mobility;Gait training   Progress Progressing toward goals   Recommendation   Recommendation Home with family support;Home PT   Equipment Recommended Wheelchair;Walker   PT Therapy Minutes   PT Time In    PT Time Out 1630   PT Total Time (minutes) 60   PT Mode of treatment - Individual (minutes) 60   PT Mode of treatment - Concurrent (minutes) 0   PT Mode of treatment - Group (minutes) 0   PT Mode of treatment - Co-treat (minutes) 0   PT Mode of Teatment - Total time(minutes) 60 minutes   Therapy Time missed   Time missed?  No     Kevin Javier, PTA

## 2017-11-08 NOTE — PROGRESS NOTES
Internal Medicine Progress Note  Patient: Adams Vyas  Age/sex: 81 y o  female  Medical Record #: [id number]      ASSESSMENT/PLAN:  Adams Vyas is seen and examined and mangement for following issues:    1  Cervical cord compression with myelopathy/Mass at [facility]: Likely related to metastatic disease  Currently undergoing XRT for 10 sessions to lumbar and cervical spine  Continue Decadron 4mg Q6 hours  Pain control  Therapy per primary team    2  Metastatic breast cancer: patient with moderate left pleural effusion, multiple hepatic lesions, possible adrenal mass and multiple bony lesions  Overall prognosis poor  Outpatient follow up with primary oncologist  Previously on Xeloda  3  Left pleural effusion: likely malignant  Monitor respiratory status  Currently denies complaints of SOB  4  Hypertension: continue norvasc and monitor blood pressure  5  Hypothyroidism: continue levothyroxine  6  Peripheral neuropathy: continue Lyrica  7  DVT prophylaxis: SQ heparin  8  Hyponatremia:  Mild at 134  Asymptomatic  Will monitor  9  Overactive bladder:  Detrol LA started  10  Chest pain: All VSS/ EKG reveals frequent PVCs otherwise WNL, trend troponin  Anxiolytic administered  ? Anxiety related, vs from bony mets? Check Mag level in AM  K 4 8 on last lab                 Subjective: Patient seen and examined  Pt reports having chest tightness across her chest this AM while laying in bed  Was also associated with sharp shooting pains in her left leg  She reported no associated SOB, diaphoresis or nausea, dyspepsia  She felt somewhat better with sitting upright  She reports getting anxious prior to radiation treatments and is requesting a xanax      ROS:   GI: denies abdominal pain, change bowel habits or reflux symptoms  Neuro: No new neurologic changes  Respiratory: No Cough, SOB  Cardiovascular: No CP, palpitations     Scheduled Meds:    acetaminophen 975 mg Oral Q8H [address]   amLODIPine 5 mg Oral Daily   dexamethasone 4 mg Oral Q6H [address]   heparin (porcine) 5,000 Units Subcutaneous Q8H [address]   lactulose 20 g Oral BID   levothyroxine 100 mcg Oral Early Morning   lidocaine 1 patch Transdermal Daily   menthol-zinc oxide 1 application Topical TID   morphine 15 mg Oral BID   pantoprazole 40 mg Oral Early Morning   pregabalin 50 mg Oral TID   senna-docusate sodium 1 tablet Oral HS   tolterodine 4 mg Oral Daily       Labs:       Results from last 7 days  Lab Units 11/06/17  0614   WBC Thousand/uL 6 64   HEMOGLOBIN g/dL 11 9   HEMATOCRIT % 35 8   PLATELETS Thousands/uL 232       Results from last 7 days  Lab Units 11/06/17 0614   SODIUM mmol/L 134*   POTASSIUM mmol/L 4 8   CHLORIDE mmol/L 102   CO2 mmol/L 25   BUN mg/dL 23   CREATININE mg/dL 0 48*   GLUCOSE RANDOM mg/dL 102   CALCIUM mg/dL 7 8*                  Glucose (mg/dL)   Date Value   11/06/2017 102   10/31/2017 101   10/29/2017 120   10/28/2017 85       Labs reviewed    Physical Examination:  Vitals:   Vitals:    11/08/17 0000 11/08/17 0524 11/08/17 0710 11/08/17 0905   BP: 125/82  115/73 125/71   Pulse: 81  99 90   Resp: 20  20    Temp: (!) 97 4 °F (36 3 °C)  98 2 °F (36 8 °C)    TempSrc: Tympanic  Oral    SpO2: 98%  97% 98%   Weight:  69 kg (152 lb 1 9 oz)     Height:           GEN: NAD, sitting on side of bed  HEENT: NC/AT, EOMI  RESP: CTAB, no R/R/W  CV: +S1 S2, regular rate, no rubs, no chest wall tenderness  ABD: soft, NT, ND, normal BS   : No Singh  EXT: No edema  Skin: No rashes  Neuro: AAOx3  Bilat pill rolling tremor  [ X ] Total time spent: 30 Mins and greater than 50% of this time was spent counseling/coordinating care        Jamie Cross PA-C  Internal Medicine

## 2017-11-08 NOTE — PROGRESS NOTES
Physical Medicine and Rehabilitation Progress Note:  Spinal Cord Dysfunction:  Non-Traumatic:  04 130 Other Non-Traumatic Cord Compression  Eileen Womack 81 y o  female MRN: [id number]  Unit/Bed#: -01 Encounter: [id number]    Assessment:   Rik Michel is a 81 y o  female who  has a past medical history of Arthritis; Asthma; Breast cancer ([address] ); Disease of thyroid gland; and Hypertension  and presented to the [address] after outpatient MRI demonstrated cord compression  Palliative treatment to spine started  Surgical intervention not recommended due to extent of metastasis  She was [facility] to [facility] on 11[facility]03[facility]17  Subjective: No acute events overnight  Guy Gill was seen while in her room  She states she had 2 episodes of brief sharp stabbing pain in mid-sternal region extending to left arm and down her left lower extremity  Reports no such pain at this time, but does endorse anxiety from the pain  States she feels much calmer now  No SOB  ROS: A 10-point ROS was performed  Negative except as listed above  Restrictions include:  Fall precautions    Disposition: TBD    Plan:  # Cord compression, myelopathy  - Acute comprehensive interdisciplinary inpatient rehabilitation including PT, OT, SLP, RN, CM, SW, dietary, psychology, etc   - Appreciate Internal Medicine following - Dr Ayad Perdomo service      - secondary to spinal mets  - undergoing XRT (total of 10 sessions)  - Continue steroids    # Chest pain  - EKG performed, per IM team, demonstrates multiple PVCs  - Troponins being drawn, first one is < 02  - Continue to monitor for now      # Metastatic breast CA  - f/u with oncology as outpatient  - noted to have edema to LUE, will require compression garment     # Neuropathy  - continue lyrica     # Anemia (improved)  - Likely reactive  - continue monitoring CBC       Results from last 7 days  Lab Units 11/06/17  0614   HEMOGLOBIN g/dL 11 9     # HTN  - Continue Amlodipine     Temp:  [97 4 °F (36 3 °C)-98 2 °F (36 8 °C)] 98 2 °F (36 8 °C)  HR:  [81-99] 90  Resp:  [18-20] 20  BP: (107-125)/(71-82) 125/71    # Hypothyroid  - Continue levothyroxine      # Pain  - Continue tylenol PRN, for max of 3gm daily     - Continue oxycodone   - Continue MS Contin  - This patient record was searched in the [place] PDMP and the risks/benefits of prescribing this patient medications with high abuse/dependence potential was assessed and determined to be medically appropriate at this time  I could not find any prescriptions in [place], [place]  Patient reports she was getting oxycodone 10mg TID (chart review notes she was taking 10mg Q6h PRN), and MS contin 15mg BID      # Rehab Psych  - Continue alprazolam PRN  - Neuropsych consult, appreciate recs     # FENA/prophy  - Diet: cardiac  SLP and nutrition to monitor and adjust as necessary   - DVT prophy: Sequential compression device (Venodyne)  and Heparin  - GI ppx: Pantaprazole  - Bowel: colace , dulcolax suppository  and miralax PRN  - Nausea: Zofran  - Supplements: None  - Sleep: None      CODE: Level 1: Full Code    Objective:  Nursing staff reporting: Chest Pain    Functional Update:  Physical Therapy Occupational Therapy   Weight Bearing Status: Full Weight Bearing  Transfers: Minimal Assistance  Bed Mobility: Supervision  Amulation Distance (ft): 10 feet  Ambulation: Moderate Assistance, Assist of 2  Assistive Device for Ambulation:  (parallel bars)  Wheelchair Mobility Distance: 150 ft  Wheelchair Mobility: Minimal Assistance, Supervision  Discharge Recommendations: Home with:  [address] with[de-identified] 24 Hour Supervision, 24 Hour Assisteance, Family Support, First Floor Setup, Home Physical Therapy   Eating: Supervision  Grooming: Supervision  Bathing: Moderate Assistance  Bathing: Moderate Assistance  Upper Body Dressing: Supervision  Lower Body Dressing: Total Assistance  Toileting: Maximum Assistance  Tub/Shower Transfer:  Total Assistance  Toilet Transfer: Total Assistance  Cognition: Within Defined Limits  Orientation: Person, Place, Time, Situation       Allergies and Medications per EMR    Physical Exam:  General: alert, no apparent distress, cooperative and comfortable  HENMT: Head: Normal, normocephalic, atraumatic  Eye: Normal external eye, conjunctiva, lids   Ears: Normal external ears  Nose: Normal external nose, mucus membranes  Pulmonary: chest expansion normal, no retractions, no accessory muscle usage  Abdomen: soft, nontender, nondistended, no masses or organomegaly  Skin/Extremity: no rashes, no erythema, LUE edema   Neurologic: Awake alert orientedx3  Psych: normal mood, behavior, speech, dress, and thought processes    Diagnostic Studies:  Reviewed, no new imaging    Vitals: Reviewed   Temp:  [97 4 °F (36 3 °C)-98 2 °F (36 8 °C)] 98 2 °F (36 8 °C)  HR:  [81-99] 90  Resp:  [18-20] 20  BP: (107-125)/(71-82) 125/71   Intake/Output Summary (Last 24 hours) at 11/08/17 1042  Last data filed at 11/08/17 0847   Gross per 24 hour   Intake              780 ml   Output              750 ml   Net               30 ml        Laboratory: Reviewed  Lab Results   Component Value Date    HGB 11 9 11/06/2017    HCT 35 8 11/06/2017    WBC 6 64 11/06/2017     Lab Results   Component Value Date    BUN 23 11/06/2017     (L) 11/06/2017    K 4 8 11/06/2017     11/06/2017    GLUCOSE 102 11/06/2017    CREATININE 0 48 (L) 11/06/2017     No results found for: PROTIME, INR     Patient Active Problem List   Diagnosis    Cervical cord compression with myelopathy ([facility] )    Breast cancer ([facility] )    Essential hypertension    Hypothyroidism       ** Please Note: Fluency Direct voice to text software may have been used in the creation of this document  **    [ x ] Total time spent: 30 Mins, and greater than 50% of this time was spent counseling/coordinating care

## 2017-11-08 NOTE — PROGRESS NOTES
Physical Therapy Progress Note   11/08/17 0900   Pain Assessment   Pain Assessment 0-10   Pain Score 3   Pain Location Chest   Pain Orientation Bilateral   Pain Descriptors Discomfort;Tightness   Pain Frequency Constant/continuous   Restrictions/Precautions   Precautions Fall Risk;Limb alert;Pressure Ulcer   Weight Bearing Restrictions No   ROM Restrictions No   General   Change In Medical/Functional Status c/o chest pain and tightness this AM; NSG and PA ordered and completed EKG and blood draw;    Cognition   Orientation Level Oriented X4   Subjective   Subjective c/o chest pain and tightness upon entering pt's room; NSG inforfmed and pt examined by Brian BARNARD;    QI: Roll Left and Right   Assistance Needed Supervision   Assistance Provided by [place] No physical assistance   Roll Left and Right CARE Score 4   QI: Sit to [address] Provided by [place] No physical assistance   Sit to Lying CARE Score 4   QI: Lying to Sitting on Side of Bed   Assistance Needed Supervision   Assistance Provided by [place] No physical assistance   Lying to Sitting on Side of Bed CARE Score 4   QI: Sit to Stand   Assistance Needed Incidental touching   Assistance Provided by [place] No physical assistance   Sit to Stand CARE Score 4   Bed Mobility   Able to Roll Left to Right;Right to Left;Scoot Up   Findings S   QI: Chair/Bed-to-Chair Transfer   Assistance Needed Physical assistance   Assistance Provided by [place] 25%-49%   Comment Andreas SPT with RW   Chair/Bed-to-Chair Transfer CARE Score 3   Transfer Bed/Chair/Wheelchair   Limitations Noted In Balance; Coordination   Adaptive Equipment Roller Walker   Stand Pivot Minimal Assist   Sit to Stand Minimal Assist   Stand to Sit Minimal Assist   Supine to Sit Supervision   Sit to Supine Supervision   Bed, Chair, Wheelchair Transfer (FIM) 4 - Patient completes 75% of all tasks   QI: Car Transfer   Reason if not Attempted Activity not applicable   Car Transfer CARE Score 9   QI: Walk 10 Feet   Assistance Needed Physical assistance   Assistance Provided by [place] 50%-74%   Walk 10 Feet CARE Score 2   QI: Walk 50 Feet with Two Turns   Reason if not Attempted Activity not applicable   Walk 50 Feet with Two Turns CARE Score 9   QI: Walk 150 Feet   Reason if not Attempted Activity not applicable   Walk 658 Feet CARE Score 9   QI: Walking 10 Feet on Uneven Surfaces   Reason if not Attempted Activity not applicable   Walking 10 Feet on Uneven Surfaces CARE Score 9   Ambulation   Does the patient walk? 2  Yes   Primary Discharge Mode of Locomotion Wheelchair   Walk Assist Level Moderate Assist   Gait Pattern Ataxic;Decreased foot clearance; Inconsistant Barb; Forward Flexion; Improper weight shift; Step to   Assist Device Parallel Bars   Findings NT this session; Walking (FIM) 0 - Activity does not occur   QI: Wheel 50 Feet with [address] Provided by [place] No physical assistance   Wheel 50 Feet with Two Turns CARE Score 4   QI: Wheel 150 [address] Provided by [place] No physical assistance   Wheel 150 Feet CARE Score 4   Wheelchair mobility   QI: Does the patient use a wheelchair? 1  Yes   QI: Indicate the type of wheelchair 1  Manual   Wheelchair Assist Level Supervision   Method Right upper extremity; Left upper extremity   Needs Assist With Remove Leg Rest;Replace Leg Rest   Distance Level Surface (feet) 150 ft   Wheelchair (FIM) 5 - Patient requires supervision/monitoring AND wheels distance 150 feet or more, no rest   QI: 1 Step (Curb)   Reason if not Attempted Activity not applicable   1 Step (Curb) CARE Score 9   QI: 4 Steps   Reason if not Attempted Activity not applicable   4 Steps CARE Score 9   QI: 12 Steps   Reason if not Attempted Activity not applicable   12 Steps CARE Score 9   Stairs   Findings NT this session;   QI: Picking Up Object   Reason if not Attempted Activity not applicable   Picking Up Object CARE Score 9   QI: Toilet Transfer   Reason if not Attempted Activity not applicable   Toilet Transfer CARE Score 9   Toilet Transfer   Toilet Transfer (FIM) 0 - Activity does not occur   Assessment   Treatment Assessment Pt supine in bed prior to session; reports pain/tightness in chest; NSG informed and pt examined/assessed by EVON Salas; EKG performed and blood draw for troponins; pt assisted to sit EOB, reports relief with upright sitting; Andreas for sit EOB > supine; vitals recorded - BP, Spo2 and HR all WNL; pt finished short 30-minute session supine in bed for assessment by doc/PA; placed on med hold for AM only; will cont PT in PM; recommend cont PT POC; Family/Caregiver Present no   Problem List Decreased strength;Decreased range of motion;Decreased endurance; Impaired balance;Decreased mobility; Decreased safety awareness   Barriers to Discharge Inaccessible home environment   Barriers to Discharge Comments JAN and steps inside home;    PT Barriers   Physical Impairment Decreased strength;Decreased endurance; Impaired balance;Decreased mobility; Decreased coordination;Decreased safety awareness;Pain;Decreased skin integrity   Functional Limitation Car transfers; Ramp negotiation;Stair negotiation;Standing;Transfers; Walking; Wheelchair management   Plan   Treatment/Interventions ADL retraining;Functional transfer training;LE strengthening/ROM; Elevations; Therapeutic exercise; Endurance training;Cognitive reorientation;Patient/family training;Equipment eval/education; Bed mobility;Gait training   Progress Progressing toward goals   Recommendation   Recommendation Home with family support;24 hour supervision/assist;Home PT   Equipment Recommended Wheelchair;Walker   PT Therapy Minutes   PT Time In 0900   PT Time Out 0930   PT Total Time (minutes) 30   PT Mode of treatment - Individual (minutes) 30   PT Mode of treatment - Concurrent (minutes) 0   PT Mode of treatment - Group (minutes) 0 PT Mode of treatment - Co-treat (minutes) 0   PT Mode of Teatment - Total time(minutes) 30 minutes   Therapy Time missed   Time missed?  No     Leni Bee, PTA

## 2017-11-08 NOTE — PROGRESS NOTES
patient returned from radiation tx  and ate lunch  Patient has no c/o of pain or discomfort r/t earlier event   EKG wnl, trop #1  results wnl  Patient A&O with c/o of pain 3-10  Will continue to monitor for needs or changes

## 2017-11-08 NOTE — PROGRESS NOTES
11/08/17 1230   Pain Assessment   Pain Assessment 0-10   Pain Score 3   Pain Type Acute pain   Dressing/Undressing Clothing   LB Dressing (FIM) 2 - Patient completes 25-49% of all tasks   QI: Sit to Lying   Assistance Needed Supervision   Sit to Lying CARE Score 4   Transfer Bed/Chair/Wheelchair   Positioning Concerns Skin Integrity   Limitations Noted In Balance;Confidence; Coordination; Endurance   Sit Pivot Minimal Assist   Bed, Chair, Wheelchair Transfer (FIM) 4 - Patient completes 75% of all tasks   QI: Toilet Transfer   Assistance Needed Physical assistance   Assistance Provided by [place] Less than 25%   Toilet Transfer CARE Score 3   Toilet Transfer   Surface Assessed Drop Arm Commode   Toilet Transfer (FIM) 4 - Patient completes 75% of all tasks   Activity Tolerance   Activity Tolerance Patient limited by fatigue   Assessment   Treatment Assessment Pt participates in skilled OT session focusing on functional transfers, standing balance and overall activity tolerance  Pt identifies self goal for toielting on her own at bedside and walking into bathroom to use toilet  Pt complete multiple sit piv transfers to drop arm commode  Pt requires cues for foot placement during sit piv transfer and steadying of surfaces  Pt requires cues for controlled decent to [facility] for skin integrity  Recommend use of drop arm commode at bedside w/ sit pivots  Pt edu on use of toileting schedule to decrease incidents of incontinence  Pt requires MOD A in stance for balance w/ LOB to L, and backwards  Trialed unilateral UE release to simulate toielting tasks, requiring MOD A  Pt continues to require skilled OT services to increase overall functional independence  and safety w/ ADLs and functional transfers       Prognosis Fair   Plan   Treatment/Interventions ADL retraining;Functional transfer training   OT Therapy Minutes   OT Time In 1230   OT Time Out 1420   OT Total Time (minutes) 110   OT Mode of treatment - Individual (minutes) 110   OT Mode of treatment - Concurrent (minutes) 0   OT Mode of treatment - Group (minutes) 0   OT Mode of treatment - Co-treat (minutes) 0   OT Mode of Teatment - Total time(minutes) 110 minutes   Therapy Time missed   Time missed?  No

## 2017-11-09 LAB
ANION GAP SERPL CALCULATED.3IONS-SCNC: 6 MMOL/L (ref 4–13)
BASOPHILS # BLD MANUAL: 0 THOUSAND/UL (ref 0–0.1)
BASOPHILS NFR MAR MANUAL: 0 % (ref 0–1)
BUN SERPL-MCNC: 27 MG/DL (ref 5–25)
CALCIUM SERPL-MCNC: 7.8 MG/DL (ref 8.3–10.1)
CHLORIDE SERPL-SCNC: 100 MMOL/L (ref 100–108)
CO2 SERPL-SCNC: 27 MMOL/L (ref 21–32)
CREAT SERPL-MCNC: 0.43 MG/DL (ref 0.6–1.3)
EOSINOPHIL # BLD MANUAL: 0 THOUSAND/UL (ref 0–0.4)
EOSINOPHIL NFR BLD MANUAL: 0 % (ref 0–6)
ERYTHROCYTE [DISTWIDTH] IN BLOOD BY AUTOMATED COUNT: 14.4 % (ref 11.6–15.1)
GFR SERPL CREATININE-BSD FRML MDRD: 119 ML/MIN/1.73SQ M
GLUCOSE SERPL-MCNC: 92 MG/DL (ref 65–140)
HCT VFR BLD AUTO: 34 % (ref 34.8–46.1)
HGB BLD-MCNC: 11.5 G/DL (ref 11.5–15.4)
LYMPHOCYTES # BLD AUTO: 0.1 THOUSAND/UL (ref 0.6–4.47)
LYMPHOCYTES # BLD AUTO: 1 % (ref 14–44)
MAGNESIUM SERPL-MCNC: 3.2 MG/DL (ref 1.6–2.6)
MCH RBC QN AUTO: 28.2 PG (ref 26.8–34.3)
MCHC RBC AUTO-ENTMCNC: 33.8 G/DL (ref 31.4–37.4)
MCV RBC AUTO: 83 FL (ref 82–98)
MONOCYTES # BLD AUTO: 0.41 THOUSAND/UL (ref 0–1.22)
MONOCYTES NFR BLD: 4 % (ref 4–12)
NEUTROPHILS # BLD MANUAL: 9.84 THOUSAND/UL (ref 1.85–7.62)
NEUTS SEG NFR BLD AUTO: 95 % (ref 43–75)
NRBC BLD AUTO-RTO: 0 /100 WBCS
PLATELET # BLD AUTO: 206 THOUSANDS/UL (ref 149–390)
PLATELET BLD QL SMEAR: ADEQUATE
PMV BLD AUTO: 9.3 FL (ref 8.9–12.7)
POTASSIUM SERPL-SCNC: 4.9 MMOL/L (ref 3.5–5.3)
RBC # BLD AUTO: 4.08 MILLION/UL (ref 3.81–5.12)
RBC MORPH BLD: NORMAL
SODIUM SERPL-SCNC: 133 MMOL/L (ref 136–145)
WBC # BLD AUTO: 10.36 THOUSAND/UL (ref 4.31–10.16)

## 2017-11-09 PROCEDURE — 85007 BL SMEAR W/DIFF WBC COUNT: CPT | Performed by: PHYSICIAN ASSISTANT

## 2017-11-09 PROCEDURE — 77387 GUIDANCE FOR RADJ TX DLVR: CPT | Performed by: RADIOLOGY

## 2017-11-09 PROCEDURE — 97116 GAIT TRAINING THERAPY: CPT | Performed by: PHYSICAL THERAPIST

## 2017-11-09 PROCEDURE — 85027 COMPLETE CBC AUTOMATED: CPT | Performed by: PHYSICIAN ASSISTANT

## 2017-11-09 PROCEDURE — 97112 NEUROMUSCULAR REEDUCATION: CPT | Performed by: PHYSICAL THERAPIST

## 2017-11-09 PROCEDURE — 97535 SELF CARE MNGMENT TRAINING: CPT

## 2017-11-09 PROCEDURE — 80048 BASIC METABOLIC PNL TOTAL CA: CPT | Performed by: PHYSICIAN ASSISTANT

## 2017-11-09 PROCEDURE — 83735 ASSAY OF MAGNESIUM: CPT | Performed by: PHYSICIAN ASSISTANT

## 2017-11-09 PROCEDURE — 97530 THERAPEUTIC ACTIVITIES: CPT | Performed by: PHYSICAL THERAPIST

## 2017-11-09 PROCEDURE — 77412 RADIATION TX DELIVERY LVL 3: CPT | Performed by: RADIOLOGY

## 2017-11-09 RX ORDER — GUAIFENESIN 600 MG
600 TABLET, EXTENDED RELEASE 12 HR ORAL EVERY 12 HOURS SCHEDULED
Status: DISCONTINUED | OUTPATIENT
Start: 2017-11-09 | End: 2017-11-09

## 2017-11-09 RX ORDER — GUAIFENESIN 600 MG
600 TABLET, EXTENDED RELEASE 12 HR ORAL EVERY 12 HOURS SCHEDULED
Status: COMPLETED | OUTPATIENT
Start: 2017-11-09 | End: 2017-11-13

## 2017-11-09 RX ADMIN — LACTULOSE 20 G: 20 SOLUTION ORAL at 10:18

## 2017-11-09 RX ADMIN — Medication 1 TABLET: at 21:19

## 2017-11-09 RX ADMIN — PREGABALIN 50 MG: 50 CAPSULE ORAL at 08:21

## 2017-11-09 RX ADMIN — AMLODIPINE BESYLATE 5 MG: 5 TABLET ORAL at 08:20

## 2017-11-09 RX ADMIN — PANTOPRAZOLE SODIUM 40 MG: 40 TABLET, DELAYED RELEASE ORAL at 05:05

## 2017-11-09 RX ADMIN — LIDOCAINE 1 PATCH: 50 PATCH CUTANEOUS at 10:22

## 2017-11-09 RX ADMIN — ANORECTAL OINTMENT 1 APPLICATION: 15.7; .44; 24; 20.6 OINTMENT TOPICAL at 08:23

## 2017-11-09 RX ADMIN — DEXAMETHASONE 4 MG: 4 TABLET ORAL at 23:35

## 2017-11-09 RX ADMIN — TOLTERODINE TARTRATE 4 MG: 4 CAPSULE, EXTENDED RELEASE ORAL at 08:22

## 2017-11-09 RX ADMIN — ANORECTAL OINTMENT 1 APPLICATION: 15.7; .44; 24; 20.6 OINTMENT TOPICAL at 21:22

## 2017-11-09 RX ADMIN — PREGABALIN 50 MG: 50 CAPSULE ORAL at 21:19

## 2017-11-09 RX ADMIN — DEXAMETHASONE 4 MG: 4 TABLET ORAL at 12:13

## 2017-11-09 RX ADMIN — ACETAMINOPHEN 975 MG: 325 TABLET, FILM COATED ORAL at 21:18

## 2017-11-09 RX ADMIN — PREGABALIN 50 MG: 50 CAPSULE ORAL at 16:57

## 2017-11-09 RX ADMIN — ONDANSETRON 4 MG: 4 TABLET, ORALLY DISINTEGRATING ORAL at 21:24

## 2017-11-09 RX ADMIN — ANORECTAL OINTMENT 1 APPLICATION: 15.7; .44; 24; 20.6 OINTMENT TOPICAL at 18:01

## 2017-11-09 RX ADMIN — HEPARIN SODIUM 5000 UNITS: 5000 INJECTION, SOLUTION INTRAVENOUS; SUBCUTANEOUS at 21:19

## 2017-11-09 RX ADMIN — DEXAMETHASONE 4 MG: 4 TABLET ORAL at 05:05

## 2017-11-09 RX ADMIN — MORPHINE SULFATE 15 MG: 15 TABLET, EXTENDED RELEASE ORAL at 08:21

## 2017-11-09 RX ADMIN — HEPARIN SODIUM 5000 UNITS: 5000 INJECTION, SOLUTION INTRAVENOUS; SUBCUTANEOUS at 05:06

## 2017-11-09 RX ADMIN — ALPRAZOLAM 0.25 MG: 0.25 TABLET ORAL at 08:20

## 2017-11-09 RX ADMIN — MORPHINE SULFATE 15 MG: 15 TABLET, EXTENDED RELEASE ORAL at 17:59

## 2017-11-09 RX ADMIN — DEXAMETHASONE 4 MG: 4 TABLET ORAL at 17:00

## 2017-11-09 RX ADMIN — LEVOTHYROXINE SODIUM 100 MCG: 100 TABLET ORAL at 05:05

## 2017-11-09 RX ADMIN — HEPARIN SODIUM 5000 UNITS: 5000 INJECTION, SOLUTION INTRAVENOUS; SUBCUTANEOUS at 14:01

## 2017-11-09 RX ADMIN — GUAIFENESIN 600 MG: 600 TABLET, EXTENDED RELEASE ORAL at 10:20

## 2017-11-09 RX ADMIN — OXYCODONE HYDROCHLORIDE 10 MG: 10 TABLET ORAL at 12:13

## 2017-11-09 RX ADMIN — ACETAMINOPHEN 975 MG: 325 TABLET, FILM COATED ORAL at 05:06

## 2017-11-09 RX ADMIN — ACETAMINOPHEN 975 MG: 325 TABLET, FILM COATED ORAL at 14:00

## 2017-11-09 RX ADMIN — GUAIFENESIN 600 MG: 600 TABLET, EXTENDED RELEASE ORAL at 21:18

## 2017-11-09 NOTE — PROGRESS NOTES
11/09/17 0700   Pain Assessment   Pain Assessment 0-10   Pain Score 4   Pain Type Acute pain   Pain Location Leg   Pain Orientation Left   Pain Descriptors Stabbing   Pain Onset Sudden  (with standing)   QI: Eating   Assistance Needed Set-up / clean-up   Eating CARE Score 5   Eating Assessment   Findings Pt completes set up of breakfast try sitting upright unsupported to increase overall sitting balance  pt does not need use of built up utensil for R UE spoon use   Eating (FIM) 5 - Patient needs help to open contianers or set up tray   QI: Oral Hygiene   Assistance Needed Set-up / clean-up   Oral Hygiene CARE Score 5   Grooming   Able To Brush/Clean Teeth;Wash/Dry Hands;Comb/Brush Hair   Findings seated at sink   Grooming (FIM) 5 - Patient requires supervision/monitoring   QI: Shower/Bathe Self   Assistance Needed Physical assistance   Assistance Provided by [place] 25%-49%   Shower/Bathe Self CARE Score 3   Bathing   Assessed Bath Style Shower   Anticipated D/C Bath Style Sponge Bath   Able to [place] No   Able to [employer] Temperature No   Able to Wash/Rinse/Dry (body part) Left Arm;Right Arm;L Upper Leg;R Upper Leg;Chest;Abdomen;Perineal Area;L Lower Leg/Foot   Positioning Seated;Standing   Findings  in stance w/ MIN A  for bathing buttocks pt attempting to turn in shower  VC's for static stance only w/ grab bar use  Pt does tip over tub bench backward w/ retropulsion and turing  Bathing (FIM) 4 - Patient completes 8/10 or 9/10 parts   Tub/Shower Transfer   Limitations Noted In Balance; Coordination; Endurance;Problem Solving   Assessed Shower   Findings sit piv to tub bench from [facility]   Shower Transfer (FIM) 2 - [place] needs to lift or boost to rise AND assist to sit   QI: Upper Body Dressing   Assistance Needed Physical assistance   Assistance Provided by [place] Less than 25%   Upper Body Dressing CARE Score 3   QI: Lower Body Dressing   Assistance Needed Physical assistance   Assistance Provided by [place] 75% or more   Lower Body Dressing CARE Score 2   QI: Putting On/Taking Off Footwear   Assistance Needed Physical assistance   Assistance Provided by [place] 75% or more   Putting On/Taking Off Footwear CARE Score 2   QI: Picking Up Object   Reason if not Attempted Safety concerns   Picking Up Object CARE Score 88   Dressing/Undressing Clothing   Remove UB Clothes (gown)   Remove LB Clothes [address]; Undergarment;Socks   Limitations Noted In Balance; Endurance;Strength; Sequencing;Problem Solving   Positioning Supported Sit   Findings Pt requires A for threading in LE into underwear on R side  pt demo difficulty w/ coordinating LE lifting and threading, and decreaed sensation  UNilateral UE support on counter top for donning over hips but requires asssist for completion   QI: Lying to Sitting on Side of Bed   Assistance Needed Supervision   Lying to Sitting on Side of Bed CARE Score 4   QI: Sit to Stand   Assistance Needed Incidental touching   Sit to Stand CARE Score 4   QI: Chair/Bed-to-Chair Transfer   Assistance Needed Physical assistance   Assistance Provided by [place] 25%-49%   Chair/Bed-to-Chair Transfer CARE Score 3   Transfer Bed/Chair/Wheelchair   Positioning Concerns Skin Integrity   Limitations Noted In Balance; Coordination;Problem Solving;UE Strength;LE Strength   Sit Pivot Minimal Assist   Bed, Chair, Wheelchair Transfer (FIM) 4 - Patient completes 75% of all tasks   QI: [address] Needed Physical assistance   Assistance Provided by [place] 50%-74%   Toileting Hygiene CARE Score 2   Toileting   Able to [address] down no, up no     Findings Bed pan use upon entry to room   Toileting (FIM) 1 - Patient completes less than 25% of all tasks   Activity Tolerance   Activity Tolerance Patient limited by fatigue   Assessment   Treatment Assessment Pt participated in skilled OT session focusing on ADL retraining, activity tolerance, activity modification, use of AE, and functional transfers  See above for details on ADL function  Pt continued to be limited by decreased balance, LE and UE strength, poor endurance, decreased activity tolerance, decreased sensation on R LE, Axtaxia  Pt reports dissatisfaction w/ current level of function and progress  "I thought I would be able to do something, But I cant do anything " Pt edu on family support to complete ADLs for optimal safety at home  Recommend use of WC for all ADLs 2* significant balance deficits and decreased activity tolerance  Pt continues to require skilled OT services to increase overall functional independence  and safety w/ ADLs and functional transfers  Prognosis Fair   Problem List Decreased strength;Decreased range of motion;Decreased endurance; Impaired balance;Decreased mobility; Decreased coordination; Impaired sensation   Plan   Treatment/Interventions ADL retraining;Functional transfer training   Progress Slow progress, decreased activity tolerance   OT Therapy Minutes   OT Time In 0700   OT Time Out 0830   OT Total Time (minutes) 90   OT Mode of treatment - Individual (minutes) 90   OT Mode of treatment - Concurrent (minutes) 0   OT Mode of treatment - Group (minutes) 0   OT Mode of treatment - Co-treat (minutes) 0   OT Mode of Teatment - Total time(minutes) 90 minutes

## 2017-11-09 NOTE — PROGRESS NOTES
Internal Medicine Progress Note  Patient: Dagoberto Garcia  Age/sex: 81 y o  female  Medical Record #: [id number]      ASSESSMENT/PLAN:  Dagoberto Garcia is seen and examined and mangement for following issues:    1  Cervical cord compression with myelopathy/Mass at [facility]: Likely related to metastatic disease  Currently undergoing XRT for 10 sessions to lumbar and cervical spine  Continue Decadron 4mg Q6 hours  Pain control  Therapy per primary team    2  Metastatic breast cancer: patient with moderate left pleural effusion, multiple hepatic lesions, possible adrenal mass and multiple bony lesions  Overall prognosis poor  Outpatient follow up with primary oncologist  Previously on Xeloda  3  Left pleural effusion: likely malignant  Monitor respiratory status  Currently denies complaints of SOB  4  Hypertension: continue norvasc and monitor blood pressure  5  Hypothyroidism: continue levothyroxine  6  Peripheral neuropathy: continue Lyrica  7  DVT prophylaxis: SQ heparin  8  Hyponatremia:  Mild at 133  Asymptomatic  Will monitor  9  Overactive bladder:  Detrol LA started  10  Chest pain: All VSS/ EKG reveals frequent PVCs otherwise WNL, Troponin negative x 3  Anxiolytic administered  ? Anxiety related, vs from bony mets? No further pain reported               Subjective: Patient seen and examined  Pt reports no further chest pain or tightness  She occasionally gets sharp pains that radiate down her LLE  No N/V/SOB   Denies current complaints    ROS:   GI: denies abdominal pain, change bowel habits or reflux symptoms  Neuro: No new neurologic changes  Respiratory: No Cough, SOB  Cardiovascular: No CP, palpitations     Scheduled Meds:    acetaminophen 975 mg Oral Q8H [address]   amLODIPine 5 mg Oral Daily   dexamethasone 4 mg Oral Q6H [place]   guaiFENesin 600 mg Oral Q12H [address]   heparin (porcine) 5,000 Units Subcutaneous Q8H [address]   lactulose 20 g Oral BID   levothyroxine 100 mcg Oral Early Morning   lidocaine 1 patch Transdermal Daily   menthol-zinc oxide 1 application Topical TID   morphine 15 mg Oral BID   pantoprazole 40 mg Oral Early Morning   pregabalin 50 mg Oral TID   senna-docusate sodium 1 tablet Oral HS   tolterodine 4 mg Oral Daily       Labs:       Results from last 7 days  Lab Units 11/09/17  0506 11/06/17  0614   WBC Thousand/uL 10 36* 6 64   HEMOGLOBIN g/dL 11 5 11 9   HEMATOCRIT % 34 0* 35 8   PLATELETS Thousands/uL 206 232       Results from last 7 days  Lab Units 11/09/17  0506 11/06/17  0614   SODIUM mmol/L 133* 134*   POTASSIUM mmol/L 4 9 4 8   CHLORIDE mmol/L 100 102   CO2 mmol/L 27 25   BUN mg/dL 27* 23   CREATININE mg/dL 0 43* 0 48*   GLUCOSE RANDOM mg/dL 92 102   CALCIUM mg/dL 7 8* 7 8*                  Glucose (mg/dL)   Date Value   11/09/2017 92   11/06/2017 102   10/31/2017 101   10/29/2017 120       Labs reviewed    Physical Examination:  Vitals:   Vitals:    11/08/17 1526 11/09/17 0000 11/09/17 0708 11/09/17 0820   BP: 115/65 130/77 111/74 119/68   Pulse: 60 80 90    Resp: 20 20 20    Temp: 97 7 °F (36 5 °C) 98 4 °F (36 9 °C) 97 9 °F (36 6 °C)    TempSrc: Oral Tympanic Oral    SpO2: 98% 98% 98%    Weight:       Height:           GEN: NAD, resting in bed  HEENT: NC/AT, EOMI  RESP: CTAB, no R/R/W  CV: +S1 S2, regular rate, no rubs, no chest wall tenderness  ABD: soft, NT, ND, normal BS   : No Singh  EXT: No edema  Skin: No rashes  Neuro: AAOx3  Bilat pill rolling tremor  [ X ] Total time spent: 30 Mins and greater than 50% of this time was spent counseling/coordinating care        Chris Serra PA-C  Internal Medicine

## 2017-11-09 NOTE — CONSULTS
Consultation - Neuropsychology    Penelope Peabody 81 y o  female MRN: [id number]  Unit/Bed#: -01 Encounter: [id number]    Assessment/Plan     Assessment:  Pt denied history of major depression or anxiety; pt described frustration and anxiety related to future health outcomes; pt is also grieving the loss of her independence due to current medical issues  Pt reported anxiety over continued health issues and feeling uncertain about how to choose a course of treatment  Pt has a strong [employer] wolfgang and uses prayer to help her cope with negative emotions  Pt lives with her son and has positive family relationships  She reported being motivated to continue rehab program    Dx: J60 15 Adjustment disorder with depressed and anxious mood  Code: 87867    Plan: Pt will be afforded individual therapy sessions while at [facility] to help pt cope with illness  History of Present Illness   Physician Requesting Consult: Bruno Abbott MD  Reason for Consult / Principal Problem: coping, adjustment     Patient is a 81 y o  female   Primary complaints include: agitation, anxiety, concern about health problems, fearfulness, feeling depressed and increased irritability  Psychosocial Stressors: health      Inpatient consult to Neuropsychology  Consult performed by: Luz Marina Breaux ordered by: Bria Jones          Psychiatric Review Of Systems:  sleep: no  appetite changes: yes  weight changes: yes  energy/anergy: yes  interest/pleasure/anhedonia: no  somatic symptoms: yes  anxiety/panic: yes  chhaya: no  guilty/hopeless: no  self injurious behavior/risky behavior: no    Historical Information   Past Psychiatric History:   None    Substance Abuse History:  Use of Alcohol: denied and 0 out of 4 on CAGE    Smoking history: none noted    Family Psychiatric History: none noted    Social History  Education: high school diploma/GED  Marital history:   Living arrangement, social support: The patient lives in home with son, age adult  Occupational History: retired  Functioning Relationships: good support system  Other Pertinent History: None    Traumatic History:   Abuse: none noted  Other Traumatic Events: other traumatic events: multiple medical issues including cancer       Past Medical History:   Diagnosis Date    Arthritis     Asthma     Breast cancer ([address] )     Disease of thyroid gland     Hypertension        Medical Review Of Systems:  Review of Systems    Meds/Allergies   current meds:   Current Facility-Administered Medications   Medication Dose Route Frequency    acetaminophen (TYLENOL) tablet 975 mg  975 mg Oral Q8H [facility]    albuterol (PROVENTIL HFA,VENTOLIN HFA) inhaler 2 puff  2 puff Inhalation Q4H PRN    ALPRAZolam (XANAX) tablet 0 25 mg  0 25 mg Oral BID PRN    amLODIPine (NORVASC) tablet 5 mg  5 mg Oral Daily    bisacodyl (DULCOLAX) rectal suppository 10 mg  10 mg Rectal Daily PRN    dexamethasone (DECADRON) tablet 4 mg  4 mg Oral Q6H [facility]    guaiFENesin (MUCINEX) 12 hr tablet 600 mg  600 mg Oral Q12H [facility]    heparin (porcine) subcutaneous injection 5,000 Units  5,000 Units Subcutaneous Q8H [facility]    lactulose 20 g/30 mL oral solution 20 g  20 g Oral BID    levothyroxine tablet 100 mcg  100 mcg Oral Early Morning    lidocaine (LIDODERM) 5 % patch 1 patch  1 patch Transdermal Daily    menthol-zinc oxide (CALMOSEPTINE) 0 44-20 6 % ointment 1 application  1 application Topical TID    morphine (MS CONTIN) ER tablet 15 mg  15 mg Oral BID    ondansetron (ZOFRAN-ODT) dispersible tablet 4 mg  4 mg Oral Q6H PRN    oxyCODONE (ROXICODONE) immediate release tablet 10 mg  10 mg Oral Q8H PRN    pantoprazole (PROTONIX) EC tablet 40 mg  40 mg Oral Early Morning    polyethylene glycol (MIRALAX) packet 17 g  17 g Oral Daily PRN    pregabalin (LYRICA) capsule 50 mg  50 mg Oral TID    senna-docusate sodium (SENOKOT S) 8 6-50 mg per tablet 1 tablet  1 tablet Oral HS    tolterodine (DETROL LA) 24 hr capsule 4 mg  4 mg Oral Daily     Allergies   Allergen Reactions    Gabapentin GI Intolerance    Hydrocodone GI Intolerance    Ibuprofen Rash       Objective   Vital signs in last 24 hours:  Temp:  [97 9 °F (36 6 °C)-98 4 °F (36 9 °C)] 98 °F (36 7 °C)  HR:  [80-90] 88  Resp:  [20] 20  BP: (111-130)/(68-77) 120/72      Intake/Output Summary (Last 24 hours) at 11/09/17 1651  Last data filed at 11/09/17 1401   Gross per 24 hour   Intake              540 ml   Output              600 ml   Net              -60 ml       Mental Status Evaluation:  Appearance:  age appropriate   Behavior:  normal   Speech:  normal pitch and normal volume   Mood:  labile   Affect:  normal   Language:  WNL   Thought Process:  goal directed and logical   Thought Content:  normal   Perceptual Disturbances: None   Risk Potential: none   Sensorium:  person, place, time/date and situation   Cognition:  grossly intact   Consciousness:  alert and awake    Attention: attention span and concentration were age appropriate   Intellect: within normal limits   Fund of Knowledge: vocabulary: WNL   Insight:  age appropriate   Judgment: age appropriate   Muscle Strength and Tone: see PT eval   Gait/Station: see PT eval   Motor Activity: no abnormal movements

## 2017-11-09 NOTE — PROGRESS NOTES
Physical Medicine and Rehabilitation Progress Note:  Spinal Cord Dysfunction:  Non-Traumatic:  04 130 Other Non-Traumatic Cord Compression  Julianne Womack 81 y o  female MRN: [id number]  Unit/Bed#: -01 Encounter: [id number]    Assessment:   Penelope Peabody is a 81 y o  female who  has a past medical history of Arthritis; Asthma; Breast cancer ([address] ); Disease of thyroid gland; and Hypertension  and [facility] [address] after outpatient MRI demonstrated cord compression  Palliative treatment to spine started  Surgical intervention not recommended due to extent of metastasis  She was accepted to [facility] on 11/03/17  Subjective: No acute events overnight  Eloisa Johnson seen while in her room this AM  Denies any CP today  No SOB, but does endorse continued cough  Denies bringing anything up, but states when she blows her nose, mucous is yellowish-green  I let her know I would start her on a mucous thinner to help bring up any secretions easier  Eloisa Johnson also endorses occasional sharp pain down her lateral left leg, which I noted may be due to tight IT-band which should be stretched during therapies  No other questions or concerns    ROS: A 10-point ROS was performed  Negative except as listed above  Restrictions include:  Fall precautions    Disposition: Reteam    Plan:  # Cord compression, myelopathy  - Acute comprehensive interdisciplinary inpatient rehabilitation including PT, OT, SLP, RN, CM, SW, dietary, psychology, etc   - Appreciate Internal Medicine following - Dr Cheng Howell service      - secondary to spinal mets  - undergoing XRT (total of 10 sessions)  - Continue steroids    # Left lateral leg pain  - likely due to tight IT-band  - will instruct therapy to perform stretching    # Hyponatremia  - continue to monitor    Results from last 7 days  Lab Units 11/09/17  0506 11/06/17  0614   SODIUM mmol/L 133* 134*     # Mild Leukocytosis  - continue to monitor  - no fever or chills noted  - patient is having cough, states mucous is yellowish-green when she blows her nose      Results from last 7 days  Lab Units 11/09/17  0506 11/06/17  0614   WBC Thousand/uL 10 36* 6 64     # Chest pain (resolved)  - EKG performed, per IM team, demonstrates multiple PVCs  - Troponins negative x 3  - Continue to monitor for now      # Metastatic breast CA  - f/u with oncology as outpatient  - noted to have edema to LUE, will require compression garment     # Neuropathy  - continue lyrica     # Anemia (improved)  - Likely reactive  - continue monitoring CBC       Results from last 7 days  Lab Units 11/09/17  0506 11/06/17  0614   HEMOGLOBIN g/dL 11 5 11 9     # HTN  - Continue Amlodipine     Temp:  [97 7 °F (36 5 °C)-98 4 °F (36 9 °C)] 97 9 °F (36 6 °C)  HR:  [60-90] 90  Resp:  [20] 20  BP: (111-130)/(65-77) 119/68    # Hypothyroid  - Continue levothyroxine      # Pain  - Continue tylenol PRN, for max of 3gm daily     - Continue oxycodone   - Continue MS Contin  - This patient record was searched in the [place] PDMP and the risks/benefits of prescribing this patient medications with high abuse/dependence potential was assessed and determined to be medically appropriate at this time  I could not find any prescriptions in [place], [place]  Patient reports she was getting oxycodone 10mg TID (chart review notes she was taking 10mg Q6h PRN), and MS contin 15mg BID      # Rehab Psych  - Continue alprazolam PRN  - Neuropsych consult, appreciate recs     # FENA/prophy  - Diet: cardiac   SLP and nutrition to monitor and adjust as necessary   - DVT prophy: Sequential compression device (Venodyne)  and Heparin  - GI ppx: Pantaprazole  - Bowel: colace , dulcolax suppository  and miralax PRN  - Nausea: Zofran  - Supplements: None  - Sleep: None      CODE: Level 1: Full Code    Objective:  Nursing staff reporting: no problems    Functional Update:  Physical Therapy Occupational Therapy   Weight Bearing Status: Full Weight Bearing  Transfers: Minimal Assistance  Bed Mobility: Supervision  Amulation Distance (ft): 10 feet  Ambulation: Moderate Assistance, Assist of 2  Assistive Device for Ambulation:  (parallel bars)  Wheelchair Mobility Distance: 150 ft  Wheelchair Mobility: Minimal Assistance, Supervision  Discharge Recommendations: Home with:  [address] with[de-identified] 24 Hour Supervision, 24 Hour Assisteance, Family Support, First Floor Setup, Home Physical Therapy   Eating: Supervision  Grooming: Supervision  Bathing: Moderate Assistance  Bathing: Moderate Assistance  Upper Body Dressing: Supervision  Lower Body Dressing: Total Assistance  Toileting: Maximum Assistance  Tub/Shower Transfer: Total Assistance  Toilet Transfer: Total Assistance  Cognition: Within Defined Limits  Orientation: Person, Place, Time, Situation       Allergies and Medications per EMR    Physical Exam:  General: alert, no apparent distress, cooperative and comfortable  HENMT: Head: Normal, normocephalic, atraumatic  Eye: Normal external eye, conjunctiva, lids   Ears: Normal external ears    Nose: Normal external nose, mucus membranes  Pulmonary: chest expansion normal, no retractions, no accessory muscle usage  Abdomen: soft, nontender, nondistended, no masses or organomegaly  Skin/Extremity: no rashes, no erythema, LUE edema   Neurologic: Awake alert orientedx3  Psych: normal mood, behavior, speech, dress, and thought processes    Diagnostic Studies:  Reviewed, no new imaging    Vitals: Reviewed   Temp:  [97 7 °F (36 5 °C)-98 4 °F (36 9 °C)] 97 9 °F (36 6 °C)  HR:  [60-90] 90  Resp:  [20] 20  BP: (111-130)/(65-77) 119/68   Intake/Output Summary (Last 24 hours) at 11/09/17 0933  Last data filed at 11/09/17 0901   Gross per 24 hour   Intake              660 ml   Output              350 ml   Net              310 ml        Laboratory: Reviewed  Lab Results   Component Value Date    HGB 11 5 11/09/2017    HCT 34 0 (L) 11/09/2017    WBC 10 36 (H) 11/09/2017     Lab Results Component Value Date    BUN 27 (H) 11/09/2017     (L) 11/09/2017    K 4 9 11/09/2017     11/09/2017    GLUCOSE 92 11/09/2017    CREATININE 0 43 (L) 11/09/2017     No results found for: PROTIME, INR     Patient Active Problem List   Diagnosis    Cervical cord compression with myelopathy (HCC)    [facility] [address] [address]    Essential hypertension    Hypothyroidism       ** Please Note: Fluency Direct voice to text software may have been used in the creation of this document  **    [ x ] Total time spent: 30 Mins, and greater than 50% of this time was spent counseling/coordinating care

## 2017-11-09 NOTE — PROGRESS NOTES
11/09/17 1230   Pain Assessment   Pain Assessment 0-10   Pain Score 3   Pain Type Acute pain   Pain Location Back   Pain Orientation Left   Hospital Pain Intervention(s) Medication (See MAR); Repositioned; Rest   Response to Interventions pt reports pain is very tolerable right now   Restrictions/Precautions   Precautions Fall Risk;Limb alert;Pressure Ulcer   Cognition   Arousal/Participation Alert; Cooperative   Subjective   Subjective Pt reports she does not think her R leg will ever get better   QI: Roll Left and Right   Assistance Needed Supervision   Assistance Provided by [place] No physical assistance   Roll Left and Right CARE Score 4   QI: Sit to [address] Provided by [place] No physical assistance   Sit to Lying CARE Score 4   QI: Lying to Sitting on Side of Bed   Assistance Needed Supervision   Assistance Provided by [place] No physical assistance   Lying to Sitting on Side of Bed CARE Score 4   QI: Sit to Stand   Assistance Needed Physical assistance   Assistance Provided by [place] Less than 25%   Sit to Stand CARE Score 3   QI: Chair/Bed-to-Chair Transfer   Assistance Needed Incidental touching   Assistance Provided by [place] No physical assistance   Chair/Bed-to-Chair Transfer CARE Score 4   Transfer Bed/Chair/Wheelchair   Limitations Noted In Balance; Coordination; Endurance;LE Strength   Adaptive Equipment Roller Walker   Sit Pivot Contact Guard   Sit to Stand Minimal Assist   Stand to Sit Minimal Assist   Supine to Sit Supervision   Sit to Supine Supervision   Bed, Chair, Wheelchair Transfer (FIM) 2 - [place] needs to lift or boost to rise AND assist to sit   Ambulation   Does the patient walk? 2  Yes   Primary Discharge Mode of Locomotion Wheelchair   Walk Assist Level Maximum Assist   Gait Pattern Ataxic;R knee lolis;Decreased foot clearance; Slow Barb; Inconsistant Barb; Improper weight shift   Assist Device Sheryl Otero Walked (feet) 5 ft Limitations Noted In Balance; Coordination;Device Management; Endurance; Heel Strike; Safety; Sequencing;Speed;Strength;Swing   Walking (FIM) 1 - Patient requires assist of two people   QI: Wheel 50 Feet with [address] Provided by [place] No physical assistance   Wheel 50 Feet with Two Turns CARE Score 4   QI: Wheel 150 [address] Provided by [place] No physical assistance   Wheel 150 Feet CARE Score 4   Wheelchair mobility   QI: Does the patient use a wheelchair? 1  Yes   QI: Indicate the type of wheelchair 1  Manual   Wheelchair Assist Level Supervision   Method Right upper extremity; Left upper extremity   Needs Assist With Remove Leg Rest;Replace Leg Rest;Remove armrests;Replace armrests; Locking Brakes;Obstacles   Distance Level Surface (feet) 150 ft   Wheelchair (FIM) 5 - Patient requires supervision/monitoring AND wheels distance 150 feet or more, no rest   Therapeutic Interventions   Neuromuscular Re-Education supine on mat: bridges; R SAQ; R hip abduction; R heel slides; hooklying ball squeezes; hooklying R ER against slight resistance   Other practiced sit pivots from mat <> w/c 6x    Equipment Use   LE Bike x 15 min  Assessment   Treatment Assessment Pt cont to show improvements in ability to perform sit pivot transfers; pt has progressed to CG level and demo G safety awareness; pt makes sure to check position of RLE prior to initiating transfer  Pt cont to present with impaired coordination RLE and requires manual assist during exercises to perform smooth, controlled motion  Trialed pt ambulating with RW; only went 5' and then terminated 2* dec safety due to R knee buckling, dec foot clearance, inc trunk flexion; recommend continued practice in parallel bars  Pt will cont to benefit from skilled PT to further progress independence with w/c mobility and sit pivots to ensure safe D/C home      Barriers to Discharge Inaccessible home environment   PT Barriers   Physical Impairment Decreased strength;Decreased endurance; Impaired balance;Decreased mobility; Decreased coordination;Decreased skin integrity;Pain   Functional Limitation Car transfers; Ramp negotiation;Stair negotiation;Standing;Walking;Transfers; Wheelchair management   Plan   Treatment/Interventions Functional transfer training;LE strengthening/ROM; Therapeutic exercise; Endurance training;Patient/family training;Equipment eval/education; Bed mobility;Gait training   Progress Progressing toward goals   Recommendation   Recommendation Home with family support;24 hour supervision/assist;Home PT   Equipment Recommended Wheelchair   PT Therapy Minutes   PT Time In 1230   PT Time Out 1400   PT Total Time (minutes) 90   PT Mode of treatment - Individual (minutes) 90   PT Mode of treatment - Concurrent (minutes) 0   PT Mode of treatment - Group (minutes) 0   PT Mode of treatment - Co-treat (minutes) 0   PT Mode of Teatment - Total time(minutes) 90 minutes   Therapy Time missed   Time missed?  No

## 2017-11-10 PROCEDURE — 77387 GUIDANCE FOR RADJ TX DLVR: CPT | Performed by: RADIOLOGY

## 2017-11-10 PROCEDURE — 97535 SELF CARE MNGMENT TRAINING: CPT | Performed by: OCCUPATIONAL THERAPY ASSISTANT

## 2017-11-10 PROCEDURE — 97112 NEUROMUSCULAR REEDUCATION: CPT | Performed by: PHYSICAL THERAPIST

## 2017-11-10 PROCEDURE — 97530 THERAPEUTIC ACTIVITIES: CPT | Performed by: PHYSICAL THERAPIST

## 2017-11-10 PROCEDURE — 77412 RADIATION TX DELIVERY LVL 3: CPT | Performed by: RADIOLOGY

## 2017-11-10 PROCEDURE — 97110 THERAPEUTIC EXERCISES: CPT | Performed by: OCCUPATIONAL THERAPY ASSISTANT

## 2017-11-10 PROCEDURE — 97110 THERAPEUTIC EXERCISES: CPT | Performed by: PHYSICAL THERAPIST

## 2017-11-10 PROCEDURE — 97530 THERAPEUTIC ACTIVITIES: CPT | Performed by: OCCUPATIONAL THERAPY ASSISTANT

## 2017-11-10 RX ORDER — PREGABALIN 50 MG/1
50 CAPSULE ORAL 2 TIMES DAILY
Status: DISCONTINUED | OUTPATIENT
Start: 2017-11-10 | End: 2017-11-15

## 2017-11-10 RX ADMIN — MORPHINE SULFATE 15 MG: 15 TABLET, EXTENDED RELEASE ORAL at 19:52

## 2017-11-10 RX ADMIN — TOLTERODINE TARTRATE 4 MG: 4 CAPSULE, EXTENDED RELEASE ORAL at 08:45

## 2017-11-10 RX ADMIN — HEPARIN SODIUM 5000 UNITS: 5000 INJECTION, SOLUTION INTRAVENOUS; SUBCUTANEOUS at 05:12

## 2017-11-10 RX ADMIN — OXYCODONE HYDROCHLORIDE 10 MG: 10 TABLET ORAL at 05:12

## 2017-11-10 RX ADMIN — GUAIFENESIN 600 MG: 600 TABLET, EXTENDED RELEASE ORAL at 22:20

## 2017-11-10 RX ADMIN — ACETAMINOPHEN 975 MG: 325 TABLET, FILM COATED ORAL at 22:20

## 2017-11-10 RX ADMIN — LEVOTHYROXINE SODIUM 100 MCG: 100 TABLET ORAL at 05:11

## 2017-11-10 RX ADMIN — PREGABALIN 50 MG: 50 CAPSULE ORAL at 08:44

## 2017-11-10 RX ADMIN — PREGABALIN 50 MG: 50 CAPSULE ORAL at 19:52

## 2017-11-10 RX ADMIN — ACETAMINOPHEN 975 MG: 325 TABLET, FILM COATED ORAL at 14:19

## 2017-11-10 RX ADMIN — ALPRAZOLAM 0.25 MG: 0.25 TABLET ORAL at 14:32

## 2017-11-10 RX ADMIN — LIDOCAINE 1 PATCH: 50 PATCH CUTANEOUS at 10:43

## 2017-11-10 RX ADMIN — Medication 1 TABLET: at 22:19

## 2017-11-10 RX ADMIN — MORPHINE SULFATE 15 MG: 15 TABLET, EXTENDED RELEASE ORAL at 08:44

## 2017-11-10 RX ADMIN — PANTOPRAZOLE SODIUM 40 MG: 40 TABLET, DELAYED RELEASE ORAL at 05:12

## 2017-11-10 RX ADMIN — DEXAMETHASONE 4 MG: 4 TABLET ORAL at 05:11

## 2017-11-10 RX ADMIN — GUAIFENESIN 600 MG: 600 TABLET, EXTENDED RELEASE ORAL at 08:44

## 2017-11-10 RX ADMIN — LACTULOSE 20 G: 20 SOLUTION ORAL at 10:42

## 2017-11-10 RX ADMIN — HEPARIN SODIUM 5000 UNITS: 5000 INJECTION, SOLUTION INTRAVENOUS; SUBCUTANEOUS at 14:18

## 2017-11-10 RX ADMIN — DEXAMETHASONE 4 MG: 4 TABLET ORAL at 23:54

## 2017-11-10 RX ADMIN — DEXAMETHASONE 4 MG: 4 TABLET ORAL at 19:53

## 2017-11-10 RX ADMIN — DEXAMETHASONE 4 MG: 4 TABLET ORAL at 11:36

## 2017-11-10 RX ADMIN — AMLODIPINE BESYLATE 5 MG: 5 TABLET ORAL at 08:44

## 2017-11-10 RX ADMIN — HEPARIN SODIUM 5000 UNITS: 5000 INJECTION, SOLUTION INTRAVENOUS; SUBCUTANEOUS at 22:19

## 2017-11-10 RX ADMIN — POLYETHYLENE GLYCOL 3350 17 G: 17 POWDER, FOR SOLUTION ORAL at 22:57

## 2017-11-10 RX ADMIN — ACETAMINOPHEN 975 MG: 325 TABLET, FILM COATED ORAL at 05:12

## 2017-11-10 RX ADMIN — ONDANSETRON 4 MG: 4 TABLET, ORALLY DISINTEGRATING ORAL at 20:47

## 2017-11-10 RX ADMIN — ALPRAZOLAM 0.25 MG: 0.25 TABLET ORAL at 08:43

## 2017-11-10 NOTE — PROGRESS NOTES
11/10/17 1030   Pain Assessment   Pain Assessment 0-10   Pain Score 4   Pain Type Acute pain   Pain Location Shoulder;Neck   Pain Orientation Left   Hospital Pain Intervention(s) Medication (See MAR); Repositioned   Response to Interventions pt given lidocaine patch @ start of session; pt reports some relief with towel roll supporting neck    Restrictions/Precautions   Precautions Fall Risk;Limb alert;Pressure Ulcer   Cognition   Arousal/Participation Alert; Cooperative   Subjective   Subjective Pt with no complaints @ this time; ready for therapy    QI: Roll Left and Right   Assistance Needed Supervision   Assistance Provided by [place] No physical assistance   Roll Left and Right CARE Score 4   QI: Sit to [address] Provided by [place] No physical assistance   Sit to Lying CARE Score 4   QI: Lying to Sitting on Side of Bed   Assistance Needed Supervision   Assistance Provided by [place] No physical assistance   Lying to Sitting on Side of Bed CARE Score 4   QI: Sit to Stand   Assistance Needed Incidental touching   Assistance Provided by [place] No physical assistance   Sit to Stand CARE Score 4   QI: Chair/Bed-to-Chair Transfer   Assistance Needed Incidental touching;Supervision   Assistance Provided by [place] No physical assistance   Chair/Bed-to-Chair Transfer CARE Score 4   Transfer Bed/Chair/Wheelchair   Limitations Noted In Balance; Coordination; Endurance;LE Strength   Adaptive Equipment Roller Walker   Sit Pivot Contact Guard;Supervision   Sit to [employer]   Stand to [facility]   Supine to Sit Supervision   Sit to Supine Supervision   Bed, Chair, Wheelchair Transfer (FIM) 4 - Patient requires steadying assist or light touching   QI: Wheel 50 Feet with [address] Provided by [place] No physical assistance   Wheel 50 Feet with Two Turns CARE Score 4   QI: Wheel [facility] Provided by [place] No physical assistance   Wheel 150 Feet CARE Score 4   Wheelchair mobility   QI: Does the patient use a wheelchair? 1  Yes   QI: Indicate the type of wheelchair 1  Manual   Wheelchair Assist Level Supervision   Method Right upper extremity; Left upper extremity   Needs Assist With Remove Leg Rest;Replace Leg Rest;Remove armrests;Replace armrests; Locking Brakes;Obstacles   Distance Level Surface (feet) 150 ft  (x2)   Wheelchair (FIM) 5 - Patient requires supervision/monitoring AND wheels distance 150 feet or more, no rest   Therapeutic Interventions   Neuromuscular Re-Education supine on mat: bridges, bridges with ball b/w knees, hooklying ball squeezes, sidelying clamshells, R heel slides, R hip abduction    Equipment Use   LE Bike x 15 min  Assessment   Treatment Assessment Pt cont to show improvements in ability to perform sit pivots; pt performs @ CG level and progressed to CS by end of session  Reviewed proper mechanics of transfer and positioning of feet; pt demo G safety awareness and G carryover of techniqe  Pt will require practice managing armrests and leg rests; will also perform FT session to educate son how to assist her with w/c management  Pt cont to have significant coordination/motor control deficits in RLE; pt requires guidance from therapist during mat exercises to perform smooth, controlled motion; pt also requires stability @ RLE during bridges to perform correctly  Cont to focus on sit pivots, w/c mobility, mat program to improve RLE coordination and control  Will re-assess gait in a few days to assess for RLE improvements  Pt will cont to benefit from skilled PT to further progress overall functional mobility  Barriers to Discharge Inaccessible home environment   PT Barriers   Physical Impairment Decreased strength;Decreased endurance; Impaired balance;Decreased mobility; Decreased coordination;Pain;Decreased skin integrity   Functional Limitation Car transfers; Ramp negotiation;Stair negotiation;Standing;Transfers; Walking; Wheelchair management   Plan   Treatment/Interventions Functional transfer training;LE strengthening/ROM; Therapeutic exercise; Endurance training;Patient/family training;Equipment eval/education; Bed mobility;Gait training   Progress Progressing toward goals   Recommendation   Recommendation Home with family support;24 hour supervision/assist;Home PT   Equipment Recommended Wheelchair   PT Therapy Minutes   PT Time In 1030   PT Time Out 1130   PT Total Time (minutes) 60   PT Mode of treatment - Individual (minutes) 60   PT Mode of treatment - Concurrent (minutes) 0   PT Mode of treatment - Group (minutes) 0   PT Mode of treatment - Co-treat (minutes) 0   PT Mode of Teatment - Total time(minutes) 60 minutes   Therapy Time missed   Time missed?  No

## 2017-11-10 NOTE — PROGRESS NOTES
Physical Medicine and Rehabilitation Progress Note:  Spinal Cord Dysfunction:  Non-Traumatic:  04 130 Other Non-Traumatic Cord Compression  Clinton Womack 81 y o  female MRN: [id number]  Unit/Bed#: -01 Encounter: [id number]    Assessment:   Darcie Nguyen is a 81 y o  female who  has a past medical history of Arthritis; Asthma; Breast cancer ([address] ); Disease of thyroid gland; and Hypertension  and [facility] [address] [facility] outpatient MRI demonstrated cord compression  Palliative treatment to spine started  Surgical intervention not recommended due to extent of metastasis  She was accepted to [facility] on 11/03/17  Subjective: No acute events overnight  Abby Srivastava seen while in her room this AM  Denies any CP or SOB  Inquiring whether Lyrica dose can be reduced as she states neuropathy has not been causing any issues, and she feels as if it has made her weaker in the past 3 months  We discussed the side effects of lyrica as well as benefits of lyrica; I let her know we could reduce her dose to BID, and she will let me know how she tolerates it  ROS: A 10-point ROS was performed  Negative except as listed above  Restrictions include:  Fall precautions    Disposition: Reteam    Plan:  # Cord compression, myelopathy  - Acute comprehensive interdisciplinary inpatient rehabilitation including PT, OT, SLP, RN, CM, SW, dietary, psychology, etc   - Appreciate Internal Medicine following - Dr Mimi Baxter service      - secondary to spinal mets  - undergoing XRT (total of 10 sessions)  - Continue steroids    # Left lateral leg pain  - likely due to tight IT-band  - will instruct therapy to perform stretching    # Hyponatremia  - continue to monitor    Results from last 7 days  Lab Units 11/09/17  0506 11/06/17  0614   SODIUM mmol/L 133* 134*     # Mild Leukocytosis  - continue to monitor  - no fever or chills noted  - patient is having cough, states mucous is yellowish-green when she blows her nose  Started patient on mucinex to help thin out mucous  Results from last 7 days  Lab Units 11/09/17  0506 11/06/17  0614   WBC Thousand/uL 10 36* 6 64     # Chest pain (resolved)  - EKG performed, per IM team, demonstrates multiple PVCs  - Troponins negative x 3  - Continue to monitor for now      # Metastatic breast CA  - f/u with oncology as outpatient  - noted to have edema to LUE, will require compression garment     # Neuropathy  - continue lyrica     # Anemia (improved)  - Likely reactive  - continue monitoring CBC       Results from last 7 days  Lab Units 11/09/17  0506 11/06/17  0614   HEMOGLOBIN g/dL 11 5 11 9     # HTN  - Continue Amlodipine     Temp:  [97 6 °F (36 4 °C)-98 °F (36 7 °C)] 97 9 °F (36 6 °C)  HR:  [79-88] 80  Resp:  [20] 20  BP: (110-120)/(69-72) 116/70    # Hypothyroid  - Continue levothyroxine      # Pain  - Continue tylenol PRN, for max of 3gm daily     - Continue oxycodone   - Continue MS Contin  - This patient [place] was searched in the [place]MP and the risks/benefits of prescribing this patient medications with high abuse/dependence potential was assessed and determined to be medically appropriate at this time  I could not find any prescriptions in [place], [place]  Patient reports she was getting oxycodone 10mg TID (chart review notes she was taking 10mg Q6h PRN), and MS contin 15mg BID      # Rehab Psych  - Continue alprazolam PRN  - Neuropsych consult, appreciate recs     # FENA/prophy  - Diet: cardiac   SLP and nutrition to monitor and adjust as necessary   - DVT prophy: Sequential compression device (Venodyne)  and Heparin  - GI ppx: Pantaprazole  - Bowel: colace , dulcolax suppository  and miralax PRN  - Nausea: Zofran  - Supplements: None  - Sleep: None      CODE: Level 1: Full Code    Objective:  Nursing staff reporting: no problems    Functional Update:  Physical Therapy Occupational Therapy   Weight Bearing Status: Full Weight Bearing  Transfers: Minimal Assistance  Bed Mobility: Supervision  Amulation Distance (ft): 10 feet  Ambulation: Moderate Assistance, Assist of 2  Assistive Device for Ambulation:  (parallel bars)  Wheelchair Mobility Distance: 150 ft  Wheelchair Mobility: Minimal Assistance, Supervision  Discharge Recommendations: Home with:  [address] with[de-identified] 24 Hour Supervision, 24 Hour Assisteance, Family Support, First Floor Setup, Home Physical Therapy   Eating: Supervision  Grooming: Supervision  Bathing: Moderate Assistance  Bathing: Moderate Assistance  Upper Body Dressing: Supervision  Lower Body Dressing: Total Assistance  Toileting: Maximum Assistance  Tub/Shower Transfer: Total Assistance  Toilet Transfer: Total Assistance  Cognition: Within Defined Limits  Orientation: Person, Place, Time, Situation       Allergies and Medications per EMR    Physical Exam:  General: alert, no apparent distress, cooperative and comfortable  HENMT: Head: Normal, normocephalic, atraumatic  Eye: Normal external eye, conjunctiva, lids   Ears: Normal external ears    Nose: Normal external nose, mucus membranes  Pulmonary: chest expansion normal, no retractions, no accessory muscle usage  Abdomen: soft, nontender, nondistended, no masses or organomegaly  Skin/Extremity: no rashes, no erythema, LUE edema   Neurologic: Awake alert orientedx3  Psych: normal mood, behavior, speech, dress, and thought processes    Diagnostic Studies:  Reviewed, no new imaging    Vitals: Reviewed   Temp:  [97 6 °F (36 4 °C)-98 °F (36 7 °C)] 97 9 °F (36 6 °C)  HR:  [79-88] 80  Resp:  [20] 20  BP: (110-120)/(69-72) 116/70   Intake/Output Summary (Last 24 hours) at 11/10/17 0944  Last data filed at 11/10/17 0941   Gross per 24 hour   Intake              420 ml   Output             1000 ml   Net             -580 ml        Laboratory: Reviewed  Lab Results   Component Value Date    HGB 11 5 11/09/2017    HCT 34 0 (L) 11/09/2017    WBC 10 36 (H) 11/09/2017     Lab Results   Component Value Date    BUN 27 (H) 11/09/2017     (L) 11/09/2017    K 4 9 11/09/2017     11/09/2017    GLUCOSE 92 11/09/2017    CREATININE 0 43 (L) 11/09/2017     No results found for: PROTIME, INR     Patient Active Problem List   Diagnosis    Cervical cord compression with myelopathy (HCC)    [facility] [address] [address]    Essential hypertension    Hypothyroidism       ** Please Note: Fluency Direct voice to text software may have been used in the creation of this document  **    [ x ] Total time spent: 30 Mins, and greater than 50% of this time was spent counseling/coordinating care

## 2017-11-10 NOTE — PROGRESS NOTES
11/10/17 1230   Pain Assessment   Pain Assessment 0-10   Pain Score 4   Pain Type Acute pain   Pain Location Shoulder;Neck   Pain Orientation Left   Hospital Pain Intervention(s) Medication (See MAR); Repositioned   Response to Interventions pt reports pain is minimal and can tolerate therapy session   Restrictions/Precautions   Precautions Fall Risk;Limb alert;Pressure Ulcer   Cognition   Arousal/Participation Alert; Cooperative   Subjective   Subjective Pt agreeable to therapy session   QI: Sit to Stand   Assistance Needed Physical assistance   Assistance Provided by [place] Less than 25%   Sit to Stand CARE Score 3   Transfer Bed/Chair/Wheelchair   Limitations Noted In Balance; Coordination; Endurance;LE Strength   Adaptive Equipment Roller Walker   Sit to Stand Minimal Assist   Stand to Sit Minimal Assist;Moderate Assist   Bed, Chair, Wheelchair Transfer (FIM) 2 - [place] needs to lift or boost to rise AND assist to sit   Assessment   Treatment Assessment Pt participated in skilled PT/OT co-treat session focusing on standing tolerance and standing balance  PT focused on blocking R knee and positioning R foot, as well as weight shifting pt onto RLE more during standing activities  Pt engaged in bean bag toss with unilateral release from RW; pt tends to lean too far forward, cues to shift weight posteriorly; pt had 1 LOB requiring mod-max A to correct  Pt also tends to rush through activities; required cueing to make sure she is steady and to focus on her balance first before attempting unilateral release  Pt also engaged in tabletop connect 4 game with R knee block and weight shifting onto RLE more  Pt has poor control over descent into chair; pt does not reach back for armrests of chair when lowering herself  Overall pt cont to present with standing balance deficits and RLE deficits which limit progression with standing and functional mobility   Pt will cont to benefit from skilled PT to further improve functional mobility  Barriers to Discharge Inaccessible home environment   PT Barriers   Physical Impairment Decreased strength;Decreased endurance; Impaired balance;Decreased mobility; Decreased coordination;Pain;Decreased skin integrity   Functional Limitation Car transfers; Ramp negotiation;Stair negotiation;Standing;Transfers; Walking; Wheelchair management   Plan   Treatment/Interventions Functional transfer training;LE strengthening/ROM; Therapeutic exercise; Endurance training;Patient/family training;Equipment eval/education; Bed mobility   Progress Progressing toward goals   Recommendation   Recommendation Home with family support;24 hour supervision/assist;Home PT   Equipment Recommended Wheelchair   PT Therapy Minutes   PT Time In 1230   PT Time Out 1300   PT Total Time (minutes) 30   PT Mode of treatment - Individual (minutes) 0   PT Mode of treatment - Concurrent (minutes) 0   PT Mode of treatment - Group (minutes) 0   PT Mode of treatment - Co-treat (minutes) 30   PT Mode of Teatment - Total time(minutes) 30 minutes   Therapy Time missed   Time missed?  No

## 2017-11-10 NOTE — PROGRESS NOTES
Internal Medicine Progress Note  Patient: Penelope Peabody  Age/sex: 81 y o  female  Medical Record #: [id number]      ASSESSMENT/PLAN:  Penelope Peabody is seen and examined and mangement for following issues:    1  Cervical cord compression with myelopathy/Mass at [facility]: Likely related to metastatic disease  Currently undergoing XRT for 10 sessions to lumbar and cervical spine  Continue Decadron 4mg Q6 hours  Pain control  Therapy per primary team    2  Metastatic breast cancer: patient with moderate left pleural effusion, multiple hepatic lesions, possible adrenal mass and multiple bony lesions  Overall prognosis poor  Outpatient follow up with primary oncologist  Previously on Xeloda  3  Left pleural effusion: likely malignant  Monitor respiratory status  Currently denies complaints of SOB  4  Hypertension: continue norvasc and monitor blood pressure  5  Hypothyroidism: continue levothyroxine  6  Peripheral neuropathy: continue Lyrica  7  DVT prophylaxis: SQ heparin  8  Hyponatremia:  Mild at 133  Asymptomatic  Will monitor  9  Overactive bladder:  Detrol LA started  10  Chest pain: All VSS/ EKG reveals frequent PVCs otherwise WNL, Troponin negative x 3  Anxiolytic administered  ? Anxiety related, vs from bony mets? No further pain reported               Subjective: Patient seen and examined  Pt reports no further chest pain or tightness  She occasionally gets sharp pains that radiate down her LLE  No N/V/SOB   Denies current complaints    ROS:   GI: denies abdominal pain, change bowel habits or reflux symptoms  Neuro: No new neurologic changes  Respiratory: No Cough, SOB  Cardiovascular: No CP, palpitations     Scheduled Meds:    acetaminophen 975 mg Oral Q8H [address]   amLODIPine 5 mg Oral Daily   dexamethasone 4 mg Oral Q6H EDWIN   guaiFENesin 600 mg Oral Q12H [address]   heparin (porcine) 5,000 Units Subcutaneous Q8H [address]   lactulose 20 g Oral BID   levothyroxine 100 mcg Oral Early Morning   lidocaine 1 patch Transdermal Daily   morphine 15 mg Oral BID   pantoprazole 40 mg Oral Early Morning   pregabalin 50 mg Oral TID   senna-docusate sodium 1 tablet Oral HS   tolterodine 4 mg Oral Daily       Labs:       Results from last 7 days  Lab Units 11/09/17  0506 11/06/17  0614   WBC Thousand/uL 10 36* 6 64   HEMOGLOBIN g/dL 11 5 11 9   HEMATOCRIT % 34 0* 35 8   PLATELETS Thousands/uL 206 232       Results from last 7 days  Lab Units 11/09/17  0506 11/06/17  0614   SODIUM mmol/L 133* 134*   POTASSIUM mmol/L 4 9 4 8   CHLORIDE mmol/L 100 102   CO2 mmol/L 27 25   BUN mg/dL 27* 23   CREATININE mg/dL 0 43* 0 48*   GLUCOSE RANDOM mg/dL 92 102   CALCIUM mg/dL 7 8* 7 8*                  Glucose (mg/dL)   Date Value   11/09/2017 92   11/06/2017 102   10/31/2017 101   10/29/2017 120       Labs reviewed    Physical Examination:  Vitals:   Vitals:    11/09/17 0820 11/09/17 1550 11/09/17 2357 11/10/17 0713   BP: 119/68 120/72 111/69 110/71   Pulse:  88 79 80   Resp:  20 20 20   Temp:  98 °F (36 7 °C) 97 6 °F (36 4 °C) 97 9 °F (36 6 °C)   TempSrc:  Oral Oral Oral   SpO2:  95% 97% 100%   Weight:       Height:           GEN: NAD, resting in bed  HEENT: NC/AT, EOMI  RESP: CTAB, no R/R/W  CV: +S1 S2, regular rate, no rubs, no chest wall tenderness  ABD: soft, NT, ND, normal BS   : No Singh  EXT: No edema  Skin: No rashes  Neuro: AAOx3  Bilat pill rolling tremor  [ X ] Total time spent: 30 Mins and greater than 50% of this time was spent counseling/coordinating care        Shirin Jung PA-C  Internal Medicine

## 2017-11-10 NOTE — PROGRESS NOTES
Occupational Therapy Treatment Note:       11/10/17 1231   Pain Assessment   Pain Assessment 0-10   Pain Score 4   Pain Location Neck; Shoulder   Pain Orientation Left   Hospital Pain Intervention(s) Medication (See MAR); Repositioned   Restrictions/Precautions   Precautions Fall Risk;Limb alert;Pain;Pressure Ulcer   Weight Bearing Restrictions No   ROM Restrictions No   Dressing/Undressing Clothing   Remove UB Clothes Pullover Shirt   Remove LB Clothes Pants;Socks; Shoes   Don UB Clothes Other  [facility] gown  )   Don LB Clothes Socks   Limitations Noted In Balance; Coordination; Endurance;Problem Solving; Safety;Strength;ROM   Adaptive Equipment Reacher;Sock Aide; Other  (RW)   Positioning Supported Sit;Standing   Findings Assisted pt with getting dressed into hospital gown for radiation tx  Pt able to doff overhead shirt with S  Instructed pt on using LHAE for LB ADL, in which pt required step by step VCs post initial demonstration throughout for optimal success  Pt able to doff shoes using "kick off" method with S  Pt able to doff socks and pants over feet while seated using reacher with CS  Pt able to don socks using sock aide with Andreas; noted difficulty maintain grasp onto drawstring with L hand; pt may benefit from sock aide drawstring with built up handle  Instructed pt on alternating unilateral hand release while in stance at [facility] for clothing mgmt over hips, in which pt required min/modA for balance and blocking of R knee  QI: Sit to [address] Provided by [place] No physical assistance   Sit to Lying CARE Score 4   QI: Sit to Stand   Assistance Needed Adaptive equipment;Set-up / Rachel Harrison; Verbal cues; Physical assistance   Assistance Provided by [place] Less than 25%   Comment RW; VCs for hand placement and blocking of R knee  Sit to Stand CARE Score 3   QI: Chair/Bed-to-Chair Transfer   Assistance Needed Jose Leija / Rachel Harrison; Verbal cues; Physical assistance   Assistance Provided by [place] Less than 25%   Comment Sit pivot transfer  Chair/Bed-to-Chair Transfer CARE Score 3   Transfer Bed/Chair/Wheelchair   Limitations Noted In Balance;Confidence; Coordination; Endurance;Problem Solving; Sequencing;UE Strength;LE Strength   Adaptive Equipment Roller Walker   Sit Pivot Minimal Assist   Sit to Stand Moderate Assist   Stand to Sit Minimal   Sit to Supine Supervision   Bed, Chair, Wheelchair Transfer (FIM) 2 - [place] needs to lift or boost to rise AND assist to sit   Functional Standing Tolerance   Time approx 2 mins dynamic and 5 mins static   Activity Bean bag toss unilaterally supported at RW, alternating unilateral hand release; table top activity unilaterally supported, alternating unilateral hand release   Comments VCs to achieve centered stance, to weight shift to R due to L sided lean noted, and assist for blocking of R knee due to noted buckling; LOB noted 1x during bean bag toss warranted mod/maxA to regain  Exercise Tools   Exercise Tools Yes   Other Exercise Tool 1 Pt completed 14# bilat yeimi exercises in various planes at 3 sets/10 reps; encouraged pt to perform with UB unsupported and upright posture in order to improve upon core strengthening as well  Other Exercise Tool 2 Pt completed L hand  and digit strengthening exercises in various planes using blue foam block  Cognition   Overall Cognitive Status WFL   Arousal/Participation Alert; Cooperative   Attention Within functional limits   Orientation Level Oriented X4   Memory Decreased short term memory;Decreased recall of recent events;Decreased recall of precautions   Following Commands Follows one step commands with increased time or repetition   Activity Tolerance   Activity Tolerance Patient tolerated treatment well  (With rest breaks  )   Assessment   Treatment Assessment OT tx sessions focused on transfers, standing balance/tolerance and LE strengthening, bed mobility, UB/LB dressing tasks, UB/core strengthening, L hand  and digit strengthening, and overall activity tolerance/endurance  Refer above for details on pt performance  Pt would benefit from continued skilled OT services in order to achieve highest functional abilities  Prognosis Fair   Problem List Decreased strength;Decreased range of motion;Decreased endurance; Impaired balance;Decreased mobility; Decreased coordination;Decreased safety awareness; Impaired sensation;Pain   Barriers to Discharge Inaccessible home environment   Plan   Treatment/Interventions ADL retraining;Functional transfer training;LE strengthening/ROM; Therapeutic exercise; Endurance training;Patient/family training;Equipment eval/education; Bed mobility; Compensatory technique education;OT   Progress Progressing toward goals   OT Therapy Minutes   OT Time In 1230   OT Time Out 1400   OT Total Time (minutes) 90   OT Mode of treatment - Individual (minutes) 60   OT Mode of treatment - Concurrent (minutes) 30   OT Mode of treatment - Group (minutes) 0   OT Mode of treatment - Co-treat (minutes) 0   OT Mode of Teatment - Total time(minutes) 90 minutes   Therapy Time missed   Time missed?  No   Kimber Meyer, [address]

## 2017-11-11 PROCEDURE — 97530 THERAPEUTIC ACTIVITIES: CPT

## 2017-11-11 PROCEDURE — 97112 NEUROMUSCULAR REEDUCATION: CPT

## 2017-11-11 PROCEDURE — 97530 THERAPEUTIC ACTIVITIES: CPT | Performed by: STUDENT IN AN ORGANIZED HEALTH CARE EDUCATION/TRAINING PROGRAM

## 2017-11-11 PROCEDURE — 97110 THERAPEUTIC EXERCISES: CPT

## 2017-11-11 RX ADMIN — LIDOCAINE 1 PATCH: 50 PATCH CUTANEOUS at 09:08

## 2017-11-11 RX ADMIN — TOLTERODINE TARTRATE 4 MG: 4 CAPSULE, EXTENDED RELEASE ORAL at 11:05

## 2017-11-11 RX ADMIN — HEPARIN SODIUM 5000 UNITS: 5000 INJECTION, SOLUTION INTRAVENOUS; SUBCUTANEOUS at 05:48

## 2017-11-11 RX ADMIN — POLYETHYLENE GLYCOL 3350 17 G: 17 POWDER, FOR SOLUTION ORAL at 09:05

## 2017-11-11 RX ADMIN — Medication 1 TABLET: at 22:12

## 2017-11-11 RX ADMIN — ACETAMINOPHEN 975 MG: 325 TABLET, FILM COATED ORAL at 22:11

## 2017-11-11 RX ADMIN — LEVOTHYROXINE SODIUM 100 MCG: 100 TABLET ORAL at 05:48

## 2017-11-11 RX ADMIN — ACETAMINOPHEN 975 MG: 325 TABLET, FILM COATED ORAL at 05:48

## 2017-11-11 RX ADMIN — PREGABALIN 50 MG: 50 CAPSULE ORAL at 09:07

## 2017-11-11 RX ADMIN — GUAIFENESIN 600 MG: 600 TABLET, EXTENDED RELEASE ORAL at 22:12

## 2017-11-11 RX ADMIN — AMLODIPINE BESYLATE 5 MG: 5 TABLET ORAL at 09:06

## 2017-11-11 RX ADMIN — LACTULOSE 20 G: 20 SOLUTION ORAL at 09:05

## 2017-11-11 RX ADMIN — LACTULOSE 20 G: 20 SOLUTION ORAL at 17:24

## 2017-11-11 RX ADMIN — HEPARIN SODIUM 5000 UNITS: 5000 INJECTION, SOLUTION INTRAVENOUS; SUBCUTANEOUS at 22:12

## 2017-11-11 RX ADMIN — DEXAMETHASONE 4 MG: 4 TABLET ORAL at 05:48

## 2017-11-11 RX ADMIN — OXYCODONE HYDROCHLORIDE 10 MG: 10 TABLET ORAL at 00:08

## 2017-11-11 RX ADMIN — GUAIFENESIN 600 MG: 600 TABLET, EXTENDED RELEASE ORAL at 09:06

## 2017-11-11 RX ADMIN — ACETAMINOPHEN 975 MG: 325 TABLET, FILM COATED ORAL at 14:45

## 2017-11-11 RX ADMIN — MORPHINE SULFATE 15 MG: 15 TABLET, EXTENDED RELEASE ORAL at 09:07

## 2017-11-11 RX ADMIN — PANTOPRAZOLE SODIUM 40 MG: 40 TABLET, DELAYED RELEASE ORAL at 05:48

## 2017-11-11 RX ADMIN — HEPARIN SODIUM 5000 UNITS: 5000 INJECTION, SOLUTION INTRAVENOUS; SUBCUTANEOUS at 14:45

## 2017-11-11 RX ADMIN — DEXAMETHASONE 4 MG: 4 TABLET ORAL at 11:05

## 2017-11-11 RX ADMIN — DEXAMETHASONE 4 MG: 4 TABLET ORAL at 17:24

## 2017-11-11 RX ADMIN — MORPHINE SULFATE 15 MG: 15 TABLET, EXTENDED RELEASE ORAL at 17:24

## 2017-11-11 RX ADMIN — DEXAMETHASONE 4 MG: 4 TABLET ORAL at 23:50

## 2017-11-11 RX ADMIN — BISACODYL 10 MG: 10 SUPPOSITORY RECTAL at 22:12

## 2017-11-11 RX ADMIN — PREGABALIN 50 MG: 50 CAPSULE ORAL at 17:24

## 2017-11-11 NOTE — PROGRESS NOTES
Internal Medicine Progress Note  Patient: Tenea Ocasio  Age/sex: 81 y o  female  Medical Record #: [id number]      ASSESSMENT/PLAN:  Teena Ocasio is seen and examined and mangement for following issues:    1  Cervical cord compression with myelopathy/Mass at [facility]: Likely related to metastatic disease  Currently undergoing XRT for 10 sessions to lumbar and cervical spine  Continue Decadron 4mg Q6 hours  Pain control  Therapy per primary team    2  Metastatic breast cancer: patient with moderate left pleural effusion, multiple hepatic lesions, possible adrenal mass and multiple bony lesions  Overall prognosis poor  Outpatient follow up with primary oncologist  Previously on Xeloda  3  Left pleural effusion: likely malignant  Monitor respiratory status  Currently denies complaints of SOB  4  Hypertension: continue norvasc and monitor blood pressure  Well controlled  5  Hypothyroidism: continue levothyroxine  6  Peripheral neuropathy: continue Lyrica; reduced to BID dosing per patient request  7  DVT prophylaxis: SQ heparin  8  Hyponatremia:  Mild at 133  Asymptomatic  Will monitor  9  Overactive bladder:  Detrol LA started  10  Chest pain: All VSS/ EKG reveals frequent PVCs otherwise WNL, Troponin negative x 3  Anxiolytic administered  ? Anxiety related, vs from bony mets? No further pain reported               Subjective: Patient seen and examined  Pt reports difficulty with moving bowels  Request dose of Miralax   Denies other complaints     ROS:   GI: denies abdominal pain, change bowel habits or reflux symptoms  Neuro: No new neurologic changes  Respiratory: No Cough, SOB  Cardiovascular: No CP, palpitations     Scheduled Meds:    acetaminophen 975 mg Oral Q8H [address]   amLODIPine 5 mg Oral Daily   dexamethasone 4 mg Oral Q6H EDWIN   guaiFENesin 600 mg Oral Q12H [address]   heparin (porcine) 5,000 Units Subcutaneous Q8H [address]   lactulose 20 g Oral BID   levothyroxine 100 mcg Oral Early Morning lidocaine 1 patch Transdermal Daily   morphine 15 mg Oral BID   pantoprazole 40 mg Oral Early Morning   pregabalin 50 mg Oral BID   senna-docusate sodium 1 tablet Oral HS   tolterodine 4 mg Oral Daily       Labs:       Results from last 7 days  Lab Units 11/09/17  0506 11/06/17 0614   WBC Thousand/uL 10 36* 6 64   HEMOGLOBIN g/dL 11 5 11 9   HEMATOCRIT % 34 0* 35 8   PLATELETS Thousands/uL 206 232       Results from last 7 days  Lab Units 11/09/17  0506 11/06/17  0614   SODIUM mmol/L 133* 134*   POTASSIUM mmol/L 4 9 4 8   CHLORIDE mmol/L 100 102   CO2 mmol/L 27 25   BUN mg/dL 27* 23   CREATININE mg/dL 0 43* 0 48*   GLUCOSE RANDOM mg/dL 92 102   CALCIUM mg/dL 7 8* 7 8*                  Glucose (mg/dL)   Date Value   11/09/2017 92   11/06/2017 102   10/31/2017 101   10/29/2017 120       Labs reviewed    Physical Examination:  Vitals:   Vitals:    11/10/17 0713 11/10/17 0844 11/11/17 0012 11/11/17 0724   BP: 110/71 116/70 120/81 112/74   Pulse: 80  80 76   Resp: 20  20 20   Temp: 97 9 °F (36 6 °C)  (!) 97 3 °F (36 3 °C) 97 6 °F (36 4 °C)   TempSrc: Oral  Oral Oral   SpO2: 100%  98% 98%   Weight:       Height:           GEN: NAD, resting in bed  HEENT: NC/AT, EOMI  RESP: CTAB, no R/R/W  CV: +S1 S2, regular rate, no rubs, no chest wall tenderness  ABD: soft, NT, ND, normal BS   : No Singh  EXT: No edema  Skin: No rashes  Neuro: AAOx3  Bilat pill rolling tremor  [ X ] Total time spent: 30 Mins and greater than 50% of this time was spent counseling/coordinating care        Linda Munoz PA-C  Internal Medicine

## 2017-11-11 NOTE — PROGRESS NOTES
11/11/17 1300   Pain Assessment   Pain Assessment 0-10   Pain Score 3   Restrictions/Precautions   Precautions Fall Risk   Weight Bearing Restrictions No   ROM Restrictions No   QI: Sit to Stand   Assistance Needed Physical assistance   Assistance Provided by [place] Less than 25%   Sit to Stand CARE Score 3   QI: Chair/Bed-to-Chair Transfer   Assistance Needed Physical assistance   Assistance Provided by [place] Less than 25%   Comment sit pivot transfer   Chair/Bed-to-Chair Transfer CARE Score 3   Transfer Bed/Chair/Wheelchair   Limitations Noted In Balance; Endurance;UE Strength;LE Strength   Sit Pivot Minimal Assist   Sit to Stand Minimal   Stand to Sit Minimal   Bed, Chair, Wheelchair Transfer (FIM) 4 - Patient completes 75% of all tasks   Functional Standing Tolerance   Time 5 min; 2 min   Activity static standing at table   Cognition   Overall Cognitive Status WFL   Arousal/Participation Alert   Attention Within functional limits   Orientation Level Oriented X4   Memory Decreased short term memory;Decreased recall of recent events;Decreased recall of precautions   Following Commands Follows one step commands with increased time or repetition   Activity Tolerance   Activity Tolerance Patient tolerated treatment well   Assessment   Treatment Assessment Pt participates in OT session with focus on standing tolerance, activity tolerance, and transfers to increase I with adls  Pt min A sit pivot transfer from bedside chair to w/c with cues for safe hand placement  Pt engaged in standing tolerance activity at the table with parquetry blocks and pt requires rest breaks to sit 2* decreased endurance  Pt noted for decreased ability to stand upright with BLE leaning toward left and pt requires constant min A support during stance  Pt will continue to benefit from standing tolerance, activity tolerance, and transfers     Prognosis Fair   Problem List Decreased strength;Decreased range of motion;Decreased endurance; Impaired balance;Decreased mobility   Plan   Treatment/Interventions Functional transfer training;LE strengthening/ROM; Endurance training   Progress Progressing toward goals   OT Therapy Minutes   OT Time In 1300   OT Time Out 1330   OT Total Time (minutes) 30   OT Mode of treatment - Individual (minutes) 30   OT Mode of treatment - Concurrent (minutes) 0   OT Mode of treatment - Group (minutes) 0   OT Mode of treatment - Co-treat (minutes) 0   OT Mode of Teatment - Total time(minutes) 30 minutes   Therapy Time missed   Time missed?  No

## 2017-11-12 PROCEDURE — 97110 THERAPEUTIC EXERCISES: CPT

## 2017-11-12 PROCEDURE — 97535 SELF CARE MNGMENT TRAINING: CPT

## 2017-11-12 PROCEDURE — 97112 NEUROMUSCULAR REEDUCATION: CPT

## 2017-11-12 PROCEDURE — 97530 THERAPEUTIC ACTIVITIES: CPT

## 2017-11-12 RX ADMIN — DEXAMETHASONE 4 MG: 4 TABLET ORAL at 13:25

## 2017-11-12 RX ADMIN — HEPARIN SODIUM 5000 UNITS: 5000 INJECTION, SOLUTION INTRAVENOUS; SUBCUTANEOUS at 13:30

## 2017-11-12 RX ADMIN — DEXAMETHASONE 4 MG: 4 TABLET ORAL at 18:08

## 2017-11-12 RX ADMIN — TOLTERODINE TARTRATE 4 MG: 4 CAPSULE, EXTENDED RELEASE ORAL at 09:25

## 2017-11-12 RX ADMIN — LACTULOSE 20 G: 20 SOLUTION ORAL at 09:29

## 2017-11-12 RX ADMIN — Medication 1 TABLET: at 22:15

## 2017-11-12 RX ADMIN — DEXAMETHASONE 4 MG: 4 TABLET ORAL at 05:11

## 2017-11-12 RX ADMIN — ALPRAZOLAM 0.25 MG: 0.25 TABLET ORAL at 13:31

## 2017-11-12 RX ADMIN — MORPHINE SULFATE 15 MG: 15 TABLET, EXTENDED RELEASE ORAL at 09:26

## 2017-11-12 RX ADMIN — DEXAMETHASONE 4 MG: 4 TABLET ORAL at 23:42

## 2017-11-12 RX ADMIN — ACETAMINOPHEN 975 MG: 325 TABLET, FILM COATED ORAL at 05:11

## 2017-11-12 RX ADMIN — GUAIFENESIN 600 MG: 600 TABLET, EXTENDED RELEASE ORAL at 22:14

## 2017-11-12 RX ADMIN — ACETAMINOPHEN 975 MG: 325 TABLET, FILM COATED ORAL at 22:14

## 2017-11-12 RX ADMIN — OXYCODONE HYDROCHLORIDE 10 MG: 10 TABLET ORAL at 06:22

## 2017-11-12 RX ADMIN — HEPARIN SODIUM 5000 UNITS: 5000 INJECTION, SOLUTION INTRAVENOUS; SUBCUTANEOUS at 05:10

## 2017-11-12 RX ADMIN — LEVOTHYROXINE SODIUM 100 MCG: 100 TABLET ORAL at 05:11

## 2017-11-12 RX ADMIN — PREGABALIN 50 MG: 50 CAPSULE ORAL at 09:25

## 2017-11-12 RX ADMIN — HEPARIN SODIUM 5000 UNITS: 5000 INJECTION, SOLUTION INTRAVENOUS; SUBCUTANEOUS at 22:14

## 2017-11-12 RX ADMIN — PREGABALIN 50 MG: 50 CAPSULE ORAL at 18:08

## 2017-11-12 RX ADMIN — ACETAMINOPHEN 975 MG: 325 TABLET, FILM COATED ORAL at 13:26

## 2017-11-12 RX ADMIN — GUAIFENESIN 600 MG: 600 TABLET, EXTENDED RELEASE ORAL at 09:25

## 2017-11-12 RX ADMIN — PANTOPRAZOLE SODIUM 40 MG: 40 TABLET, DELAYED RELEASE ORAL at 05:10

## 2017-11-12 RX ADMIN — LIDOCAINE 1 PATCH: 50 PATCH CUTANEOUS at 09:29

## 2017-11-12 RX ADMIN — MORPHINE SULFATE 15 MG: 15 TABLET, EXTENDED RELEASE ORAL at 18:08

## 2017-11-12 RX ADMIN — AMLODIPINE BESYLATE 5 MG: 5 TABLET ORAL at 09:29

## 2017-11-12 NOTE — PROGRESS NOTES
Internal Medicine Progress Note  Patient: Xander Paz  Age/sex: 81 y o  female  Medical Record #: [id number]      ASSESSMENT/PLAN:  Xander Paz is seen and examined and mangement for following issues:    1  Cervical cord compression with myelopathy/Mass at [facility]: Likely related to metastatic disease  Currently undergoing XRT for 10 sessions to lumbar and cervical spine  Continue Decadron 4mg Q6 hours  Pain control  Therapy per primary team    2  Metastatic breast cancer: patient with moderate left pleural effusion, multiple hepatic lesions, possible adrenal mass and multiple bony lesions  Overall prognosis poor  Outpatient follow up with primary oncologist  Previously on Xeloda  3  Left pleural effusion: likely malignant  Monitor respiratory status  Currently denies complaints of SOB  4  Hypertension: continue norvasc and monitor blood pressure  Well controlled  5  Hypothyroidism: continue levothyroxine  6  Peripheral neuropathy: continue Lyrica; reduced to BID dosing per patient request  7  DVT prophylaxis: SQ heparin  8  Hyponatremia:  Mild at 133  Asymptomatic  Will monitor  BMP in AM  9  Overactive bladder:  Detrol LA started  10  Chest pain: All VSS/ EKG reveals frequent PVCs otherwise WNL, Troponin negative x 3  Anxiolytic administered  ? Anxiety related, vs from bony mets? No further pain reported  11  LUE Edema: has component of lymphedema however per patient seems to be more edematous than her baseline; will check venous doppler  Continue compression garment                Subjective: Patient seen and examined  Pt reports difficulty with moving bowels  Request dose of Miralax   Denies other complaints     ROS:   GI: denies abdominal pain, change bowel habits or reflux symptoms  Neuro: No new neurologic changes  Respiratory: No Cough, SOB  Cardiovascular: No CP, palpitations     Scheduled Meds:    acetaminophen 975 mg Oral Q8H [address]   amLODIPine 5 mg Oral Daily   dexamethasone 4 mg Oral Q6H [address]   guaiFENesin 600 mg Oral Q12H [address]   heparin (porcine) 5,000 Units Subcutaneous Q8H [address]   lactulose 20 g Oral BID   levothyroxine 100 mcg Oral Early Morning   lidocaine 1 patch Transdermal Daily   morphine 15 mg Oral BID   pantoprazole 40 mg Oral Early Morning   pregabalin 50 mg Oral BID   senna-docusate sodium 1 tablet Oral HS   tolterodine 4 mg Oral Daily       Labs:       Results from last 7 days  Lab Units 11/09/17  0506 11/06/17  0614   WBC Thousand/uL 10 36* 6 64   HEMOGLOBIN g/dL 11 5 11 9   HEMATOCRIT % 34 0* 35 8   PLATELETS Thousands/uL 206 232       Results from last 7 days  Lab Units 11/09/17  0506 11/06/17  0614   SODIUM mmol/L 133* 134*   POTASSIUM mmol/L 4 9 4 8   CHLORIDE mmol/L 100 102   CO2 mmol/L 27 25   BUN mg/dL 27* 23   CREATININE mg/dL 0 43* 0 48*   GLUCOSE RANDOM mg/dL 92 102   CALCIUM mg/dL 7 8* 7 8*                  Glucose (mg/dL)   Date Value   11/09/2017 92   11/06/2017 102   10/31/2017 101   10/29/2017 120       Labs reviewed    Physical Examination:  Vitals:   Vitals:    11/11/17 1500 11/11/17 2251 11/12/17 0900 11/12/17 0928   BP: 102/66 109/73 114/78 114/78   Pulse: 72 82 67    Resp: 20 18 20    Temp: (!) 97 3 °F (36 3 °C) 97 5 °F (36 4 °C) 97 9 °F (36 6 °C)    TempSrc: Oral Oral Oral    SpO2: 96% 97% 98%    Weight:       Height:           GEN: NAD, resting in bed  HEENT: NC/AT, EOMI  RESP: CTAB, no R/R/W  CV: +S1 S2, regular rate, no rubs, no chest wall tenderness  ABD: soft, NT, ND, normal BS   : No Singh  EXT: No edema bilateral LE, + mild edema of LUE, non pitting  Skin: No rashes  Neuro: AAOx3  Bilat pill rolling tremor  [ X ] Total time spent: 30 Mins and greater than 50% of this time was spent counseling/coordinating care        Rickey Mosquera PA-C  Internal Medicine

## 2017-11-12 NOTE — PROGRESS NOTES
11/12/17 0730   Daily FIM Score   Discipline OT   Self-care   Grooming (FIM) 5 - [place] sets up supplies or applies device   Bathing (FIM) 3 - Patient completes 5/10  6/10 or 7/10 parts   UB Dressing (FIM) 5 - Patient requires supervision/monitoring   LB Dressing (FIM) 2 - Patient completes 25-49% of all tasks   Toileting (FIM) 1 - Patient completes less than 25% of all tasks   Mobility   Bed, Chair, Wheelchair Transfer (FIM) 4 - Patient completes 75% of all tasks   Communication   Comprehension (FIM) 6 - Understands complex/abstract but requires more time   Expression (FIM) 6 - Expresses complex/abstract but requires:  more time   Psychosocial    Social Interaction (FIM) 6 - Interacts appropriately with others BUT requires extra  time   Cognition   Problem solving (FIM) 5 - Solves basic problems 90% of time   Memory (FIM) 5 - Recalls/performs request 90% of time

## 2017-11-12 NOTE — PROGRESS NOTES
11/12/17 0730   Pain Assessment   Pain Assessment No/denies pain   Pain Score No Pain   Restrictions/Precautions   Precautions Fall Risk   QI: Oral Hygiene   Assistance Needed Set-up / [address] Provided by [place] No physical assistance   Oral Hygiene CARE Score 5   Grooming   Findings (seated)   Grooming (FIM) 5 - [place] sets up supplies or applies device   QI: Shower/Bathe Self   Assistance Needed Physical assistance   Assistance Provided by [place] 25%-49%   Shower/Bathe Self CARE Score 3   [address]   Anticipated D/C Bath Style Sponge Bath   Able to [place] No   Able to [employer] Temperature No   Able to Wash/Rinse/Dry (body part) Left Arm;Right Arm;L Upper Leg;R Upper Leg;Chest;Abdomen;Perineal Area   Limitations Noted in Endurance;Balance   Positioning Supine   Bathing (FIM) 3 - Patient completes 5/10  6/10 or 7/10 parts   Tub/Shower Transfer   Not Assessed Sponge Bath;Balance   QI: Upper Body Dressing   Assistance Needed Supervision   Assistance Provided by [place] No physical assistance   Upper Body Dressing CARE Score 4   QI: Lower Body Dressing   Assistance Needed Physical assistance   Assistance Provided by [place] 75% or more   Lower Body Dressing CARE Score 2   QI: Putting On/Taking Off Footwear   Reason if not Attempted Activity not applicable   Putting On/Taking Off Footwear CARE Score 9   QI: Picking Up Object   Reason if not Attempted Safety concerns   Picking Up Object CARE Score 88   Dressing/Undressing Clothing   UB Dressing (FIM) 5 - Patient requires supervision/monitoring   LB Dressing (FIM) 2 - Patient completes 25-49% of all tasks   QI: Roll Left and Right   Assistance Needed Supervision   Roll Left and Right CARE Score 4   QI: Sit to Lying   Assistance Needed Physical assistance   Assistance Provided by [place] Less than 25%   Comment (management of RLE)   Sit to Lying CARE Score 3   QI: Lying to Sitting on Side of Bed   Assistance Needed Physical assistance   Assistance Provided by [place] Less than 25%   Comment (management of RLE)   Lying to Sitting on Side of Bed CARE Score 3   QI: Sit to [address] Provided by [place] Less than 25%   Sit to Stand CARE Score 3   QI: Chair/Bed-to-Chair Transfer   Assistance Needed Adaptive equipment   Assistance Provided by [place] Less than 25%   Comment (spt)   Chair/Bed-to-Chair Transfer CARE Score 3   Transfer Bed/Chair/Wheelchair   Bed, Chair, Wheelchair Transfer (FIM) 4 - Patient completes 75% of all tasks   QI: [address]   Reason if not Attempted Activity not applicable   Toileting Hygiene CARE Score 9   Toileting   Findings (pt  had episode of incontinence prior to start of session)   Toileting (FIM) 1 - Patient completes less than 25% of all tasks   QI: Toilet Transfer   Reason if not Attempted Activity not applicable   Toilet Transfer CARE Score 9   Functional Standing Tolerance   Time (up to ~2 minutes at a time)   Assessment   Treatment Assessment Pt  participated in skilled OT intervention  Pt  tolerated therapy session well  Pt  performed ADLs and functional transfers as noted above  Pt  educated on importance of weight shifting for skin protection  Pt  engaged in manipulating theraputty (green) with BUE  Pt  returned to room at end of session  During session, RN present to assess skin integrity on pt 's buttocks  Lotion applied per RN's request  Pt  continuously reminded during session about weight shifting  OT Therapy Minutes   OT Time In 0730   OT Time Out 0900   OT Total Time (minutes) 90   OT Mode of treatment - Individual (minutes) 90   OT Mode of treatment - Concurrent (minutes) 0   OT Mode of treatment - Group (minutes) 0   OT Mode of treatment - Co-treat (minutes) 0   OT Mode of Teatment - Total time(minutes) 90 minutes   Therapy Time missed   Time missed?  No

## 2017-11-13 ENCOUNTER — APPOINTMENT (INPATIENT)
Dept: NON INVASIVE DIAGNOSTICS | Facility: HOSPITAL | Age: 72
DRG: 947 | End: 2017-11-13
Payer: MEDICARE

## 2017-11-13 LAB
ANION GAP SERPL CALCULATED.3IONS-SCNC: 7 MMOL/L (ref 4–13)
ATRIAL RATE: 98 BPM
BASOPHILS # BLD MANUAL: 0 THOUSAND/UL (ref 0–0.1)
BASOPHILS NFR MAR MANUAL: 0 % (ref 0–1)
BUN SERPL-MCNC: 26 MG/DL (ref 5–25)
CALCIUM SERPL-MCNC: 7.4 MG/DL (ref 8.3–10.1)
CHLORIDE SERPL-SCNC: 101 MMOL/L (ref 100–108)
CO2 SERPL-SCNC: 25 MMOL/L (ref 21–32)
CREAT SERPL-MCNC: 0.37 MG/DL (ref 0.6–1.3)
EOSINOPHIL # BLD MANUAL: 0 THOUSAND/UL (ref 0–0.4)
EOSINOPHIL NFR BLD MANUAL: 0 % (ref 0–6)
ERYTHROCYTE [DISTWIDTH] IN BLOOD BY AUTOMATED COUNT: 15.1 % (ref 11.6–15.1)
GFR SERPL CREATININE-BSD FRML MDRD: 125 ML/MIN/1.73SQ M
GLUCOSE P FAST SERPL-MCNC: 100 MG/DL (ref 65–99)
GLUCOSE SERPL-MCNC: 100 MG/DL (ref 65–140)
HCT VFR BLD AUTO: 34.6 % (ref 34.8–46.1)
HGB BLD-MCNC: 11.7 G/DL (ref 11.5–15.4)
LYMPHOCYTES # BLD AUTO: 0.11 THOUSAND/UL (ref 0.6–4.47)
LYMPHOCYTES # BLD AUTO: 1 % (ref 14–44)
MCH RBC QN AUTO: 28.3 PG (ref 26.8–34.3)
MCHC RBC AUTO-ENTMCNC: 33.8 G/DL (ref 31.4–37.4)
MCV RBC AUTO: 84 FL (ref 82–98)
MONOCYTES # BLD AUTO: 0.42 THOUSAND/UL (ref 0–1.22)
MONOCYTES NFR BLD: 4 % (ref 4–12)
NEUTROPHILS # BLD MANUAL: 10.06 THOUSAND/UL (ref 1.85–7.62)
NEUTS BAND NFR BLD MANUAL: 1 % (ref 0–8)
NEUTS SEG NFR BLD AUTO: 94 % (ref 43–75)
NRBC BLD AUTO-RTO: 0 /100 WBCS
OVALOCYTES BLD QL SMEAR: PRESENT
P AXIS: 49 DEGREES
PLATELET # BLD AUTO: 191 THOUSANDS/UL (ref 149–390)
PLATELET BLD QL SMEAR: ADEQUATE
PMV BLD AUTO: 9.3 FL (ref 8.9–12.7)
POTASSIUM SERPL-SCNC: 4.7 MMOL/L (ref 3.5–5.3)
PR INTERVAL: 164 MS
QRS AXIS: 75 DEGREES
QRSD INTERVAL: 84 MS
QT INTERVAL: 352 MS
QTC INTERVAL: 449 MS
RBC # BLD AUTO: 4.14 MILLION/UL (ref 3.81–5.12)
RBC MORPH BLD: PRESENT
SODIUM SERPL-SCNC: 133 MMOL/L (ref 136–145)
T WAVE AXIS: 50 DEGREES
VENTRICULAR RATE: 98 BPM
WBC # BLD AUTO: 10.59 THOUSAND/UL (ref 4.31–10.16)

## 2017-11-13 PROCEDURE — 97535 SELF CARE MNGMENT TRAINING: CPT

## 2017-11-13 PROCEDURE — 77387 GUIDANCE FOR RADJ TX DLVR: CPT | Performed by: RADIOLOGY

## 2017-11-13 PROCEDURE — 97110 THERAPEUTIC EXERCISES: CPT

## 2017-11-13 PROCEDURE — 93971 EXTREMITY STUDY: CPT

## 2017-11-13 PROCEDURE — 97530 THERAPEUTIC ACTIVITIES: CPT

## 2017-11-13 PROCEDURE — 85027 COMPLETE CBC AUTOMATED: CPT | Performed by: PHYSICIAN ASSISTANT

## 2017-11-13 PROCEDURE — 97112 NEUROMUSCULAR REEDUCATION: CPT

## 2017-11-13 PROCEDURE — 80048 BASIC METABOLIC PNL TOTAL CA: CPT | Performed by: PHYSICIAN ASSISTANT

## 2017-11-13 PROCEDURE — 97116 GAIT TRAINING THERAPY: CPT

## 2017-11-13 PROCEDURE — 85007 BL SMEAR W/DIFF WBC COUNT: CPT | Performed by: PHYSICIAN ASSISTANT

## 2017-11-13 PROCEDURE — 77412 RADIATION TX DELIVERY LVL 3: CPT | Performed by: RADIOLOGY

## 2017-11-13 PROCEDURE — 77336 RADIATION PHYSICS CONSULT: CPT | Performed by: RADIOLOGY

## 2017-11-13 RX ADMIN — AMLODIPINE BESYLATE 5 MG: 5 TABLET ORAL at 07:55

## 2017-11-13 RX ADMIN — DEXAMETHASONE 4 MG: 4 TABLET ORAL at 05:22

## 2017-11-13 RX ADMIN — HEPARIN SODIUM 5000 UNITS: 5000 INJECTION, SOLUTION INTRAVENOUS; SUBCUTANEOUS at 12:49

## 2017-11-13 RX ADMIN — MORPHINE SULFATE 15 MG: 15 TABLET, EXTENDED RELEASE ORAL at 07:54

## 2017-11-13 RX ADMIN — ACETAMINOPHEN 975 MG: 325 TABLET, FILM COATED ORAL at 21:41

## 2017-11-13 RX ADMIN — DEXAMETHASONE 4 MG: 4 TABLET ORAL at 23:57

## 2017-11-13 RX ADMIN — TOLTERODINE TARTRATE 4 MG: 4 CAPSULE, EXTENDED RELEASE ORAL at 07:57

## 2017-11-13 RX ADMIN — HEPARIN SODIUM 5000 UNITS: 5000 INJECTION, SOLUTION INTRAVENOUS; SUBCUTANEOUS at 05:22

## 2017-11-13 RX ADMIN — LEVOTHYROXINE SODIUM 100 MCG: 100 TABLET ORAL at 05:22

## 2017-11-13 RX ADMIN — HEPARIN SODIUM 5000 UNITS: 5000 INJECTION, SOLUTION INTRAVENOUS; SUBCUTANEOUS at 21:41

## 2017-11-13 RX ADMIN — ALPRAZOLAM 0.25 MG: 0.25 TABLET ORAL at 09:54

## 2017-11-13 RX ADMIN — LIDOCAINE 1 PATCH: 50 PATCH CUTANEOUS at 07:55

## 2017-11-13 RX ADMIN — DEXAMETHASONE 4 MG: 4 TABLET ORAL at 12:48

## 2017-11-13 RX ADMIN — LACTULOSE 20 G: 20 SOLUTION ORAL at 07:55

## 2017-11-13 RX ADMIN — LACTULOSE 20 G: 20 SOLUTION ORAL at 18:15

## 2017-11-13 RX ADMIN — OXYCODONE HYDROCHLORIDE 10 MG: 10 TABLET ORAL at 07:55

## 2017-11-13 RX ADMIN — ACETAMINOPHEN 975 MG: 325 TABLET, FILM COATED ORAL at 12:48

## 2017-11-13 RX ADMIN — PANTOPRAZOLE SODIUM 40 MG: 40 TABLET, DELAYED RELEASE ORAL at 05:22

## 2017-11-13 RX ADMIN — GUAIFENESIN 600 MG: 600 TABLET, EXTENDED RELEASE ORAL at 21:41

## 2017-11-13 RX ADMIN — MORPHINE SULFATE 15 MG: 15 TABLET, EXTENDED RELEASE ORAL at 18:15

## 2017-11-13 RX ADMIN — OXYCODONE HYDROCHLORIDE 10 MG: 10 TABLET ORAL at 21:41

## 2017-11-13 RX ADMIN — DEXAMETHASONE 4 MG: 4 TABLET ORAL at 18:15

## 2017-11-13 RX ADMIN — PREGABALIN 50 MG: 50 CAPSULE ORAL at 07:55

## 2017-11-13 RX ADMIN — ACETAMINOPHEN 975 MG: 325 TABLET, FILM COATED ORAL at 05:22

## 2017-11-13 RX ADMIN — GUAIFENESIN 600 MG: 600 TABLET, EXTENDED RELEASE ORAL at 07:55

## 2017-11-13 RX ADMIN — PREGABALIN 50 MG: 50 CAPSULE ORAL at 18:15

## 2017-11-13 RX ADMIN — Medication 1 TABLET: at 21:41

## 2017-11-13 NOTE — PROGRESS NOTES
Internal Medicine Progress Note  Patient: Gal Bunch  Age/sex: 81 y o  female  Medical Record #: [id number]      ASSESSMENT/PLAN:  Gal Bunch is seen and examined and mangement for following issues:    1  Cervical cord compression with myelopathy/Mass at [facility]: Likely related to metastatic disease  Currently undergoing XRT for 10 sessions to lumbar and cervical spine  Continue Decadron 4mg Q6 hours  Pain control  Therapy per primary team    2  Metastatic breast cancer: patient with moderate left pleural effusion, multiple hepatic lesions, possible adrenal mass and multiple bony lesions  Overall prognosis poor  Outpatient follow up with primary oncologist  Previously on Xeloda  3  Left pleural effusion: likely malignant  Monitor respiratory status  Currently denies complaints of SOB  4  Hypertension: continue norvasc and monitor blood pressure  Well controlled  5  Hypothyroidism: continue levothyroxine  6  Peripheral neuropathy: continue Lyrica; reduced to BID dosing per patient request  7  DVT prophylaxis: SQ heparin  8  Hyponatremia:  Mild at 133  Asymptomatic  Will monitor  BMP pending  9  Overactive bladder:  Detrol LA started  10  Chest pain: All VSS/ EKG reveals frequent PVCs otherwise WNL, Troponin negative x 3  Anxiolytic administered  ? Anxiety related, vs from bony mets? No further pain reported  11  LUE Edema: has component of lymphedema however per patient seems to be more edematous than her baseline; will check venous doppler  Continue compression garment                Subjective: Patient seen and examined  Pt reports that she is fatigued today  She denies any other complaints       ROS:   GI: denies abdominal pain, change bowel habits or reflux symptoms  Neuro: No new neurologic changes  Respiratory: No Cough, SOB  Cardiovascular: No CP, palpitations     Scheduled Meds:    acetaminophen 975 mg Oral Q8H [address]   amLODIPine 5 mg Oral Daily   dexamethasone 4 mg Oral Q6H [address] guaiFENesin 600 mg Oral Q12H [address]   heparin (porcine) 5,000 Units Subcutaneous Q8H [address]   lactulose 20 g Oral BID   levothyroxine 100 mcg Oral Early Morning   lidocaine 1 patch Transdermal Daily   morphine 15 mg Oral BID   pantoprazole 40 mg Oral Early Morning   pregabalin 50 mg Oral BID   senna-docusate sodium 1 tablet Oral HS   tolterodine 4 mg Oral Daily       Labs:       Results from last 7 days  Lab Units 11/13/17  0616 11/09/17  0506   WBC Thousand/uL 10 59* 10 36*   HEMOGLOBIN g/dL 11 7 11 5   HEMATOCRIT % 34 6* 34 0*   PLATELETS Thousands/uL 191 206       Results from last 7 days  Lab Units 11/09/17  0506   SODIUM mmol/L 133*   POTASSIUM mmol/L 4 9   CHLORIDE mmol/L 100   CO2 mmol/L 27   BUN mg/dL 27*   CREATININE mg/dL 0 43*   GLUCOSE RANDOM mg/dL 92   CALCIUM mg/dL 7 8*                  Glucose (mg/dL)   Date Value   11/09/2017 92   11/06/2017 102   10/31/2017 101   10/29/2017 120       Labs reviewed    Physical Examination:  Vitals:   Vitals:    11/12/17 0928 11/12/17 2336 11/12/17 2349 11/13/17 0800   BP: 114/78 113/75  126/86   Pulse:  99 84 86   Resp:  20  20   Temp:  98 2 °F (36 8 °C)  97 6 °F (36 4 °C)   TempSrc:  Oral  Axillary   SpO2:  96%  100%   Weight:       Height:           GEN: NAD, resting in bed  HEENT: NC/AT, EOMI  RESP: CTAB, no R/R/W  CV: +S1 S2, regular rate, no rubs, no chest wall tenderness  ABD: soft, NT, ND, normal BS   : No Singh  EXT: No edema bilateral LE, + mild edema of LUE, non pitting  Skin: No rashes  Neuro: AAOx3  Bilat pill rolling tremor  [ X ] Total time spent: 30 Mins and greater than 50% of this time was spent counseling/coordinating care        Jose De Jesus Crespo PA-C  Internal Medicine

## 2017-11-13 NOTE — PROGRESS NOTES
11/13/17 0830   Pain Assessment   Pain Assessment No/denies pain   Pain Score No Pain   QI: Oral Hygiene   Assistance Needed Set-up / clean-up;Supervision   Comment seated at sink, requires Assist for opening toothpaste   Oral Hygiene CARE Score 4   Grooming   Able To Initiate Tasks; Wash/Dry Hands; Wash/Dry Face;Brush/Clean Teeth   Findings seated posittion   Grooming (FIM) 5 - [place] sets up supplies or applies device   QI: Shower/Bathe Self   Assistance Needed Physical assistance   Assistance Provided by [place] 25%-49%   Shower/Bathe Self CARE Score 3   Bathing   Assessed Bath Style Sponge Bath   Anticipated D/C Bath Style Sponge Bath   Able to [employer] Temperature No   Able to Wash/Rinse/Dry (body part) Left Arm;Right Arm;L Upper Leg;R Upper Leg;Chest;Abdomen;Perineal Area; Buttocks   Positioning Seated  (EOB)   Findings  continues to require assist for LB bathing 2* poor sitting balance at EOB, and LE strength  leaning side to side for perineal bathing at EOB  Bathing (FIM) 4 - Patient completes 8/10 or 9/10 parts   QI: Upper Body Dressing   Assistance Needed Set-up / clean-up;Supervision   Upper Body Dressing CARE Score 4   QI: Lower Body Dressing   Assistance Needed Physical assistance   Assistance Provided by [place] 75% or more   Lower Body Dressing CARE Score 2   QI: Putting On/Taking Off Footwear   Assistance Needed Physical assistance   Assistance Provided by [place] Total assistance   Putting On/Taking Off Footwear CARE Score 1   QI: Picking Up Object   Reason if not Attempted Safety concerns   Picking Up Object CARE Score 88   Dressing/Undressing Clothing   Remove UB Clothes (gown)   Remove LB Clothes [address]; Undergarment;Socks   Limitations Noted In Balance; Endurance;Problem Solving; Sequencing;Strength;ROM   Adaptive Equipment Reacher   Positioning Sit Edge Of Bed   Findings Pt EDU for use fo LH reacher for donning pants over feet   Pt becomes frustrated and demo limited frustration tolerance for use lf LH reacher  Pt reporting at baseline she does not wear pants, but Pt edu on use for underwear practive that needs to be donned over feet  Requires MOD A in stance for pants over hips and does fall back onto bed w/ L side retropulsive fall back onto bed, unable to correct or control landing, MAX A required  UB Dressing (FIM) 5 - [place] sets up supplies or applies device   LB Dressing (FIM) 2 - Patient completes 25-49% of all tasks   QI: Roll Left and Right   Assistance Needed Supervision   Roll Left and Right CARE Score 4   QI: Sit to Lying   Assistance Needed Supervision   Sit to Lying CARE Score 4   QI: Lying to Sitting on Side of Bed   Assistance Needed Supervision   Lying to Sitting on Side of Bed CARE Score 4   QI: Sit to Stand   Assistance Needed Physical assistance   Assistance Provided by [place] Less than 25%   Sit to Stand CARE Score 3   QI: Chair/Bed-to-Chair Transfer   Assistance Needed Incidental touching   Comment sit piv to WC from bed   Chair/Bed-to-Chair Transfer CARE Score 4   Transfer Bed/Chair/Wheelchair   Positioning Concerns Skin Integrity   Limitations Noted In Balance; Endurance;Confidence; Coordination;UE Strength;LE Strength   Adaptive Equipment None   Sit Pivot Minimal Assist   Sit to Stand Minimal   Stand to Sit Minimal   Bed, Chair, Wheelchair Transfer (FIM) 3 - [place] needs to lift, boost or assist to stand OR sit   QI: [address] Needed Physical assistance   Assistance Provided by [place] 75% or more   Toileting Hygiene CARE Score 2   Toileting   Able to [address] down no, up no  Toileting (FIM) 1 - Patient completes less than 25% of all tasks   Coordination   Fine Motor Use of [place] thera-putty  Pt provided w/ HEP for at home use  Pt using R +L UE for strengthening in distal digits  pt using varoius tools including utensils for simulated self feeding set up   Pt requires use of built up fork on L and knife use  Ther-ex focus on wrist control w/ finger use for fine coordination  Pt reports fatigue in L UE after 4 sets of 20 reps  Cognition   Overall Cognitive Status Impaired   Arousal/Participation Alert   Attention Attends with cues to redirect   Orientation Level Oriented X4   Memory Decreased short term memory;Decreased recall of recent events   Following Commands Follows one step commands with increased time or repetition   Other Comments   Assessment Ot session focus on Activity tolerance, standing balance and sitting balance through ADL participation  Continue to recommend Bed level ADL set up for increased independence and increased time to complete tasks  pt particiaptes in [facility] activity w/ use of theraputty in yellow resistance  See above for details  Pt son contacted and family training set up for tomorrow 11/14 9:30 am for PT/OT  Assessment   Prognosis Fair   Problem List Decreased strength;Decreased range of motion;Decreased endurance; Impaired balance;Decreased mobility; Impaired sensation   Plan   Treatment/Interventions Functional transfer training;ADL retraining; Therapeutic exercise   Progress Slow progress, decreased activity tolerance   Recommendation   OT Discharge Recommendation Home OT   OT Therapy Minutes   OT Time In 0830   OT Time Out 1000   OT Total Time (minutes) 90   OT Mode of treatment - Individual (minutes) 90   OT Mode of treatment - Concurrent (minutes) 0   OT Mode of treatment - Group (minutes) 0   OT Mode of treatment - Co-treat (minutes) 0   OT Mode of Teatment - Total time(minutes) 90 minutes   Therapy Time missed   Time missed?  No

## 2017-11-13 NOTE — PROGRESS NOTES
Physical Medicine and Rehabilitation Progress Note:  Spinal Cord Dysfunction:  Non-Traumatic:  04 130 Other Non-Traumatic Cord Compression  Ale Womack 81 y o  female MRN: [id number]  Unit/Bed#: -01 Encounter: [id number]    Assessment:   Adams Vyas is a 81 y o  female who  has a past medical history of Arthritis; Asthma; Breast cancer ([address] ); Disease of thyroid gland; and Hypertension  and [facility] [address] [address] after outpatient MRI demonstrated cord compression  Palliative treatment to spine started  Surgical intervention not recommended due to extent of metastasis  She was accepted to [facility] on 11/03/17  Subjective: No acute events overnight  Israel Jenkins was seen while in therapies  Continues to endorse left lateral hip pain down a little past her knee, intermittent  Continued to encourage stretching  No other complaints otherwise  ROS: A 10-point ROS was performed  Negative except as listed above  Restrictions include: Fall precautions    Disposition: Reteam    Plan:  # Cord compression, myelopathy  - Acute comprehensive interdisciplinary inpatient rehabilitation including PT, OT, SLP, RN, CM, SW, dietary, psychology, etc   - Appreciate Internal Medicine following - Dr Tyson Jade service      - secondary to spinal mets  - undergoing XRT (total of 10 sessions)  - Continue steroids    # Left lateral leg pain  - likely due to tight IT-band  - will instruct therapy to perform stretching    # Hyponatremia  - continue to monitor    Results from last 7 days  Lab Units 11/13/17  0616 11/09/17  0506   SODIUM mmol/L 133* 133*     # Mild Leukocytosis  - continue to monitor  - no fever or chills noted  - patient is having cough, states mucous is yellowish-green when she blows her nose  Started patient on mucinex to help thin out mucous        Results from last 7 days  Lab Units 11/13/17  0616 11/09/17  0506   WBC Thousand/uL 10 59* 10 36*     # Chest pain (resolved)  - EKG performed, per IM team, demonstrates multiple PVCs  - Troponins negative x 3  - Continue to monitor for now      # Metastatic breast CA  - f/u with oncology as outpatient  - noted to have edema to LUE, will require compression garment     # Neuropathy  - continue lyrica     # Anemia (improved)  - Likely reactive  - continue monitoring CBC       Results from last 7 days  Lab Units 11/13/17  0616 11/09/17  0506   HEMOGLOBIN g/dL 11 7 11 5     # HTN  - Continue Amlodipine     Temp:  [97 6 °F (36 4 °C)-98 2 °F (36 8 °C)] 97 6 °F (36 4 °C)  HR:  [84-99] 86  Resp:  [20] 20  BP: (113-126)/(75-86) 126/86    # Hypothyroid  - Continue levothyroxine      # Pain  - Continue tylenol PRN, for max of 3gm daily     - Continue oxycodone   - Continue MS Contin  - This patient record was searched in the [place] PDMP and the risks/benefits of prescribing this patient medications with high abuse/dependence potential was assessed and determined to be medically appropriate at this time  I could not find any prescriptions in [place], [place]  Patient reports she was getting oxycodone 10mg TID (chart review notes she was taking 10mg Q6h PRN), and MS contin 15mg BID      # Rehab Psych  - Continue alprazolam PRN  - Neuropsych consult, appreciate recs     # FENA/prophy  - Diet: cardiac   SLP and nutrition to monitor and adjust as necessary   - DVT prophy: Sequential compression device (Venodyne)  and Heparin  - GI ppx: Pantaprazole  - Bowel: colace , dulcolax suppository  and miralax PRN  - Nausea: Zofran  - Supplements: None  - Sleep: None      CODE: Level 1: Full Code    Objective:  Nursing staff reporting: no problems    Functional Update:  Physical Therapy Occupational Therapy   Weight Bearing Status: Full Weight Bearing  Transfers: Minimal Assistance  Bed Mobility: Supervision  Amulation Distance (ft): 10 feet  Ambulation: Moderate Assistance, Assist of 2  Assistive Device for Ambulation:  (parallel bars)  Wheelchair Mobility Distance: 150 ft  Wheelchair Mobility: Minimal Assistance, Supervision  Discharge Recommendations: Home with:  [address] with[de-identified] 24 Hour Supervision, 24 Hour Assisteance, Family Support, First Floor Setup, Home Physical Therapy   Eating: Supervision  Grooming: Supervision  Bathing: Moderate Assistance  Bathing: Moderate Assistance  Upper Body Dressing: Supervision  Lower Body Dressing: Total Assistance  Toileting: Maximum Assistance  Tub/Shower Transfer: Total Assistance  Toilet Transfer: Total Assistance  Cognition: Within Defined Limits  Orientation: Person, Place, Time, Situation       Allergies and Medications per EMR    Physical Exam:  General: alert, no apparent distress, cooperative and comfortable  HENMT: Head: Normal, normocephalic, atraumatic  Eye: Normal external eye, conjunctiva, lids   Ears: Normal external ears    Nose: Normal external nose, mucus membranes  Pulmonary: chest expansion normal, no retractions, no accessory muscle usage  Abdomen: soft, nontender, nondistended, no masses or organomegaly  Skin/Extremity: no rashes, no erythema, LUE edema (which patient states is her baseline)  Neurologic: Awake alert orientedx3  Psych: normal mood, behavior, speech, dress, and thought processes    Diagnostic Studies:  Reviewed, no new imaging    Vitals: Reviewed   Temp:  [97 6 °F (36 4 °C)-98 2 °F (36 8 °C)] 97 6 °F (36 4 °C)  HR:  [84-99] 86  Resp:  [20] 20  BP: (113-126)/(75-86) 126/86   Intake/Output Summary (Last 24 hours) at 11/13/17 0952  Last data filed at 11/13/17 0511   Gross per 24 hour   Intake              220 ml   Output             1375 ml   Net            -1155 ml        Laboratory: Reviewed  Lab Results   Component Value Date    HGB 11 7 11/13/2017    HCT 34 6 (L) 11/13/2017    WBC 10 59 (H) 11/13/2017     Lab Results   Component Value Date    BUN 26 (H) 11/13/2017     (L) 11/13/2017    K 4 7 11/13/2017     11/13/2017    GLUCOSE 100 11/13/2017    CREATININE 0 37 (L) 11/13/2017     No results found for: PROTIME, INR     Patient Active Problem List   Diagnosis    C[facility]ical [facility] compression with [facility]opathy [facility] )    Breast cancer ([facility] )    Essential hypertension    Hypothyroidism       ** Please Note: Fluency Direct voice to text software may have been used in the creation of this document  **    [ x ] Total time spent: 30 Mins, and greater than 50% of this time was spent counseling/coordinating care

## 2017-11-13 NOTE — PROGRESS NOTES
Physical Therapy Progress Note   11/12/17 8000   Pain Assessment   Pain Assessment No/denies pain   Pain Score No Pain   Restrictions/Precautions   Precautions Fall Risk   Weight Bearing Restrictions No   ROM Restrictions No   Cognition   Overall Cognitive Status WFL   Arousal/Participation Alert   Attention Within functional limits   Orientation Level Oriented X4   Memory Decreased short term memory;Decreased recall of recent events;Decreased recall of precautions   Following Commands Follows one step commands with increased time or repetition   Subjective   Subjective denies pain; no c/o   QI: Roll Left and Right   Assistance Needed Supervision   Assistance Provided by [place] No physical assistance   Roll Left and Right CARE Score 4   QI: Sit to Lying   Assistance Needed Physical assistance   Assistance Provided by [place] Less than 25%   Sit to Lying CARE Score 3   QI: Lying to Sitting on Side of Bed   Assistance Needed Physical assistance   Assistance Provided by [place] Less than 25%   Lying to Sitting on Side of Bed CARE Score 3   QI: Sit to Stand   Assistance Needed Supervision; Incidental touching   Assistance Provided by [place] No physical assistance   Comment CS/cgA   Sit to Stand CARE Score 4   Bed Mobility   Able to Roll Left to Right;Right to Left;Scoot Up   Findings Andreas   QI: Chair/Bed-to-Chair Transfer   Assistance Needed Supervision; Incidental touching   Assistance Provided by [place] No physical assistance   Comment CS/cgA   Chair/Bed-to-Chair Transfer CARE Score 4   Transfer Bed/Chair/Wheelchair   Limitations Noted In Balance; Coordination   Adaptive Equipment Roller Walker   Sit Pivot Supervision;Contact Guard  (CS/cgA)   Stand Pivot Minimal Assist   Sit to Stand Minimal Assist   Stand to Sit Minimal Assist   Supine to Sit Minimal Assist   Sit to Supine Minimal Assist   Findings S-Andreas   Bed, Chair, Wheelchair Transfer (FIM) 4 - Patient requires steadying assist or light touching   QI: Car Transfer Reason if not Attempted Activity not applicable   Car Transfer CARE Score 9   QI: Walk 10 Feet   Assistance Needed Physical assistance   Assistance Provided by [place] 50%-74%   Walk 10 Feet CARE Score 2   QI: Walk 50 Feet with Two Turns   Reason if not Attempted Activity not applicable   Walk 50 Feet with Two Turns CARE Score 9   QI: Walk 150 Feet   Reason if not Attempted Activity not applicable   Walk 784 Feet CARE Score 9   QI: Walking 10 Feet on Uneven Surfaces   Reason if not Attempted Activity not applicable   Walking 10 Feet on Uneven Surfaces CARE Score 9   Ambulation   Does the patient walk? 2  Yes   Primary Discharge Mode of Locomotion Wheelchair   Findings NT   Walking (FIM) 0 - Activity does not occur   QI: Wheel 50 Feet with Two [address] Provided by [place] Less than 25%   Comment L LE and R UE with Andreas   Wheel 50 Feet with Two Turns CARE Score 3   QI: Wheel 150 Feet   Assistance Needed Verbal cues   Assistance Provided by [place] Less than 25%   Comment Andreas   Wheel 150 Feet CARE Score 3   Wheelchair mobility   QI: Does the patient use a wheelchair? 1  Yes   QI: Indicate the type of wheelchair 1  Manual   Wheelchair Assist Level Minimum Assist   Method Right upper extremity; Left lower extremity   Needs Assist With Remove Leg Rest;Replace Leg Rest;Obstacles   Distance Level Surface (feet) 150 ft   Findings VC's and occ corrections   Wheelchair (FIM) 4 - Patient requires incidental assist around corners or doorways  AND wheels distance 150 feet or more, no rest   QI: 1 Step (Curb)   Reason if not Attempted Activity not applicable   1 Step (Curb) CARE Score 9   QI: 4 Steps   Reason if not Attempted Activity not applicable   4 Steps CARE Score 9   QI: 12 Steps   Reason if not Attempted Activity not applicable   12 Steps CARE Score 9   Stairs   Findings NT   QI: Picking Up Object   Reason if not Attempted Safety concerns   Picking Up Object CARE Score 88   QI: Toilet Transfer   Assistance Needed Physical assistance   Assistance Provided by [place] Less than 25%   Toilet Transfer CARE Score 3   Toilet Transfer   Toilet Transfer (FIM) 4 - Patient completes 75% of all tasks   Therapeutic Interventions   Strengthening seated TE (marches, LAQ, hip abd/add, ankle PF/DF) reps until fatigued   Flexibility B HS and gastroc stretch, manual    Equipment Use   NuStep x15 minutes, level 2   Assessment   Treatment Assessment Pt supine in bed prior to session; [place] for supine <> sit EOB, sit pivot to w/c with CS/cgA; able to propel 150' CS with L LE and R UE; instructed in seated TE and NuStep x15 minutes, level 2; finished session seated in bedside recliner with all needs in reach; recommend cont PTPOC; Family/Caregiver Present no   Problem List Decreased strength;Decreased range of motion;Decreased endurance; Impaired balance;Decreased mobility   Barriers to Discharge Inaccessible home environment;Decreased caregiver support   PT Barriers   Physical Impairment Decreased strength;Decreased endurance; Impaired balance;Decreased mobility; Decreased coordination;Pain;Decreased skin integrity   Functional Limitation Car transfers; Ramp negotiation;Stair negotiation;Standing;Transfers; Walking; Wheelchair management   Plan   Treatment/Interventions ADL retraining;Functional transfer training;LE strengthening/ROM; Elevations; Therapeutic exercise; Endurance training;Cognitive reorientation;Patient/family training;Equipment eval/education; Bed mobility;Gait training   Progress Progressing toward goals   Recommendation   Recommendation 24 hour supervision/assist;Home PT; Home with family support   Equipment Recommended Wheelchair   PT Therapy Minutes   PT Time In 1330   PT Time Out 1430   PT Total Time (minutes) 60   PT Mode of treatment - Individual (minutes) 60   PT Mode of treatment - Concurrent (minutes) 0   PT Mode of treatment - Group (minutes) 0   PT Mode of treatment - Co-treat (minutes) 0   PT Mode of Teatment - Total time(minutes) 60 minutes   Therapy Time missed   Time missed?  No     GARFIELD Abad

## 2017-11-13 NOTE — PHYSICAL THERAPY NOTE
Physical Therapy Progress Note   11/13/17 1300   Pain Assessment   Pain Assessment 0-10   Pain Score No Pain   Restrictions/Precautions   Precautions Fall Risk   Weight Bearing Restrictions No   ROM Restrictions No   Cognition   Overall Cognitive Status WFL   Arousal/Participation Alert   Attention Within functional limits   Orientation Level Oriented X4   Memory Decreased short term memory;Decreased recall of recent events;Decreased recall of precautions   Following Commands Follows one step commands with increased time or repetition   Subjective   Subjective denies pain; fatigued; no other c/o;    Bed Mobility   Able to Roll Left to Right;Right to Left;Scoot Up   Findings Andreas   Transfer Bed/Chair/Wheelchair   Limitations Noted In Balance; Coordination   Adaptive Equipment Roller Walker   Sit Pivot Supervision   Stand Pivot Minimal Assist   Sit to Stand Minimal Assist   Stand to Sit Minimal Assist   Supine to Sit Minimal Assist   Sit to Supine Minimal Assist   Findings S-Andreas   Bed, Chair, Wheelchair Transfer (FIM) 4 - Patient completes 75% of all tasks   Ambulation   Primary Discharge Mode of Locomotion Wheelchair   Walk Assist Level Maximum Assist   Gait Pattern Ataxic;R knee lolis;Decreased foot clearance; Slow Barb; Improper weight shift   Assist Device Sheryl Concetta Branden Walked (feet) 8 ft   Limitations Noted In Balance; Coordination;Device Management; Endurance; Heel Strike;Posture; Safety; Sequencing;Speed;Strength;Swing   Findings 6' with maxA and RW   Walking (FIM) 1 - Patient ambulates less than 49 feet regardless of assist/device/set up   Wheelchair mobility   Wheelchair Assist Level Minimum Assist   Method Right upper extremity; Left lower extremity   Needs Assist With Remove Leg Rest;Replace Leg Rest   Distance Level Surface (feet) 20 ft   Findings was trying to propel with only one UE; did not understand why that would not work and refused to use L UE   Wheelchair (FIM) 1 - Patient completes less than 25% of all tasks   Stairs   Findings NT   Toilet Transfer   Surface Assessed Raised Toilet   Limitations Noted In Balance; Endurance   Adaptive Equipment Grab Bar   Positioning Concerns Safety   Findings sit pivot or modified stand pivot using grab bars to/from toilet; Toilet Transfer (FIM) 4 - Patient requires steadying assist or light touching   Therapeutic Interventions   Strengthening seated TE (marches, LAQ, hip abd/add, ankle pumps, glute sets, quad sets, HS sets)   Flexibility B HS and gastroc stretching   Equipment Use   NuStep x15 minutes, level 2   Assessment   Treatment Assessment Pt seated in bedside recliner prior to session; STS with S; SPT with mod/maxA d/t uncoordinated B LE and weak R LE (lolis); sit pivot with S; bed mobility with S; w/c propel 20' d/t pt not using (and refusing to use) L UE; pt instructed in seated TE (as above) and NuStep x15 minutes, level 2; manual B LE stretching; finished session supine in bed with all needs in reach and alarms active; recommend cont PT POC; Family/Caregiver Present no   Problem List Decreased strength;Decreased range of motion;Decreased endurance; Impaired balance;Decreased mobility   Barriers to Discharge Inaccessible home environment;Decreased caregiver support   PT Barriers   Physical Impairment Decreased strength;Decreased endurance; Impaired balance;Decreased mobility; Decreased coordination;Pain;Decreased skin integrity   Functional Limitation Car transfers; Ramp negotiation;Stair negotiation;Standing;Transfers; Walking; Wheelchair management   Plan   Treatment/Interventions ADL retraining;Functional transfer training;LE strengthening/ROM; Elevations; Therapeutic exercise; Endurance training;Cognitive reorientation;Patient/family training;Equipment eval/education; Bed mobility;Gait training   Progress Progressing toward goals   Recommendation   Recommendation 24 hour supervision/assist;Home PT; Home with family support   Equipment Recommended Wheelchair   PT Therapy Minutes   PT Time In 1300   PT Time Out 1430   PT Total Time (minutes) 90   PT Mode of treatment - Individual (minutes) 60   PT Mode of treatment - Concurrent (minutes) 30   PT Mode of treatment - Group (minutes) 0   PT Mode of treatment - Co-treat (minutes) 0   PT Mode of Teatment - Total time(minutes) 90 minutes   Therapy Time missed   Time missed?  No     Elizabeth Door, PTA

## 2017-11-14 PROCEDURE — 97535 SELF CARE MNGMENT TRAINING: CPT

## 2017-11-14 PROCEDURE — 77386 HB NTSTY MODUL RAD TX DLVR CPLX: CPT | Performed by: RADIOLOGY

## 2017-11-14 PROCEDURE — 97530 THERAPEUTIC ACTIVITIES: CPT

## 2017-11-14 RX ADMIN — ACETAMINOPHEN 975 MG: 325 TABLET, FILM COATED ORAL at 21:48

## 2017-11-14 RX ADMIN — ACETAMINOPHEN 975 MG: 325 TABLET, FILM COATED ORAL at 15:09

## 2017-11-14 RX ADMIN — HEPARIN SODIUM 5000 UNITS: 5000 INJECTION, SOLUTION INTRAVENOUS; SUBCUTANEOUS at 05:55

## 2017-11-14 RX ADMIN — MORPHINE SULFATE 15 MG: 15 TABLET, EXTENDED RELEASE ORAL at 18:59

## 2017-11-14 RX ADMIN — ACETAMINOPHEN 975 MG: 325 TABLET, FILM COATED ORAL at 05:55

## 2017-11-14 RX ADMIN — ONDANSETRON 4 MG: 4 TABLET, ORALLY DISINTEGRATING ORAL at 20:26

## 2017-11-14 RX ADMIN — MORPHINE SULFATE 15 MG: 15 TABLET, EXTENDED RELEASE ORAL at 09:22

## 2017-11-14 RX ADMIN — Medication 1 TABLET: at 21:48

## 2017-11-14 RX ADMIN — TOLTERODINE TARTRATE 4 MG: 4 CAPSULE, EXTENDED RELEASE ORAL at 11:43

## 2017-11-14 RX ADMIN — LEVOTHYROXINE SODIUM 100 MCG: 100 TABLET ORAL at 05:55

## 2017-11-14 RX ADMIN — DEXAMETHASONE 4 MG: 4 TABLET ORAL at 18:59

## 2017-11-14 RX ADMIN — HEPARIN SODIUM 5000 UNITS: 5000 INJECTION, SOLUTION INTRAVENOUS; SUBCUTANEOUS at 21:48

## 2017-11-14 RX ADMIN — HEPARIN SODIUM 5000 UNITS: 5000 INJECTION, SOLUTION INTRAVENOUS; SUBCUTANEOUS at 15:09

## 2017-11-14 RX ADMIN — ALPRAZOLAM 0.25 MG: 0.25 TABLET ORAL at 07:41

## 2017-11-14 RX ADMIN — DEXAMETHASONE 4 MG: 4 TABLET ORAL at 05:55

## 2017-11-14 RX ADMIN — PANTOPRAZOLE SODIUM 40 MG: 40 TABLET, DELAYED RELEASE ORAL at 05:55

## 2017-11-14 RX ADMIN — LACTULOSE 20 G: 20 SOLUTION ORAL at 09:21

## 2017-11-14 RX ADMIN — OXYCODONE HYDROCHLORIDE 10 MG: 10 TABLET ORAL at 07:37

## 2017-11-14 RX ADMIN — DEXAMETHASONE 4 MG: 4 TABLET ORAL at 23:24

## 2017-11-14 RX ADMIN — AMLODIPINE BESYLATE 5 MG: 5 TABLET ORAL at 09:21

## 2017-11-14 RX ADMIN — PREGABALIN 50 MG: 50 CAPSULE ORAL at 09:21

## 2017-11-14 RX ADMIN — PREGABALIN 50 MG: 50 CAPSULE ORAL at 18:59

## 2017-11-14 RX ADMIN — LIDOCAINE 1 PATCH: 50 PATCH CUTANEOUS at 09:25

## 2017-11-14 RX ADMIN — DEXAMETHASONE 4 MG: 4 TABLET ORAL at 11:44

## 2017-11-14 NOTE — PROGRESS NOTES
11/14/17 1000   Pain Assessment   Pain Assessment No/denies pain   Pain Score 4   Pain Location Arm   Pain Orientation Left   Hospital Pain Intervention(s) Medication (See MAR); Repositioned   Restrictions/Precautions   Precautions Fall Risk;Pain   Cognition   Overall Cognitive Status [facility]   Arousal/Participation Alert; Cooperative   Attention Attends with cues to redirect   Orientation Level Oriented X4   Memory Decreased short term memory;Decreased recall of recent events;Decreased recall of precautions   Following Commands Follows one step commands with increased time or repetition   Subjective   Subjective pt was agreeable to have FT with son and DIL   QI: Roll Left and Right   Assistance Needed Supervision   Roll Left and Right CARE Score 4   QI: Sit to Lying   Assistance Needed Physical assistance   Assistance Provided by [place] Less than 25%   Sit to Lying CARE Score 3   QI: Lying to Sitting on Side of Bed   Assistance Needed Supervision;Verbal cues   Lying to Sitting on Side of Bed CARE Score 4   QI: Sit to Stand   Assistance Needed Physical assistance   Assistance Provided by [place] Less than 25%   Comment min-CG   Sit to Stand CARE Score 3   Bed Mobility   Findings S    QI: Chair/Bed-to-Chair Transfer   Assistance Needed Physical assistance   Assistance Provided by [place] 50%-74%   Comment min   Chair/Bed-to-Chair Transfer CARE Score 2   Transfer Bed/Chair/Wheelchair   Limitations Noted In LE Strength;UE Strength; Endurance; Coordination;Balance   Adaptive Equipment None   Sit Pivot Minimal Assist   Sit to Stand Minimal Assist   Stand to Sit Contact Guard   Supine to Sit Supervision   Sit to Supine Minimal Assist   Findings education focused on proper technique ayleen in blocking R knee during the transfer for safety  Bed, Chair, Wheelchair Transfer (FIM) 3 - [place] needs to lift, boost or assist to stand OR sit   Ambulation   Does the patient walk?  1  No, and walking goal is clinically indicated ;0  No, and walking goal is not clinically indicated  Primary Discharge Mode of Locomotion Wheelchair   Walking (FIM) 0 - Activity does not occur   QI: Wheel 50 Feet with Two Turns   Assistance Needed Supervision;Verbal cues   Wheel 50 Feet with Two Turns CARE Score 4   QI: Wheel 150 Feet   Assistance Needed Supervision;Verbal cues   Wheel 150 Feet CARE Score 4   Wheelchair mobility   QI: Does the patient use a wheelchair? 1  Yes   QI: Indicate the type of wheelchair 1  Manual   Wheelchair Assist Level Supervision   Method Right upper extremity; Left upper extremity   Distance Level Surface (feet) 100 ft   Findings extra time with cues on technique during turns   Wheelchair (FIM) 2 - Patient wheels between 50 - 149 feet regardless of assist/set up   Stairs   Type Stairs   # of Steps 2   Findings FT with pt's son bumping up/down the w/c on step while pt on w/c    QI: Toilet Transfer   Assistance Needed Physical assistance   Assistance Provided by [place] 25%-49%   Toilet Transfer CARE Score 3   Toilet Transfer   Surface Assessed Raised Toilet   Toilet Transfer (FIM) 4 - Patient completes 75% of all tasks   Assessment   Treatment Assessment AM session focused on FT with pt's son and DIL including bed/chair/toilet transfer focusing on safety and proper technique and education to let pt participate in the task versus lifting her up or doing everything for her  also went over with family on w/c management i e arm rest, lock, anti -tipper & leg rest as well as education on pressure relief technique and repositioning  will continue to work on strengthening and functional mobility training  Family/Caregiver Present Princess Zhong   PT Family training done with: Family demonstrated good understanding of therapy recommendation for pt to be w/c level at d/c  Also demonstrated good carry over of proper technique and expressed confidence of being able to provide appropriate assistance to the pt at home      Problem List Decreased strength;Decreased endurance; Impaired balance;Decreased mobility; Decreased coordination;Decreased safety awareness; Impaired sensation;Pain;Decreased skin integrity   Barriers to Discharge None   Plan   Treatment/Interventions Patient/family training   Progress Progressing toward goals   Recommendation   Recommendation 24 hour supervision/assist;Home with family support;Home PT   Equipment Recommended Wheelchair   Date ordered 11/14/17   PT Therapy Minutes   PT Time In 1000   PT Time Out 1100   PT Total Time (minutes) 60   PT Mode of treatment - Individual (minutes) 60   PT Mode of treatment - Concurrent (minutes) 0   PT Mode of treatment - Group (minutes) 0   PT Mode of treatment - Co-treat (minutes) 0   PT Mode of Teatment - Total time(minutes) 60 minutes   Therapy Time missed   Time missed?  No

## 2017-11-14 NOTE — PROGRESS NOTES
Progress Note - Neuropsychology/Psychology   Sammy Brar 81 y o  female MRN: [id number]    Unit/Bed#: -01 Encounter: [id number]      Subjective:   Pt presented in anxious mood, constricted affect  Discussed pt frustration related to health issues and difficulties in rehab, especially making progress on goals  Explored pt emotions about her diagnosis and decisions about her treatment  Reviewed coping skills to help pt manage anxiety; provided supportive counseling  Assessment:  Pt is experiencing anxiety due to the uncertain nature of future health outcomes; pt feels unsettled about what next steps she will take in her treatment, and wants to gain all information she can before making decisions about her treatment  Dx: F43 25 Adjustment disorder with depressed and anxious mood  Code: 32324    Plan:  Continue individual therapy sessions while pt is at [facility]  Objective:     Vitals: Blood pressure 121/71, pulse 81, temperature 97 8 °F (36 6 °C), temperature source Oral, resp  rate 18, height 5' 2" (1 575 m), weight 69 kg (152 lb 1 9 oz), SpO2 98 %  ,Body mass index is 27 82 kg/m²        Intake/Output Summary (Last 24 hours) at 11/14/17 1018  Last data filed at 11/14/17 0830   Gross per 24 hour   Intake              460 ml   Output              350 ml   Net              110 ml         Invasive Devices          No matching active lines, drains, or airways

## 2017-11-14 NOTE — PROGRESS NOTES
Internal Medicine Progress Note  Patient: Teena Ocasio  Age/sex: 81 y o  female  Medical Record #: [id number]      ASSESSMENT/PLAN:  Teena Ocasio is seen and examined and mangement for following issues:    1  Cervical cord compression with myelopathy/Mass at [facility]: Likely related to metastatic disease  Currently undergoing XRT for 10 sessions to lumbar and cervical spine  Continue Decadron 4mg Q6 hours  Pain control  Therapy per primary team    2  Metastatic breast cancer: patient with moderate left pleural effusion, multiple hepatic lesions, possible adrenal mass and multiple bony lesions  Overall prognosis poor  Outpatient follow up with primary oncologist  Previously on Xeloda  3  Left pleural effusion: likely malignant  Monitor respiratory status  Currently denies complaints of SOB  4  Hypertension: continue norvasc and monitor blood pressure  Well controlled  5  Hypothyroidism: continue levothyroxine  6  Peripheral neuropathy: continue Lyrica; reduced to BID dosing per patient request  7  DVT prophylaxis: SQ heparin  8  Hyponatremia:  Stable at 133  Asymptomatic  Will monitor  9  Overactive bladder:  Detrol LA started  10  Chest pain: All VSS/ EKG reveals frequent PVCs otherwise WNL, Troponin negative x 3  Anxiolytic administered  ? Anxiety related, vs from bony mets? No further pain reported  11  LUE Edema: has component of lymphedema with an increase in edema over the weekend  Doppler negative  Continue compression garment                Subjective: Patient seen and examined  Pt reports that she is fatigued today  She denies any other complaints       ROS:   GI: denies abdominal pain, change bowel habits or reflux symptoms  Neuro: No new neurologic changes  Respiratory: No Cough, SOB  Cardiovascular: No CP, palpitations     Scheduled Meds:    acetaminophen 975 mg Oral Q8H [facility]   amLODIPine 5 mg Oral Daily   dexamethasone 4 mg Oral Q6H [facility]   heparin (porcine) 5,000 Units Subcutaneous Q8H [address]   lactulose 20 g Oral BID   levothyroxine 100 mcg Oral Early Morning   lidocaine 1 patch Transdermal Daily   morphine 15 mg Oral BID   pantoprazole 40 mg Oral Early Morning   pregabalin 50 mg Oral BID   senna-docusate sodium 1 tablet Oral HS   tolterodine 4 mg Oral Daily       Labs:       Results from last 7 days  Lab Units 11/13/17  0616 11/09/17  0506   WBC Thousand/uL 10 59* 10 36*   HEMOGLOBIN g/dL 11 7 11 5   HEMATOCRIT % 34 6* 34 0*   PLATELETS Thousands/uL 191 206       Results from last 7 days  Lab Units 11/13/17  0616 11/09/17  0506   SODIUM mmol/L 133* 133*   POTASSIUM mmol/L 4 7 4 9   CHLORIDE mmol/L 101 100   CO2 mmol/L 25 27   BUN mg/dL 26* 27*   CREATININE mg/dL 0 37* 0 43*   GLUCOSE RANDOM mg/dL 100 92   CALCIUM mg/dL 7 4* 7 8*                  Glucose (mg/dL)   Date Value   11/13/2017 100   11/09/2017 92   11/06/2017 102   10/31/2017 101       Labs reviewed    Physical Examination:  Vitals:   Vitals:    11/13/17 0800 11/13/17 1716 11/13/17 2330 11/14/17 0721   BP: 126/86 106/62 112/64 121/71   Pulse: 86 88 78 81   Resp: 20 20 19 18   Temp: 97 6 °F (36 4 °C) 97 6 °F (36 4 °C) 97 6 °F (36 4 °C) 97 8 °F (36 6 °C)   TempSrc: Axillary Oral Oral Oral   SpO2: 100% 97% 97% 98%   Weight:       Height:           GEN: NAD, resting in bed  HEENT: NC/AT, EOMI  RESP: CTAB, no R/R/W  CV: +S1 S2, regular rate, no rubs, no chest wall tenderness  ABD: soft, NT, ND, normal BS   : No Singh  EXT: No edema bilateral LE, + mild edema of LUE, non pitting  Skin: No rashes  Neuro: AAOx3  Bilat pill rolling tremor  [ X ] Total time spent: 30 Mins and greater than 50% of this time was spent counseling/coordinating care        Jia Mitchell PA-C  Internal Medicine

## 2017-11-14 NOTE — SOCIAL WORK
Met w/pt, son and dtr in law, reviewed team udpate and dc for Friday 11/17  All are in agreement  Cm reviewed recommendations for contd hhc for rn pt and ot  They are in agreement with [facility] as long as they provide service in their area  Referral made to [facility]  Reviewed process of dme and how it's ordered  Pt's son will be present around 11am for dc on Friday

## 2017-11-14 NOTE — TEAM CONFERENCE
Acute RehabilitationTeam Conference Note  Date: 11/14/2017   Time: 10:48 AM       Patient Name:  Fernando Talbert       Medical Record Number: [id number]   YOB: 1945  Sex: Female          Room/Bed:  [place] [address]/[facility] 457-01  Payor Info:  Payor: Joyce Stagers / Plan: MEDICARE A AND B / Product Type: Medicare A & B Fee for Service /      Admitting Diagnosis: Cervical cord compression with myelopathy ([facility] ) [G95 20]   Admit Date/Time:  11/2/2017  4:59 PM  Admission Comments: No comment available     Primary Diagnosis:  Cervical cord compression with myelopathy (HCC)  Principal Problem: Cervical cord compression with myelopathy ([facility] )    Patient Active Problem List    Diagnosis Date Noted    Hypothyroidism 10/28/2017    Cervical cord compression with myelopathy ([facility] ) 10/27/2017    Breast cancer ([facility] ) 10/27/2017    Essential hypertension 10/27/2017       Physical Therapy:    Weight Bearing Status: Weight Bearing as Tolerated  Transfers: Supervision (sit pivot only)  Bed Mobility: Supervision  Amulation Distance (ft): 0 feet  Ambulation: Supervision  Assistive Device for Ambulation:  (NA)  Wheelchair Mobility Distance: 20 ft  Wheelchair Mobility: Minimal Assistance, Moderate Assistance  Number of Stairs: 0 (not safe)  Discharge Recommendations: Home with:  [address] with[de-identified] 24 Hour Assisteance, 24 Hour Supervision, Family Support, [facility], Home Physical Therapy    11/13/17  Pt cont to have difficulty with coordination B LE; safely sit pivots to R side with S; w/c propel short distances with min/modA but cannot propel independently; R LE knee lolis with weight bearing and attempts at ambulation; recommend pt stay at w/c level for mobility and use only sit pivot xfers; recommend cont PT POC;     11/6/17  Pt presents with RLE coordination deficits, dec motor control, dec balance, dec activity tolerance, pressure ulcers on buttocks, LUE edema (limb alert)   Pt lives with son who will be able to provide 24/7 S and assist  Currently recommending w/c as primary means of mobility 2* BLE buckling during ambulation in parallel bars  Pt showing improvements with sit pivots; can perform @ min A/CG level  Pt has difficulty with w/c mobility with obstacles or turns  Barriers are 3 JAN (although son will be able to assist)  Pt will cont to benefit from skilled PT to maximize I with sit pivots and w/c mobility and to dec overall burden of care  Occupational Therapy:  Eating: Supervision  Grooming: Supervision  Bathing: Minimal Assistance  Bathing: Minimal Assistance  Upper Body Dressing: Supervision  Lower Body Dressing: Maximum Assistance  Toileting: Maximum Assistance  Tub/Shower Transfer: Moderate Assistance (shower sit pivot to tub bench)  Toilet Transfer: Moderate Assistance  Cognition: Exceptions to WNL  Cognition: Decreased Memory, Decreased Attention  Orientation: Person, Place, Time, Situation  Discharge Recommendations: Home with:  [address] with[de-identified] 24 Hour Assistance, 24 Hour Supervision, Family Support, First Floor Setup, Home Occupational Therapy       Pt  Presents with decreased endurance, decreased standing balance, decreased coordination and strength BLE, increased pain, decreased core and UB strength  Pt  Motivated and actively participating in therapy  Pt  Has son whom is primary caregiver and will continue to A upon d/c  To continue OT services per POC to address the later and implement family training for safe d/c home  11/13/2017:  Pt has made limited gains with ADLs  Pt continues to be limited decreased endurance, decreased standing balance, decreased coordination and strength BLE, increased pain, decreased core and UB strength  Family training has been initiated       Speech Therapy:           No notes on file    Nursing Notes:  Appetite: Good  Diet Type: Cardiac                      Diet Patient/Family Education Complete: Yes    Type of Wound (LDA): Pressure ulcer  Pressure Ulcer 10/27/17 Buttocks Left (Active)   Staging Stage II 11/12/2017 11:45 PM   Wound Description Clean;Gray; White;Pink 11/13/2017  6:15 PM   Mehreen-wound Assessment Fragile;Clean 11/13/2017  6:15 PM   Shape round 11/12/2017 11:45 PM   Wound Length (cm) 0 8 cm 11/10/2017  8:45 AM   Wound Width (cm) 0 7 cm 11/10/2017  8:45 AM   Wound Depth (cm) 0 1 11/7/2017 10:35 AM   Calculated Wound Area (cm^2) 0 56 cm^2 11/10/2017  8:45 AM   Calculated Wound Volume (cm^3) 0 03 cm^3 11/7/2017 10:35 AM   Change in Wound Size % 0 11/7/2017 10:35 AM   Drainage Amount None 11/13/2017  6:15 PM   Treatment Cleansed; Offload;Softcare cushion;Turn & reposition 11/13/2017  6:15 PM   Dressing Protective barrier 11/13/2017  6:15 PM   Wound packed? No 11/7/2017 10:35 AM   Packing- # removed 0 11/7/2017 10:35 AM   Packing- # inserted 0 11/7/2017 10:35 AM   Patient Tolerance Tolerated well 11/11/2017  9:07 AM   Dressing Status Open to air 11/13/2017  6:15 PM       Pressure Ulcer 10/27/17 Sacrum Mid (Active)   Staging Stage II 11/12/2017 11:45 PM   Wound Description Clean;Fragile;Pink; White 11/13/2017  6:15 PM   Mehreen-wound Assessment Intact 11/13/2017  6:15 PM   Shape oval 11/10/2017  8:45 AM   Wound Length (cm) 0 7 cm 11/10/2017  8:45 AM   Wound Width (cm) 0 9 cm 11/10/2017  8:45 AM   Wound Depth (cm) 0 1 11/7/2017 10:35 AM   Calculated Wound Area (cm^2) 0 63 cm^2 11/10/2017  8:45 AM   Calculated Wound Volume (cm^3) 0 02 cm^3 11/7/2017 10:35 AM   Change in Wound Size % 33 33 11/7/2017 10:35 AM   Drainage Amount None 11/13/2017  6:15 PM   Treatment Cleansed; Offload;Softcare cushion;Turn & reposition 11/13/2017  6:15 PM   Dressing Protective barrier 11/13/2017  6:15 PM   Wound packed? No 11/7/2017 10:35 AM   Packing- # removed 0 11/7/2017 10:35 AM   Packing- # inserted 0 11/7/2017 10:35 AM   Patient Tolerance Tolerated well 11/11/2017  9:07 AM   Dressing Status Reinforced; Open to air 11/13/2017  6:15 PM                    Type of Wound Patient/Family Education: Yes  Bladder: 1 - Total Assistance     Bladder Patient/Family Education: Yes  Bowel: 6 - Modified [place]     Bowel Patient/Family Education: Yes  Pain Location: Arm, Back  Pain Orientation: Left  Pain Score: 4                       Hospital Pain Intervention(s): Medication (See MAR), Repositioned  Pain Patient/Family Education: Yes  Medication Management/Safety  Injectable:  (heparin)  Safe Administration: Yes  Medication Patient/Family Education Complete: Yes    Pt admitted on 11/2 on [facility] for Cervical cord compression with myelopathy/Mass at [facility]: Likely related to metastatic disease  Currently undergoing XRT for 10 sessions to lumbar and cervical spine  Started 11/3  Continue Decadron 4mg Q6 hours Pain control with ms contin and tylenol atc and oxycodone prn  Pt is occasionally inc of bladder  Pt has 2 stage 2 decubs on sacrum/ buttocks  Pt has Hx:  lumpectomy-   Metastatic breast cancer: patient with moderate left pleural effusion, multiple hepatic lesions, possible adrenal mass and multiple bony lesions  Overall prognosis poor     Pt has Right sided weakness and Numbness/tingling BUE and BLE and hand tremors  This week we will encourage independence with ADL's  We will monitor vital signs, lab results and adequate pain control  We will monitor skin integrity and provide frequent repositioning  We will increase safety awareness with transfers and keep pt free from falls  Case Management:     Discharge Planning  Living Arrangements: Children  Support Systems: Children, [employer]/wolfgang community, Friends/neighbors  Assistance Needed: PT/OT/ST  Type of Current Residence: Private residence  Current Home Care Services: No  Pt is participating with t herapy and plans on return home  Pt has been educated on potential recommendations for dc  Following to assist w/dc needs  Is the patient actively participating in therapies?  yes  List any modifications to the treatment plan:     Barriers Interventions Weakness from radiation, dx Therapy exercises, palliative care consult   Steps to enter Family education, w/c bumping   Hand sensation, swelling in left arm Sensory therapy             Is the patient making expected progress toward goals? yes  List any update or changes to goals:     Medical Goals: Patient will be medically stable for discharge to [facility] upon completion of rehab program and Patient will be able to manage medical conditions and comorbid conditions with medications and follow up upon completion of rehab program    Weekly Team Goals:   Rehab Team Goals  ADL Team Goal: Patient will require assist with ADLs with least restrictive device upon completion of rehab program  Bowel/Bladder Team Goal: Patient will return to premorbid level for bladder/bowel management upon completion of rehab program  Transfer Team Goal: Patient will be independent with transfers with least restrictive device upon completion of rehab program  Locomotion Team Goal: Patient will be independent with locomotion with least restrictive device upon completion of rehab program  Cognitive Team Goal: Patient will be independent for basic and complex tasks upon completion of rehab program    Discussion: in attendance to review pts progress is rn pt ot cm and physician  Pt is presenting with weakness from dx and radiation therapy  Family training has occurred and pt is stable at contact guard for sit pivots, adl's are max to total a  Recommendations are for [address] for rn pt and ot       Anticipated Discharge Date:  11/17/17

## 2017-11-14 NOTE — PROGRESS NOTES
Physical Medicine and Rehabilitation Progress Note:  Spinal Cord Dysfunction:  Non-Traumatic:  04 130 Other Non-Traumatic Cord Compression  Jim Womack 81 y o  female MRN: [id number]  Unit/Bed#: -01 Encounter: [id number]    Assessment:   Kem Cotto is a 81 y o  female who  has a past medical history of Arthritis; Asthma; Breast cancer ([address] ); Disease of thyroid gland; and Hypertension  and presented to the [facility] after outpatient MRI demonstrated cord compression  Palliative treatment to spine started  Surgical intervention not recommended due to extent of metastasis  She was accepted to [facility] on 11/03/17  Subjective: No acute events overnight  Rani Beasley was seen while working with therapies in hallways, along with family  She denies any CP or SOB  Discussed progress with son and daughter-in-law  ROS: A 10-point ROS was performed  Negative except as listed above  Restrictions include: Fall precautions    Disposition: Reteam    Plan:  # Cord compression, myelopathy  - Acute comprehensive interdisciplinary inpatient rehabilitation including PT, OT, SLP, RN, CM, SW, dietary, psychology, etc   - Appreciate Internal Medicine following - Dr Linda Saha service      - secondary to spinal mets  - undergoing XRT (total of 10 sessions)  - Continue steroids    # Left lateral leg pain  - likely due to tight IT-band  - will instruct therapy to perform stretching    # Hyponatremia  - continue to monitor    Results from last 7 days  Lab Units 11/13/17  0616 11/09/17  0506   SODIUM mmol/L 133* 133*     # Mild Leukocytosis  - continue to monitor  - no fever or chills noted  - patient is having cough, states mucous is yellowish-green when she blows her nose  Continue mucinex to help thin out mucous        Results from last 7 days  Lab Units 11/13/17  0616 11/09/17  0506   WBC Thousand/uL 10 59* 10 36*     # Chest pain (resolved)  - EKG performed, per IM team, demonstrates multiple PVCs  - Troponins negative x 3  - Continue to monitor for now      # Metastatic breast CA  - f/u with oncology as outpatient  - noted to have edema to LUE, will require compression garment     # Neuropathy  - continue lyrica     # Anemia (improved)  - Likely reactive  - continue monitoring CBC       Results from last 7 days  Lab Units 11/13/17  0616 11/09/17  0506   HEMOGLOBIN g/dL 11 7 11 5     # HTN  - Continue Amlodipine     Temp:  [97 6 °F (36 4 °C)-97 8 °F (36 6 °C)] 97 8 °F (36 6 °C)  HR:  [78-88] 81  Resp:  [18-20] 18  BP: (106-121)/(62-71) 121/71    # Hypothyroid  - Continue levothyroxine      # Pain  - Continue tylenol PRN, for max of 3gm daily     - Continue oxycodone   - Continue MS Contin  - This patient record was searched in the [place] PDMP and the risks/benefits of prescribing this patient medications with high abuse/dependence potential was assessed and determined to be medically appropriate at this time  I could not find any prescriptions in [place], [place]  Patient reports she was getting oxycodone 10mg TID (chart review notes she was taking 10mg Q6h PRN), and MS contin 15mg BID      # Rehab Psych  - Continue alprazolam PRN  - Neuropsych consult, appreciate recs     # FENA/prophy  - Diet: cardiac   SLP and nutrition to monitor and adjust as necessary   - DVT prophy: Sequential compression device (Venodyne)  and Heparin  - GI ppx: Pantaprazole  - Bowel: colace , dulcolax suppository  and miralax PRN  - Nausea: Zofran  - Supplements: None  - Sleep: None      CODE: Level 1: Full Code    Objective:  Nursing staff reporting: no problems    Functional Update:  Physical Therapy Occupational Therapy   Weight Bearing Status: Weight Bearing as Tolerated  Transfers: Supervision (sit pivot only)  Bed Mobility: Supervision  Amulation Distance (ft): 0 feet  Ambulation: Supervision  Assistive Device for Ambulation:  (NA)  Wheelchair Mobility Distance: 20 ft  Wheelchair Mobility: Minimal Assistance, Moderate Assistance  Number of Stairs: 0 (not safe)  Discharge Recommendations: Home with:  DC Home with[de-identified] 24 Hour Assisteance, 24 Hour Supervision, Family Support, First Floor Setup, Home Physical Therapy   Eating: Supervision  Grooming: Supervision  Bathing: Minimal Assistance  Bathing: Minimal Assistance  Upper Body Dressing: Supervision  Lower Body Dressing: Maximum Assistance  Toileting: Maximum Assistance  Tub/Shower Transfer: Moderate Assistance (shower sit pivot to tub bench)  Toilet Transfer: Moderate Assistance  Cognition: Exceptions to WNL  Cognition: Decreased Memory, Decreased Attention  Orientation: Person, Place, Time, Situation       Allergies and Medications per EMR    Physical Exam:  General: alert, no apparent distress, cooperative and comfortable  HENMT: Head: Normal, normocephalic, atraumatic  Eye: Normal external eye, conjunctiva, lids   Ears: Normal external ears    Nose: Normal external nose, mucus membranes  Pulmonary: chest expansion normal, no retractions, no accessory muscle usage  Abdomen: soft, nontender, nondistended, no masses or organomegaly  Skin/Extremity: no rashes, no erythema, LUE edema (which patient states is her baseline)  Neurologic: Awake alert orientedx3  Psych: normal mood, behavior, speech, dress, and thought processes    Diagnostic Studies:  Reviewed, no new imaging    Vitals: Reviewed   Temp:  [97 6 °F (36 4 °C)-97 8 °F (36 6 °C)] 97 8 °F (36 6 °C)  HR:  [78-88] 81  Resp:  [18-20] 18  BP: (106-121)/(62-71) 121/71   Intake/Output Summary (Last 24 hours) at 11/14/17 1039  Last data filed at 11/14/17 0830   Gross per 24 hour   Intake              460 ml   Output              350 ml   Net              110 ml        Laboratory: Reviewed  Lab Results   Component Value Date    HGB 11 7 11/13/2017    HCT 34 6 (L) 11/13/2017    WBC 10 59 (H) 11/13/2017     Lab Results   Component Value Date    BUN 26 (H) 11/13/2017     (L) 11/13/2017    K 4 7 11/13/2017     11/13/2017 GLUCOSE 100 11/13/2017    CREATININE 0 37 (L) 11/13/2017     No results found for: PROTIME, INR     Patient Active Problem List   Diagnosis    Cervical cord compression with myelopathy (HCC)    Breast cancer ([facility] )    Essential hypertension    Hypothyroidism       ** Please Note: Fluency Direct voice to text software may have been used in the creation of this document  **    [ x ] Total time spent: 30 Mins, and greater than 50% of this time was spent counseling/coordinating care

## 2017-11-14 NOTE — PROGRESS NOTES
11/14/17 1240   Pain Assessment   Pain Assessment No/denies pain   Restrictions/Precautions   Precautions Fall Risk   Cognition   Overall Cognitive Status WFL   Arousal/Participation Alert; Cooperative   Attention Attends with cues to redirect   Orientation Level Oriented X4   Memory Decreased short term memory;Decreased recall of recent events;Decreased recall of precautions   Following Commands Follows one step commands with increased time or repetition   Subjective   Subjective pt was agreeable to have PT but requested to use BSC first     QI: Roll Left and Right   Assistance Needed Supervision   Roll Left and Right CARE Score 4   QI: Sit to [facility] extra time needed but able to complete task on her own    Sit to Lying CARE Score 4   QI: Lying to Sitting on Side of Bed   Assistance Needed Supervision   Lying to Sitting on Side of Bed CARE Score 4   QI: Sit to Stand   Assistance Needed Physical assistance   Assistance Provided by [place] 25%-49%   Comment repeated sit to stand transfer to completing toileting task   Sit to Stand CARE Score 3   Bed Mobility   Findings S   QI: Chair/Bed-to-Chair Transfer   Assistance Needed Physical assistance   Assistance Provided by [place] 50%-74%   Comment min-mod   Chair/Bed-to-Chair Transfer CARE Score 2   Transfer Bed/Chair/Wheelchair   Limitations Noted In LE Strength;UE Strength; Endurance;Balance; Coordination   Adaptive Equipment None   Sit Pivot Minimal Assist;Moderate Assist   Sit to Stand Minimal Assist   Stand to Sit Minimal Assist   Supine to Sit Supervision   Sit to Supine Supervision   Bed, Chair, Wheelchair Transfer (FIM) 2 - Talala needs to lift or boost to rise AND assist to sit   Ambulation   Primary Discharge Mode of Locomotion Wheelchair   Walking (FIM) 0 - Activity does not occur   Wheelchair mobility   Method Right upper extremity; Left upper extremity   QI: Toilet Transfer   Assistance Needed Physical assistance Assistance Provided by [place] 50%-74%   Comment min-mod with repeated transfer   Toilet Transfer CARE Score 2   Toilet Transfer   Surface Assessed Raised Toilet   Limitations Noted In Balance; Endurance;Sensation;UE Strength;LE Strength   Findings sit pivot bed <-> BSC x 4 reps, then repeated sit to stand to complete hygiene   Assessment   Treatment Assessment Pt participated with repeated sit pivot bed<-> BSC transfer and sit to stand transfer to complete self care after a BM  Pt participated with repeated transfer however only able to tolerate static standing for about 20-30 secs requiring repeated seated rest breaks before toileting task is completed  continues to require 2 person assist for toileting  Pt was put back to bed at end of tx session  PT will continue to work on standing tolerance, transfer training and w/c mobility training in preparation for d/c  Problem List Decreased strength;Decreased endurance; Impaired balance;Decreased mobility; Decreased coordination;Decreased safety awareness; Impaired sensation;Pain;Decreased skin integrity   Barriers to Discharge None   Plan   Treatment/Interventions Therapeutic exercise; Bed mobility; Functional transfer training   Progress Progressing toward goals   Recommendation   Recommendation 24 hour supervision/assist;Home PT   PT Therapy Minutes   PT Time In 1240   PT Time Out 1310   PT Total Time (minutes) 30   PT Mode of treatment - Individual (minutes) 30   PT Mode of treatment - Concurrent (minutes) 0   PT Mode of treatment - Group (minutes) 0   PT Mode of treatment - Co-treat (minutes) 0   PT Mode of Teatment - Total time(minutes) 30 minutes   Therapy Time missed   Time missed?  No

## 2017-11-14 NOTE — PROGRESS NOTES
11/14/17 1000   Pain Assessment   Pain Assessment No/denies pain   Pain Score 4   Pain Location Arm   Pain Orientation Left   Hospital Pain Intervention(s) Medication (See MAR); Repositioned   Restrictions/Precautions   Precautions Fall Risk;Pain   Cognition   Overall Cognitive Status [facility]   Arousal/Participation Alert; Cooperative   Attention Attends with cues to redirect   Orientation Level Oriented X4   Memory Decreased short term memory;Decreased recall of recent events;Decreased recall of precautions   Following Commands Follows one step commands with increased time or repetition   Subjective   Subjective pt was agreeable to have FT with son and DIL   QI: Roll Left and Right   Assistance Needed Supervision   Roll Left and Right CARE Score 4   QI: Sit to Lying   Assistance Needed Physical assistance   Assistance Provided by [place] Less than 25%   Sit to Lying CARE Score 3   QI: Lying to Sitting on Side of Bed   Assistance Needed Supervision;Verbal cues   Lying to Sitting on Side of Bed CARE Score 4   QI: Sit to Stand   Assistance Needed Physical assistance   Assistance Provided by [place] Less than 25%   Comment min-CG   Sit to Stand CARE Score 3   Bed Mobility   Findings S    QI: Chair/Bed-to-Chair Transfer   Assistance Needed Physical assistance   Assistance Provided by [place] 50%-74%   Comment min   Chair/Bed-to-Chair Transfer CARE Score 2   Transfer Bed/Chair/Wheelchair   Limitations Noted In LE Strength;UE Strength; Endurance; Coordination;Balance   Adaptive Equipment None   Sit Pivot Minimal Assist   Sit to Stand Minimal Assist   Stand to Sit Contact Guard   Supine to Sit Supervision   Sit to Supine Minimal Assist   Findings education focused on proper technique ayleen in blocking R knee during the transfer for safety  Bed, Chair, Wheelchair Transfer (FIM) 3 - [place] needs to lift, boost or assist to stand OR sit   Ambulation   Does the patient walk?  1  No, and walking goal is clinically indicated ;0  No, and walking goal is not clinically indicated  Primary Discharge Mode of Locomotion Wheelchair   Walking (FIM) 0 - Activity does not occur   QI: Wheel 50 Feet with Two Turns   Assistance Needed Supervision;Verbal cues   Wheel 50 Feet with Two Turns CARE Score 4   QI: Wheel 150 Feet   Assistance Needed Supervision;Verbal cues   Wheel 150 Feet CARE Score 4   Wheelchair mobility   QI: Does the patient use a wheelchair? 1  Yes   QI: Indicate the type of wheelchair 1  Manual   Wheelchair Assist Level Supervision   Method Right upper extremity; Left upper extremity   Distance Level Surface (feet) 100 ft   Findings extra time with cues on technique during turns   Wheelchair (FIM) 2 - Patient wheels between 50 - 149 feet regardless of assist/set up   Stairs   Type Stairs   # of Steps 2   Findings FT with pt's son bumping up/down the w/c on step while pt on w/c    QI: Toilet Transfer   Assistance Needed Physical assistance   Assistance Provided by [place] 25%-49%   Toilet Transfer CARE Score 3   Toilet Transfer   Surface Assessed Raised Toilet   Toilet Transfer (FIM) 4 - Patient completes 75% of all tasks   Assessment   Treatment Assessment AM session focused on FT with pt's son and DIL including bed/chair/toilet transfer focusing on safety and proper technique and education to let pt participate in the task versus lifting her up or doing everything for her  also went over with family on w/c management i e arm rest, lock, anti -tipper & leg rest as well as education on pressure relief technique and repositioning  will continue to work on strengthening and functional mobility training  Family/Caregiver Present [place]   PT Family training done with: Family demonstrated good understanding of therapy recommendation for pt to be w/c level at d/c  Also demonstrated good carry over of proper technique and expressed confidence of being able to provide appropriate assistance to the pt at home      Problem List Decreased strength;Decreased endurance; Impaired balance;Decreased mobility; Decreased coordination;Decreased safety awareness; Impaired sensation;Pain;Decreased skin integrity   Barriers to Discharge None   Plan   Treatment/Interventions Patient/family training   Progress Progressing toward goals   Recommendation   Recommendation 24 hour supervision/assist;Home with family support;Home PT   Equipment Recommended Wheelchair   Date ordered 11/14/17   PT Therapy Minutes   PT Time In 1000   PT Time Out 1100   PT Total Time (minutes) 60   PT Mode of treatment - Individual (minutes) 30   PT Mode of treatment - Concurrent (minutes) 0   PT Mode of treatment - Group (minutes) 0   PT Mode of treatment - Co-treat (minutes) 30   PT Mode of Teatment - Total time(minutes) 60 minutes   Therapy Time missed   Time missed?  No

## 2017-11-14 NOTE — PROGRESS NOTES
11/14/17 0910   Dressing/Undressing Clothing   Remove LB Clothes Socks; Shoes   Don LB Clothes Undergarment;Socks; Shoes   Limitations Noted In Balance; Endurance   Positioning Supported Sit;Standing   LB Dressing (FIM) 1 - Patient completes less than 25% of all tasks   QI: Chair/Bed-to-Chair Transfer   Assistance Needed Physical assistance   Assistance Provided by [place] 75% or more   Chair/Bed-to-Chair Transfer CARE Score 2   Transfer Bed/Chair/Wheelchair   Limitations Noted In Balance; Endurance   Sit Pivot Maximum Assist   Bed, Chair, Wheelchair Transfer (FIM) 2 - [place] needs to lift or boost to rise AND assist to sit   QI: Toilet Transfer   Assistance Needed Physical assistance   Assistance Provided by [place] 75% or more   Toilet Transfer CARE Score 2   Toilet Transfer   Surface Assessed Drop Arm Commode   Transfer Technique Sit Pivot   Limitations Noted In Balance; Endurance;ROM;Safety   Positioning Concerns Safety   Cognition   Overall Cognitive Status WFL   Arousal/Participation Cooperative   Attention Attends with cues to redirect   Assessment   Treatment Assessment Pt  Participated in OT tx session focusing on d/cplanning, toileting, toilet txfers, family training co tx with PT  Discussed pt  Self goals and interest in completing ADL [place]  Pt  Reports she does not prefer use of reacher and LB dressing is frustrating  Discussed delegating tasks that are not as important to pt  And that are strenuous to son  Pt  Receptive  Pt  Son was Aing with LB dressing PTA  Pt  Continues to require max A to complete sit pivots blocking R knee  Trialed sit to stand to pull clothing over hips  Pt  Requires blocking of Knee with sit to stand  Once in stance with RW  Pt  Maintains balance well, while therapist A with clothing over hips  Trialed weightshifting on BSC for CM, however, pt  Unable to complete 2* decreased core and BLE strength  As result pt   Completed push up with therapist in front blocking R knee, however, pt  Relied heavily on UE's and BSC not secured  Recommend bracing BSC against wall for increased stability  Pt  States she prefers briefs that have side straps instead of pull ups  Pt  Son (Radha [id number]) and DIL Isaiasbunny Solorio) present for family training  Discussed pt  Needs from an ADL perspective  Edu  Positioning and blocking of R knee with sit to stand with RW or blocking in front with push up for CM over hips  Pt  Son receptive  Also educated bed level LB ADL prn pending pt  Fatigue  Pt  Son receptive  Pt  Son demo  Proper positioning with sit pivot txfers  Pt  Son reports willing and able to A with all txfers and ADL upon d/c  Recommend bed rail to A with bed mobility and txfers  Pt  Has elevating HOB at home and height of bed is high  Discussed option of hospital bed 2* pt  Need for use of bed rail, adjustment of height of bed, especially for bed level ADLs  Pt  And son receptive, but will assess upon d/c  Do recommend home services to A with transition home and to review HEP, carryover A with ADL, body mechanics, adaptive utensils for self feeding  Edu  Pt  And family weightshifting and skin integrity 2* buttock stage IIs  Family receptive  Edu  Pt  Decreased sensation of BLE and digits  Edu  Pt  Decreased ability to control RLE and need to block with ALL txfers  Family receptive  Pt  Declined further training and this time and plans to f/u with home therapy for HEP LUE, FM strengthening  Prognosis Fair   Problem List Decreased strength;Decreased range of motion;Decreased endurance; Impaired balance;Decreased mobility; Decreased safety awareness   Plan   Treatment/Interventions ADL retraining;Functional transfer training; Therapeutic exercise; Endurance training;Patient/family training;Equipment eval/education   Progress Progressing toward goals   Recommendation   OT Discharge Recommendation Home OT   OT Equipment ordered drop arm BSC, w/c    Date ordered 11/14/17   OT Therapy Minutes   OT Time In 9610   OT Time Out 5572 OT Total Time (minutes) 80   OT Mode of treatment - Individual (minutes) 80   OT Mode of treatment - Concurrent (minutes) 0   OT Mode of treatment - Group (minutes) 0   OT Mode of treatment - Co-treat (minutes) 0   OT Mode of Teatment - Total time(minutes) 80 minutes   Therapy Time missed   Time missed?  No

## 2017-11-14 NOTE — PROGRESS NOTES
11/14/17 0830   Pain Assessment   Pain Assessment No/denies pain   Pain Score No Pain   QI: Chair/Bed-to-Chair Transfer   Assistance Needed Physical assistance   Assistance Provided by [place] 75% or more   Chair/Bed-to-Chair Transfer CARE Score 2   Transfer Bed/Chair/Wheelchair   Limitations Noted In Balance; Endurance   Sit Pivot Maximum Assist;Assist x 1   Bed, Chair, Wheelchair Transfer (FIM) 2 - [place] needs to lift or boost to rise AND assist to sit   Assessment   Treatment Assessment Pt  Participated in OT tx session focusing on bed mobility, functional txfer  Pt  Returned from radiation and was supine in bed  Encouraged pt  To get OOB into recliner and eat breakfast  Pt  Stated decreased appetite, but receptive to getting OOB  Pt  Required min A bed mobility  Max a sit pivot to L from EOB to recliner  Pt  Required 3 attempts 2* decreased balance and strength  Pt  Family coming this a m  For FT for d/c planning  Prognosis Fair   Problem List Decreased strength;Decreased range of motion;Decreased endurance; Impaired balance;Decreased mobility; Decreased safety awareness   Plan   Treatment/Interventions ADL retraining;Functional transfer training; Therapeutic exercise; Endurance training;Patient/family training;Equipment eval/education   Progress Progressing toward goals   Recommendation   OT Discharge Recommendation Home OT   OT Therapy Minutes   OT Time In 0830   OT Time Out 0850   OT Total Time (minutes) 20   OT Mode of treatment - Individual (minutes) 20   OT Mode of treatment - Concurrent (minutes) 0   OT Mode of treatment - Group (minutes) 0   OT Mode of treatment - Co-treat (minutes) 0   OT Mode of Teatment - Total time(minutes) 20 minutes   Therapy Time missed   Time missed?  No

## 2017-11-14 NOTE — WOUND OSTOMY CARE
Progress Note - Wound   Octavia Womack 81 y o  female MRN: [id number]  Unit/Bed#: -01 Encounter: [id number]      Assessment:  Patient seen for weekly follow up visit with wound care  Patient seen Mariely Hair in recliner chair sitting on ROHO cushion  Patient agreeable and cooperative with assessment  Patient is incontinent of bowel and bladder  Nutrition is following this patient  Assist of one with standing using rolling walker  B/l heels are intact with no redness  R buttock is intact with healed area of scarring noted  Sacral stage 2 pressure injury - measurements are stable from previous assessment  100% pale pink would bed  Edges are dry attached intact with no maceration noted  Zuleika-wound is intact with no redness induration or fluctuance noted  L buttock stage 2 pressure injury - measurements are stable from previous assessment  100% pale pink wound bed appears to start to epithelialize  Edges are dry attached intact with no maceration noted  Zuleika-wound is intact with no redness induration or fluctuance noted  Will change plan of care to calazime rather than hydraguard for a more protective barrier as patient is incontinent and the wounds and zuleika-wound are not as dry appearing from last weeks assessment  Wound care will follow along with patient weekly  Plan:   1  Apply hydraguard to b/l heels BID and PRN for prevention and protection  2  Apply skin nourishing cream to the entire skin daily for moisture   3  Elevate heels off of bed with pillows to offload   4  Turn and reposition patient every 2 hours   5  Soft care cushion to chair when OOB  6  Cleanse L buttock and sacral stage 2 pressure injuries with soap and water, pat dry, apply calazime to wound bed and apply hydraguard to b/l zuleika-wound and buttocks TID and PRN   7   Wound care will follow along with patient weekly, please call with questions or concerns        Objective:    Vitals: Blood pressure 121/71, pulse 81, temperature 97 8 °F (36 6 °C), temperature source Oral, resp  rate 18, height 5' 2" (1 575 m), weight 69 kg (152 lb 1 9 oz), SpO2 98 %  ,Body mass index is 27 82 kg/m²  Pressure Ulcer 10/27/17 Buttocks Left (Active)   Staging Stage II 11/14/2017 11:00 AM   Wound Description Pink 11/14/2017 11:00 AM   [place]-wound Assessment Clean;Dry; Intact;Fragile 11/14/2017 11:00 AM   Shape round 11/12/2017 11:45 PM   Wound Length (cm) 0 7 cm 11/14/2017 11:00 AM   Wound Width (cm) 0 4 cm 11/14/2017 11:00 AM   Wound Depth (cm) 0 1 11/14/2017 11:00 AM   Calculated Wound Area (cm^2) 0 28 cm^2 11/14/2017 11:00 AM   Calculated Wound Volume (cm^3) 0 03 cm^3 11/14/2017 11:00 AM   Change in Wound Size % 0 11/14/2017 11:00 AM   Drainage Amount None 11/14/2017 11:00 AM   Treatment Cleansed;Elevated with pillow(s); Offload;Softcare cushion;Turn & reposition 11/14/2017 11:00 AM   Dressing Protective barrier 11/14/2017 11:00 AM   Wound packed? No 11/14/2017 11:00 AM   Packing- # removed 0 11/14/2017 11:00 AM   Packing- # inserted 0 11/14/2017 11:00 AM   Patient Tolerance Tolerated well 11/14/2017 11:00 AM   Dressing Status Open to air 11/14/2017 11:00 AM       Pressure Ulcer 10/27/17 Sacrum Mid (Active)   Staging Stage II 11/14/2017 11:00 AM   Wound Description Pink 11/14/2017 11:00 AM   Mehreen-wound Assessment Dry;Clean; Intact;Fragile 11/14/2017 11:00 AM   Shape oval 11/10/2017  8:45 AM   Wound Length (cm) 0 5 cm 11/14/2017 11:00 AM   Wound Width (cm) 0 4 cm 11/14/2017 11:00 AM   Wound Depth (cm) 0 1 11/14/2017 11:00 AM   Calculated Wound Area (cm^2) 0 2 cm^2 11/14/2017 11:00 AM   Calculated Wound Volume (cm^3) 0 02 cm^3 11/14/2017 11:00 AM   Change in Wound Size % 33 33 11/14/2017 11:00 AM   Drainage Amount None 11/14/2017 11:00 AM   Treatment Cleansed;Elevated with pillow(s); Offload;Softcare cushion;Turn & reposition 11/14/2017 11:00 AM   Dressing Protective barrier 11/14/2017 11:00 AM   Wound packed?  No 11/14/2017 11:00 AM   Packing- # removed 0 11/14/2017 11:00 AM   Packing- # inserted 0 11/14/2017 11:00 AM   Patient Tolerance Tolerated well 11/14/2017 11:00 AM   Dressing Status Open to air 11/14/2017 11:00 AM       Recommendations written as orders    Brenda Alcantara RN BSN

## 2017-11-15 PROCEDURE — 97110 THERAPEUTIC EXERCISES: CPT | Performed by: PHYSICAL THERAPIST

## 2017-11-15 PROCEDURE — 97530 THERAPEUTIC ACTIVITIES: CPT | Performed by: PHYSICAL THERAPIST

## 2017-11-15 PROCEDURE — 97110 THERAPEUTIC EXERCISES: CPT

## 2017-11-15 PROCEDURE — 97530 THERAPEUTIC ACTIVITIES: CPT

## 2017-11-15 RX ORDER — DEXAMETHASONE 4 MG/1
4 TABLET ORAL EVERY 12 HOURS SCHEDULED
Status: DISCONTINUED | OUTPATIENT
Start: 2017-11-19 | End: 2017-11-17 | Stop reason: HOSPADM

## 2017-11-15 RX ORDER — DEXAMETHASONE 4 MG/1
4 TABLET ORAL EVERY 8 HOURS SCHEDULED
Status: DISCONTINUED | OUTPATIENT
Start: 2017-11-15 | End: 2017-11-17 | Stop reason: HOSPADM

## 2017-11-15 RX ORDER — PREGABALIN 50 MG/1
50 CAPSULE ORAL 3 TIMES DAILY
Status: DISCONTINUED | OUTPATIENT
Start: 2017-11-15 | End: 2017-11-17 | Stop reason: HOSPADM

## 2017-11-15 RX ADMIN — MORPHINE SULFATE 15 MG: 15 TABLET, EXTENDED RELEASE ORAL at 09:57

## 2017-11-15 RX ADMIN — ACETAMINOPHEN 975 MG: 325 TABLET, FILM COATED ORAL at 05:11

## 2017-11-15 RX ADMIN — ACETAMINOPHEN 975 MG: 325 TABLET, FILM COATED ORAL at 15:07

## 2017-11-15 RX ADMIN — HEPARIN SODIUM 5000 UNITS: 5000 INJECTION, SOLUTION INTRAVENOUS; SUBCUTANEOUS at 05:10

## 2017-11-15 RX ADMIN — PANTOPRAZOLE SODIUM 40 MG: 40 TABLET, DELAYED RELEASE ORAL at 05:11

## 2017-11-15 RX ADMIN — HEPARIN SODIUM 5000 UNITS: 5000 INJECTION, SOLUTION INTRAVENOUS; SUBCUTANEOUS at 15:06

## 2017-11-15 RX ADMIN — LACTULOSE 20 G: 20 SOLUTION ORAL at 09:57

## 2017-11-15 RX ADMIN — PREGABALIN 50 MG: 50 CAPSULE ORAL at 18:34

## 2017-11-15 RX ADMIN — ONDANSETRON 4 MG: 4 TABLET, ORALLY DISINTEGRATING ORAL at 18:38

## 2017-11-15 RX ADMIN — AMLODIPINE BESYLATE 5 MG: 5 TABLET ORAL at 09:58

## 2017-11-15 RX ADMIN — PREGABALIN 50 MG: 50 CAPSULE ORAL at 09:58

## 2017-11-15 RX ADMIN — HEPARIN SODIUM 5000 UNITS: 5000 INJECTION, SOLUTION INTRAVENOUS; SUBCUTANEOUS at 22:05

## 2017-11-15 RX ADMIN — DEXAMETHASONE 4 MG: 4 TABLET ORAL at 22:05

## 2017-11-15 RX ADMIN — Medication 1 TABLET: at 22:05

## 2017-11-15 RX ADMIN — OXYCODONE HYDROCHLORIDE 10 MG: 10 TABLET ORAL at 05:11

## 2017-11-15 RX ADMIN — LIDOCAINE 1 PATCH: 50 PATCH CUTANEOUS at 09:59

## 2017-11-15 RX ADMIN — MORPHINE SULFATE 15 MG: 15 TABLET, EXTENDED RELEASE ORAL at 18:34

## 2017-11-15 RX ADMIN — ACETAMINOPHEN 975 MG: 325 TABLET, FILM COATED ORAL at 22:04

## 2017-11-15 RX ADMIN — ALPRAZOLAM 0.25 MG: 0.25 TABLET ORAL at 22:05

## 2017-11-15 RX ADMIN — TOLTERODINE TARTRATE 4 MG: 4 CAPSULE, EXTENDED RELEASE ORAL at 09:57

## 2017-11-15 RX ADMIN — OXYCODONE HYDROCHLORIDE 10 MG: 10 TABLET ORAL at 19:41

## 2017-11-15 RX ADMIN — LEVOTHYROXINE SODIUM 100 MCG: 100 TABLET ORAL at 05:11

## 2017-11-15 RX ADMIN — DEXAMETHASONE 4 MG: 4 TABLET ORAL at 15:07

## 2017-11-15 RX ADMIN — PREGABALIN 50 MG: 50 CAPSULE ORAL at 22:04

## 2017-11-15 RX ADMIN — DEXAMETHASONE 4 MG: 4 TABLET ORAL at 05:11

## 2017-11-15 NOTE — PROGRESS NOTES
11/15/17 1100   Pain Assessment   Pain Assessment 0-10   Pain Score 8   Pain Type Acute pain;Chronic pain   Pain Location Generalized   Hospital Pain Intervention(s) Medication (See MAR); Repositioned   Response to Interventions pt c/o constant pain "all over" her body with no relief   Restrictions/Precautions   Precautions Fall Risk;Limb alert   Cognition   Arousal/Participation Alert   Subjective   Subjective Pt c/o generalized pain "all over" her body; pt presents with flat affect today   QI: Chair/Bed-to-Chair Transfer   Assistance Needed Physical assistance   Assistance Provided by [place] Less than 25%   Chair/Bed-to-Chair Transfer CARE Score 3   Transfer Bed/Chair/Wheelchair   Limitations Noted In Balance; Coordination; Endurance;LE Strength   Adaptive Equipment None   Sit Pivot Contact Guard;Minimal Assist   Findings practiced sit pivots w/c <> mat   Bed, Chair, Wheelchair Transfer (FIM) 4 - Patient requires steadying assist or light touching   QI: Wheel 50 Feet with [place] assistance   Assistance Provided by [place] Less than 25%   Wheel 50 Feet with Two Turns CARE Score 3   Wheelchair mobility   QI: Does the patient use a wheelchair? 1  Yes   QI: Indicate the type of wheelchair 1  Manual   Wheelchair Assist Level Supervision;Minimum Assist   Method Right upper extremity; Left upper extremity   Needs Assist With Remove Leg Rest;Replace Leg Rest;Remove armrests;Replace armrests;Curbs;Locking Brakes;Obstacles;Press Release   Distance Level Surface (feet) 75 ft   Findings pt limited by LUE pain today   Wheelchair (FIM) 2 - Patient wheels between 50 - 149 feet regardless of assist/set up   Equipment Use   LE Bike x 10 min  Assessment   Treatment Assessment Pt c/o increased pain today and presents with flat affect throughout session  Focused on w/c mobility and transfers  Pt was limited with w/c mobility today 2* fatigue and inc LUE pain   Pt performs sit pivots @ CG/min A level and FT was performed with son regarding safe transfer techniques  Pt will cont to benefit from skilled PT to further improve overall strength and functional mobility  Barriers to Discharge None   PT Barriers   Physical Impairment Decreased strength;Decreased endurance; Impaired balance;Decreased mobility; Decreased coordination;Pain;Decreased skin integrity   Functional Limitation Car transfers; Ramp negotiation;Stair negotiation;Standing;Transfers; Walking; Wheelchair management   Plan   Treatment/Interventions Functional transfer training;LE strengthening/ROM; Therapeutic exercise; Endurance training;Patient/family training;Equipment eval/education; Bed mobility   Progress Progressing toward goals   Recommendation   Recommendation Home with family support;24 hour supervision/assist;Home PT   Equipment Recommended Wheelchair   PT Therapy Minutes   PT Time In 1100   PT Time Out 1130   PT Total Time (minutes) 30   PT Mode of treatment - Individual (minutes) 30   PT Mode of treatment - Concurrent (minutes) 0   PT Mode of treatment - Group (minutes) 0   PT Mode of treatment - Co-treat (minutes) 0   PT Mode of Teatment - Total time(minutes) 30 minutes   Therapy Time missed   Time missed?  No

## 2017-11-15 NOTE — PROGRESS NOTES
11/15/17 1230   Pain Assessment   Pain Assessment 0-10   Pain Score 4   Pain Type Acute pain;Chronic pain   Pain Location Generalized   Hospital Pain Intervention(s) Medication (See MAR); Repositioned   Response to Interventions pt reports pain is improved from AM session   Restrictions/Precautions   Precautions Fall Risk;Limb alert   Cognition   Arousal/Participation Alert; Cooperative   Subjective   Subjective Pt reports she wants to use the bike again because she "has control over L knee"   QI: Roll Left and Right   Assistance Needed Supervision   Assistance Provided by [place] No physical assistance   Roll Left and Right CARE Score 4   QI: Sit to [address] Provided by [place] No physical assistance   Sit to Lying CARE Score 4   QI: Lying to Sitting on Side of Bed   Assistance Needed Supervision   Assistance Provided by [place] No physical assistance   Lying to Sitting on Side of Bed CARE Score 4   QI: Chair/Bed-to-Chair Transfer   Assistance Needed Incidental touching   Assistance Provided by [place] No physical assistance   Chair/Bed-to-Chair Transfer CARE Score 4   Transfer Bed/Chair/Wheelchair   Limitations Noted In Balance; Coordination; Endurance;LE Strength   Adaptive Equipment None   Sit Pivot Contact Guard   Supine to Sit Supervision   Sit to Supine Supervision   Bed, Chair, Wheelchair Transfer (FIM) 4 - Patient requires steadying assist or light touching   Wheelchair mobility   QI: Does the patient use a wheelchair? 1  Yes   QI: Indicate the type of wheelchair 1  Manual   Wheelchair Assist Level Supervision   Method Right upper extremity; Left upper extremity   Needs Assist With Remove Leg Rest;Replace Leg Rest;Remove armrests;Replace armrests   Distance Level Surface (feet) 25 ft   Findings pt limited by LUE pain    Wheelchair (FIM) 1 - Patient wheels less than 49 feet regardless of assist/set up   Therapeutic Interventions   Flexibility supine on mat: HS, gastroc, gluts, piriformis, IT & TFL stretch, hip flexor (in sidelying) stretch B/L   Equipment Use   LE Bike x 10 min  Assessment   Treatment Assessment PT session focused on LE stretching and transfers  Noted some hip flexor tightness B/L; no other limitations noted  Pt tolerated stretching well and reports feeling good afterwards  Pt also reports she feels good after using the bike; wants to purchase a portable floor unit for home  Cont to practice sit pivot transfers and pt can perform CG/CS however occ requires min A (during AM session)  Pt unable to propel w/c further than 25' today 2* c/o LUE pain; pt has difficulty using LE's to propel 2* dec coordination and sensation  Pt more engaged and talkative during this session  Pt will cont to benefit from skilled PT to further improve transfers and w/c mobility  Barriers to Discharge None   PT Barriers   Physical Impairment Decreased strength;Decreased endurance; Impaired balance;Decreased mobility; Decreased coordination;Pain;Decreased skin integrity   Functional Limitation Stair negotiation;Standing;Walking   Comment pt can D/C home @ w/c level and reside on 1st floor    Plan   Treatment/Interventions Functional transfer training;LE strengthening/ROM; Therapeutic exercise; Endurance training;Patient/family training;Equipment eval/education; Bed mobility   Progress Progressing toward goals   Recommendation   Recommendation Home with family support;24 hour supervision/assist;Home PT   Equipment Recommended Wheelchair   PT Therapy Minutes   PT Time In 1230   PT Time Out 1330   PT Total Time (minutes) 60   PT Mode of treatment - Individual (minutes) 60   PT Mode of treatment - Concurrent (minutes) 0   PT Mode of treatment - Group (minutes) 0   PT Mode of treatment - Co-treat (minutes) 0   PT Mode of Teatment - Total time(minutes) 60 minutes   Therapy Time missed   Time missed?  No

## 2017-11-15 NOTE — PROGRESS NOTES
Internal Medicine Progress Note  Patient: Fernando Talbert  Age/sex: 81 y o  female  Medical Record #: [id number]      ASSESSMENT/PLAN:  Fernando Talbert is seen and examined and mangement for following issues:    1  Cervical cord compression with myelopathy/Mass at [facility]: Likely related to metastatic disease  XRT completed to lumbar and cervical spine  Rad-Onc would like to start tapering Decadron  Continue  Pain control  Therapy per primary team    2  Metastatic breast cancer: patient with moderate left pleural effusion, multiple hepatic lesions, possible adrenal mass and multiple bony lesions  Overall prognosis poor  Outpatient follow up with primary oncologist  Previously on Xeloda  3  Left pleural effusion: likely malignant  Monitor respiratory status  Currently denies complaints of SOB  4  Hypertension: continue norvasc and monitor blood pressure  Well controlled  5  Hypothyroidism: continue levothyroxine  6  Peripheral neuropathy: continue Lyrica  7  DVT prophylaxis: SQ heparin  8  Hyponatremia:  Stable at 133  Asymptomatic  Will monitor  9  Overactive bladder:  Detrol LA started  10  Chest pain: All VSS/ EKG reveals frequent PVCs otherwise WNL, Troponin negative x 3  Anxiolytic administered  ? Anxiety related, vs from bony mets? No further pain reported  11  LUE Edema: has component of lymphedema with an increase in edema over the weekend  Doppler negative  Continue compression garment                Subjective: Patient seen and examined  Pt reports feeling well today  Seen in PT   Denies CP/SOB/N/V     ROS:   GI: denies abdominal pain, change bowel habits or reflux symptoms  Neuro: No new neurologic changes  Respiratory: No Cough, SOB  Cardiovascular: No CP, palpitations     Scheduled Meds:    acetaminophen 975 mg Oral Q8H [facility]   amLODIPine 5 mg Oral Daily   dexamethasone 4 mg Oral Q8H [facility]   [START ON 11/19/2017] dexamethasone 4 mg Oral Q12H [facility]   heparin (porcine) 5,000 Units Subcutaneous Q8H [facility]   lactulose 20 g Oral BID   levothyroxine 100 mcg Oral Early Morning   lidocaine 1 patch Transdermal Daily   morphine 15 mg Oral BID   pantoprazole 40 mg Oral Early Morning   pregabalin 50 mg Oral TID   senna-docusate sodium 1 tablet Oral HS   tolterodine 4 mg Oral Daily       Labs:       Results from last 7 days  Lab Units 11/13/17  0616 11/09/17  0506   WBC Thousand/uL 10 59* 10 36*   HEMOGLOBIN g/dL 11 7 11 5   HEMATOCRIT % 34 6* 34 0*   PLATELETS Thousands/uL 191 206       Results from last 7 days  Lab Units 11/13/17  0616 11/09/17  0506   SODIUM mmol/L 133* 133*   POTASSIUM mmol/L 4 7 4 9   CHLORIDE mmol/L 101 100   CO2 mmol/L 25 27   BUN mg/dL 26* 27*   CREATININE mg/dL 0 37* 0 43*   GLUCOSE RANDOM mg/dL 100 92   CALCIUM mg/dL 7 4* 7 8*                  Glucose (mg/dL)   Date Value   11/13/2017 100   11/09/2017 92   11/06/2017 102   10/31/2017 101       Labs reviewed    Physical Examination:  Vitals:   Vitals:    11/14/17 1526 11/14/17 2329 11/15/17 0616 11/15/17 0726   BP: 106/76 108/71  113/67   Pulse: 88 84  88   Resp: 17 18  18   Temp: 97 5 °F (36 4 °C) (!) 97 3 °F (36 3 °C)  97 7 °F (36 5 °C)   TempSrc: Oral Oral  Oral   SpO2: 99% 99%  99%   Weight:   66 2 kg (145 lb 15 1 oz)    Height:           GEN: NAD, nontoxic  HEENT: NC/AT, EOMI  RESP: CTAB, no R/R/W  CV: +S1 S2, regular rate, no rubs, no chest wall tenderness  ABD: soft, NT, ND, normal BS   : No Singh  EXT: No edema bilateral LE, + mild edema of LUE, non pitting  Skin: No rashes  Neuro: AAOx3  Bilat pill rolling tremor  [ X ] Total time spent: 30 Mins and greater than 50% of this time was spent counseling/coordinating care        Bill Keen PA-C  Internal Medicine

## 2017-11-15 NOTE — PROGRESS NOTES
Pt alert and able to make needs known  In bed at this time  Call bell, phone and bedside table with belongings within reach  Pt voices no c/o's pain or discomfort at this time  Calls appropriately for the bed pan  Compression/lymphedema sleeve to LUE remains in place  Will continue to monitor

## 2017-11-15 NOTE — PROGRESS NOTES
11/15/17 0830   Pain Assessment   Pain Assessment (intermittent shooting pain down BLE subsided in minutes)   QI: Chair/Bed-to-Chair Transfer   Assistance Needed Physical assistance   Assistance Provided by [place] 50%-74%   Chair/Bed-to-Chair Transfer CARE Score 2   Transfer Bed/Chair/Wheelchair   Positioning Concerns Skin Integrity   Limitations Noted In Balance; Endurance   Adaptive Equipment None   Sit Pivot Moderate Assist   Bed, Chair, Wheelchair Transfer (FIM) 3 - [place] needs to lift, boost or assist to stand OR sit   Cognition   Overall Cognitive Status [facility]   Arousal/Participation Alert; Cooperative   Assessment   Treatment Assessment Pt  Participated in OT tx session focusing on BUE ROM/strengthening, energy conservation, leisure interests  Reviewed and finalized HEP for theraputty BUE  Pt  Limited by trigger finger 3rd digit R and L hand  Pt  Tolerated 2 sets 10 tabletop towel glides in 5 planes with cues to maintain unsupported position, engage core  Pt  Tolerated well with rest breaks and cues for form  Pt  Completed 1 set 5 cervical ROM and scapular ROM to increase ROM/stretch and improve posture/respiratory function  Encouraged pt  To utilize incentive spirometer to A with respiratory function  Pt  Receptive  Issued h/o and reviewed energy conservation techniques  Pt  Receptive  Inquired about leisure interests  Pt  Reports no interests but receptive to trying new activities  Pt  Receptive to puzzles  Pt  Engaged in puzzle and reports enjoying activity  Edu  Pt  Importance of integration of leisure activities to A with mood and quality of life  Pt  Receptive  To continue OT services per POC with a focus on stress management techniques, sleep hygiene, leisure interests, BUE ROM/strengthening, issued HEP BUE  Prognosis Fair   Problem List Decreased strength;Decreased range of motion;Decreased endurance; Impaired balance;Decreased mobility; Decreased safety awareness   Plan   Treatment/Interventions Functional transfer training;ADL retraining; Therapeutic exercise;Patient/family training;Equipment eval/education   Progress Progressing toward goals   Recommendation   OT Discharge Recommendation Home OT   OT Therapy Minutes   OT Time In 0830   OT Time Out 1000   OT Total Time (minutes) 90   OT Mode of treatment - Individual (minutes) 90   OT Mode of treatment - Concurrent (minutes) 0   OT Mode of treatment - Group (minutes) 0   OT Mode of treatment - Co-treat (minutes) 0   OT Mode of Teatment - Total time(minutes) 90 minutes   Therapy Time missed   Time missed?  No

## 2017-11-15 NOTE — PROGRESS NOTES
Per radiation oncology, steroid taper as follows:     4mg TID until 11/18, then   4mg BID until 11/22, then   4mg QAM/2mg QPM until 11/26, then   2mg BID until 11/30, then  2mg QAM/1mg QPM until 12/04, then  1mg BID until 12/08, then   1mg daily until 12/12      She will then start 1mg QOD on 12/4 with final dose on 12/18    Barbra Strickland MD MPH  Physical Medicine and Rehabilitation

## 2017-11-15 NOTE — SOCIAL WORK
ECIN referral to Marika Arellano for wheelchair with cushion to be delivered to room 457 prior to dc 11/17

## 2017-11-15 NOTE — PROGRESS NOTES
Physical Medicine and Rehabilitation Progress Note:  Spinal Cord Dysfunction:  Non-Traumatic:  04 130 Other Non-Traumatic Cord Compression  Kody Womack 81 y o  female MRN: [id number]  Unit/Bed#: -01 Encounter: [id number]    Assessment:   Mliton Scott is a 81 y o  female who  has a past medical history of Arthritis; Asthma; Breast cancer ([address] ); Disease of thyroid gland; and Hypertension  [facility] [address] after outpatient MRI demonstrated cord compression  Palliative treatment to spine started  Surgical intervention not recommended due to extent of metastasis  She was accepted to [facility] on 11/03/17  Subjective: Overnight, Kenzie reports difficulty with sleeping  Reports twinges pain in left lateral hip have increased since lyrica dose was decreased, and requested return to original dose  Deferring any sleep aids at this time  No other complaints  ROS: A 10-point ROS was performed  Negative except as listed above  Restrictions include: Fall precautions    Disposition: Discharge home 11/17    Plan:  # Cord compression, myelopathy  - Acute comprehensive interdisciplinary inpatient rehabilitation including PT, OT, SLP, RN, CM, SW, dietary, psychology, etc   - Appreciate Internal Medicine following - Dr Isauro Dove service      - secondary to spinal mets  - undergoing XRT (total of 10 sessions)  - Continue steroids    # Left lateral leg pain  - likely due to tight IT-band  - will instruct therapy to perform stretching    # Hyponatremia  - continue to monitor    Results from last 7 days  Lab Units 11/13/17  0616 11/09/17  0506   SODIUM mmol/L 133* 133*     # Mild Leukocytosis  - continue to monitor  - no fever or chills noted  - patient is having cough, states mucous is yellowish-green when she blows her nose  Continue mucinex to help thin out mucous        Results from last 7 days  Lab Units 11/13/17  0616 11/09/17  0506   WBC Thousand/uL 10 59* 10 36* # Chest pain (resolved)  - EKG performed, per IM team, demonstrates multiple PVCs  - Troponins negative x 3  - Continue to monitor for now      # Metastatic breast CA  - f/u with oncology as outpatient  - noted to have edema to LUE, will require compression garment     # Neuropathy  - continue lyrica (re-increased back to TID dosing)     # Anemia (improved)  - Likely reactive  - continue monitoring CBC       Results from last 7 days  Lab Units 11/13/17  0616 11/09/17  0506   HEMOGLOBIN g/dL 11 7 11 5     # HTN  - Continue Amlodipine     Temp:  [97 3 °F (36 3 °C)-97 7 °F (36 5 °C)] 97 7 °F (36 5 °C)  HR:  [84-88] 88  Resp:  [17-18] 18  BP: (106-113)/(67-76) 113/67    # Hypothyroid  - Continue levothyroxine      # Pain  - Continue tylenol PRN, for max of 3gm daily     - Continue oxycodone   - Continue MS Contin  - This patient record was searched in the [place] PDMP and the risks/benefits of prescribing this patient medications with high abuse/dependence potential was assessed and determined to be medically appropriate at this time  I could not find any prescriptions in [place], [place]  Patient reports she was getting oxycodone 10mg TID (chart review notes she was taking 10mg Q6h PRN), and MS contin 15mg BID  - Re-increase lyrica back to TID dosing     # Rehab Psych  - Continue alprazolam PRN  - Neuropsych consult, appreciate recs     # FENA/prophy  - Diet: cardiac   SLP and nutrition to monitor and adjust as necessary   - DVT prophy: Sequential compression device (Venodyne)  and Heparin  - GI ppx: Pantaprazole  - Bowel: colace , dulcolax suppository  and miralax PRN  - Nausea: Zofran  - Supplements: None  - Sleep: None      CODE: Level 1: Full Code    Objective:  Nursing staff reporting: no problems    Functional Update:  Physical Therapy Occupational Therapy   Weight Bearing Status: Weight Bearing as Tolerated  Transfers: Supervision (sit pivot only)  Bed Mobility: Supervision  Amulation Distance (ft): 0 feet  Ambulation: Supervision  Assistive Device for Ambulation:  (NA)  Wheelchair Mobility Distance: 20 ft  Wheelchair Mobility: Minimal Assistance, Moderate Assistance  Number of Stairs: 0 (not safe)  Discharge Recommendations: Home with:  DC Home with[de-identified] 24 Hour Assisteance, 24 Hour Supervision, Family Support, First Floor Setup, Home Physical Therapy   Eating: Supervision  Grooming: Supervision  Bathing: Minimal Assistance  Bathing: Minimal Assistance  Upper Body Dressing: Supervision  Lower Body Dressing: Maximum Assistance  Toileting: Maximum Assistance  Tub/Shower Transfer: Moderate Assistance (shower sit pivot to tub bench)  Toilet Transfer: Moderate Assistance  Cognition: Exceptions to WNL  Cognition: Decreased Memory, Decreased Attention  Orientation: Person, Place, Time, Situation       Allergies and Medications per EMR    Physical Exam:  General: alert, no apparent distress, cooperative and comfortable  HENMT: Head: Normal, normocephalic, atraumatic  Eye: Normal external eye, conjunctiva, lids   Ears: Normal external ears    Nose: Normal external nose, mucus membranes  Pulmonary: chest expansion normal, no retractions, no accessory muscle usage  Abdomen: soft, nontender, nondistended, no masses or organomegaly  Skin/Extremity: no rashes, no erythema, LUE edema (which patient states is her baseline)  Neurologic: Awake alert orientedx3  Psych: normal mood, behavior, speech, dress, and thought processes    Diagnostic Studies:  Reviewed, no new imaging    Vitals: Reviewed   Temp:  [97 3 °F (36 3 °C)-97 7 °F (36 5 °C)] 97 7 °F (36 5 °C)  HR:  [84-88] 88  Resp:  [17-18] 18  BP: (106-113)/(67-76) 113/67   Intake/Output Summary (Last 24 hours) at 11/15/17 1038  Last data filed at 11/15/17 0957   Gross per 24 hour   Intake              415 ml   Output              600 ml   Net             -185 ml        Laboratory: Reviewed  Lab Results   Component Value Date    HGB 11 7 11/13/2017    HCT 34 6 (L) 11/13/2017    WBC 10 59 (H) 11/13/2017     Lab Results   Component Value Date    BUN 26 (H) 11/13/2017     (L) 11/13/2017    K 4 7 11/13/2017     11/13/2017    GLUCOSE 100 11/13/2017    CREATININE 0 37 (L) 11/13/2017     No results found for: PROTIME, INR     Patient Active Problem List   Diagnosis    Cervical cord compression with myelopathy (HCC)    Breast [facility] [address] [address]    Essential hypertension    Hypothyroidism       ** Please Note: Fluency Direct voice to text software may have been used in the creation of this document  **    [ x ] Total time spent: 30 Mins, and greater than 50% of this time was spent counseling/coordinating care

## 2017-11-16 LAB
ANION GAP SERPL CALCULATED.3IONS-SCNC: 7 MMOL/L (ref 4–13)
ANISOCYTOSIS BLD QL SMEAR: PRESENT
BASOPHILS # BLD MANUAL: 0 THOUSAND/UL (ref 0–0.1)
BASOPHILS NFR MAR MANUAL: 0 % (ref 0–1)
BUN SERPL-MCNC: 22 MG/DL (ref 5–25)
CALCIUM SERPL-MCNC: 7.3 MG/DL (ref 8.3–10.1)
CHLORIDE SERPL-SCNC: 102 MMOL/L (ref 100–108)
CO2 SERPL-SCNC: 25 MMOL/L (ref 21–32)
CREAT SERPL-MCNC: 0.4 MG/DL (ref 0.6–1.3)
EOSINOPHIL # BLD MANUAL: 0 THOUSAND/UL (ref 0–0.4)
EOSINOPHIL NFR BLD MANUAL: 0 % (ref 0–6)
ERYTHROCYTE [DISTWIDTH] IN BLOOD BY AUTOMATED COUNT: 15.5 % (ref 11.6–15.1)
GFR SERPL CREATININE-BSD FRML MDRD: 122 ML/MIN/1.73SQ M
GLUCOSE SERPL-MCNC: 98 MG/DL (ref 65–140)
HCT VFR BLD AUTO: 33.7 % (ref 34.8–46.1)
HGB BLD-MCNC: 11.4 G/DL (ref 11.5–15.4)
LYMPHOCYTES # BLD AUTO: 0 % (ref 14–44)
LYMPHOCYTES # BLD AUTO: 0 THOUSAND/UL (ref 0.6–4.47)
MCH RBC QN AUTO: 28.4 PG (ref 26.8–34.3)
MCHC RBC AUTO-ENTMCNC: 33.8 G/DL (ref 31.4–37.4)
MCV RBC AUTO: 84 FL (ref 82–98)
MONOCYTES # BLD AUTO: 0.44 THOUSAND/UL (ref 0–1.22)
MONOCYTES NFR BLD: 5 % (ref 4–12)
NEUTROPHILS # BLD MANUAL: 8.41 THOUSAND/UL (ref 1.85–7.62)
NEUTS SEG NFR BLD AUTO: 95 % (ref 43–75)
NRBC BLD AUTO-RTO: 0 /100 WBCS
OVALOCYTES BLD QL SMEAR: PRESENT
PLATELET # BLD AUTO: 150 THOUSANDS/UL (ref 149–390)
PLATELET BLD QL SMEAR: ADEQUATE
PMV BLD AUTO: 9.1 FL (ref 8.9–12.7)
POIKILOCYTOSIS BLD QL SMEAR: PRESENT
POTASSIUM SERPL-SCNC: 4.4 MMOL/L (ref 3.5–5.3)
RBC # BLD AUTO: 4.02 MILLION/UL (ref 3.81–5.12)
RBC MORPH BLD: PRESENT
SODIUM SERPL-SCNC: 134 MMOL/L (ref 136–145)
WBC # BLD AUTO: 8.85 THOUSAND/UL (ref 4.31–10.16)

## 2017-11-16 PROCEDURE — 97530 THERAPEUTIC ACTIVITIES: CPT | Performed by: PHYSICAL THERAPIST

## 2017-11-16 PROCEDURE — 80048 BASIC METABOLIC PNL TOTAL CA: CPT | Performed by: PHYSICIAN ASSISTANT

## 2017-11-16 PROCEDURE — 97110 THERAPEUTIC EXERCISES: CPT

## 2017-11-16 PROCEDURE — 97535 SELF CARE MNGMENT TRAINING: CPT

## 2017-11-16 PROCEDURE — 97110 THERAPEUTIC EXERCISES: CPT | Performed by: PHYSICAL THERAPIST

## 2017-11-16 PROCEDURE — 85007 BL SMEAR W/DIFF WBC COUNT: CPT | Performed by: PHYSICIAN ASSISTANT

## 2017-11-16 PROCEDURE — 85027 COMPLETE CBC AUTOMATED: CPT | Performed by: PHYSICIAN ASSISTANT

## 2017-11-16 RX ORDER — ALPRAZOLAM 0.25 MG/1
0.25 TABLET ORAL 2 TIMES DAILY PRN
Qty: 30 TABLET | Refills: 0 | Status: SHIPPED | OUTPATIENT
Start: 2017-11-16 | End: 2017-11-26

## 2017-11-16 RX ORDER — PANTOPRAZOLE SODIUM 40 MG/1
40 TABLET, DELAYED RELEASE ORAL
Qty: 30 TABLET | Refills: 0 | Status: SHIPPED | OUTPATIENT
Start: 2017-11-17

## 2017-11-16 RX ORDER — TOLTERODINE 4 MG/1
4 CAPSULE, EXTENDED RELEASE ORAL DAILY
Qty: 30 CAPSULE | Refills: 0 | Status: SHIPPED | OUTPATIENT
Start: 2017-11-17

## 2017-11-16 RX ORDER — LACTULOSE 20 G/30ML
20 SOLUTION ORAL 2 TIMES DAILY
Qty: 1800 ML | Refills: 0 | Status: SHIPPED | OUTPATIENT
Start: 2017-11-16 | End: 2017-12-22 | Stop reason: HOSPADM

## 2017-11-16 RX ORDER — DEXAMETHASONE 2 MG/1
TABLET ORAL
Qty: 60 TABLET | Refills: 0 | Status: SHIPPED | OUTPATIENT
Start: 2017-11-16

## 2017-11-16 RX ADMIN — PREGABALIN 50 MG: 50 CAPSULE ORAL at 09:17

## 2017-11-16 RX ADMIN — MORPHINE SULFATE 15 MG: 15 TABLET, EXTENDED RELEASE ORAL at 09:16

## 2017-11-16 RX ADMIN — OXYCODONE HYDROCHLORIDE 10 MG: 10 TABLET ORAL at 05:52

## 2017-11-16 RX ADMIN — LIDOCAINE 1 PATCH: 50 PATCH CUTANEOUS at 09:21

## 2017-11-16 RX ADMIN — ALPRAZOLAM 0.25 MG: 0.25 TABLET ORAL at 11:24

## 2017-11-16 RX ADMIN — MORPHINE SULFATE 15 MG: 15 TABLET, EXTENDED RELEASE ORAL at 17:16

## 2017-11-16 RX ADMIN — DEXAMETHASONE 4 MG: 4 TABLET ORAL at 05:52

## 2017-11-16 RX ADMIN — PREGABALIN 50 MG: 50 CAPSULE ORAL at 21:58

## 2017-11-16 RX ADMIN — ACETAMINOPHEN 975 MG: 325 TABLET, FILM COATED ORAL at 14:54

## 2017-11-16 RX ADMIN — TOLTERODINE TARTRATE 4 MG: 4 CAPSULE, EXTENDED RELEASE ORAL at 09:22

## 2017-11-16 RX ADMIN — PANTOPRAZOLE SODIUM 40 MG: 40 TABLET, DELAYED RELEASE ORAL at 05:52

## 2017-11-16 RX ADMIN — HEPARIN SODIUM 5000 UNITS: 5000 INJECTION, SOLUTION INTRAVENOUS; SUBCUTANEOUS at 14:53

## 2017-11-16 RX ADMIN — ACETAMINOPHEN 975 MG: 325 TABLET, FILM COATED ORAL at 05:52

## 2017-11-16 RX ADMIN — OXYCODONE HYDROCHLORIDE 10 MG: 10 TABLET ORAL at 14:53

## 2017-11-16 RX ADMIN — HEPARIN SODIUM 5000 UNITS: 5000 INJECTION, SOLUTION INTRAVENOUS; SUBCUTANEOUS at 21:59

## 2017-11-16 RX ADMIN — ALPRAZOLAM 0.25 MG: 0.25 TABLET ORAL at 21:59

## 2017-11-16 RX ADMIN — ACETAMINOPHEN 975 MG: 325 TABLET, FILM COATED ORAL at 22:00

## 2017-11-16 RX ADMIN — PREGABALIN 50 MG: 50 CAPSULE ORAL at 17:16

## 2017-11-16 RX ADMIN — LEVOTHYROXINE SODIUM 100 MCG: 100 TABLET ORAL at 05:52

## 2017-11-16 RX ADMIN — DEXAMETHASONE 4 MG: 4 TABLET ORAL at 14:53

## 2017-11-16 RX ADMIN — HEPARIN SODIUM 5000 UNITS: 5000 INJECTION, SOLUTION INTRAVENOUS; SUBCUTANEOUS at 05:52

## 2017-11-16 RX ADMIN — Medication 1 TABLET: at 21:59

## 2017-11-16 RX ADMIN — AMLODIPINE BESYLATE 5 MG: 5 TABLET ORAL at 09:17

## 2017-11-16 RX ADMIN — DEXAMETHASONE 4 MG: 4 TABLET ORAL at 21:59

## 2017-11-16 NOTE — PROGRESS NOTES
Progress Note - Neuropsychology/Psychology   Marvel Maurice 81 y o  female MRN: [id number]    Unit/Bed#: -01 Encounter: [id number]      Subjective:   Pt presented in worried mood, constricted affect  Discussed pt upcoming discharge and future treatment  Reviewed coping skills including mindfulness to help pt manage mood  Provided supportive counseling  Assessment:  Pt has difficulties at times managing anxiety about her future and unknown course of treatment; these feelings are appropriate given pt diagnosis  Dx: F43 23 Adjustment disorder with depressed and anxious mood  Code: 82104    Plan:  Continue individual therapy sessions while pt is at [facility]; pt was encouraged to utilize coping skills after discharge  Objective:     Vitals: Blood pressure 115/75, pulse 89, temperature 97 5 °F (36 4 °C), temperature source Oral, resp  rate 18, height 5' 2" (1 575 m), weight 66 2 kg (145 lb 15 1 oz), SpO2 99 %  ,Body mass index is 26 69 kg/m²        Intake/Output Summary (Last 24 hours) at 11/16/17 0966  Last data filed at 11/16/17 0835   Gross per 24 hour   Intake              880 ml   Output                0 ml   Net              880 ml       Invasive Devices          No matching active lines, drains, or airways

## 2017-11-16 NOTE — PROGRESS NOTES
Sarai, [address] [facility], reports pt indicates she has some R side chest pain during therapy which she rates 5:10  Vitals 121/82 HR 71 irregular 02 100%  Pt indicates she hasn't had pain there before and does not think she did any exercise to cause it  Pt does report some anxiety for which she is requesting PRN Xanax  Will notify attending physician  Will continue to monitor

## 2017-11-16 NOTE — PROGRESS NOTES
Pt c/o increased "stabbing" type pain L leg  Was recently medicated  Repositioned  Leg elevated  Pt refuses pillow under hip or under feet at this time  Again reviewed importance of off loading for skin integrity  Will try again when pt more comfortable

## 2017-11-16 NOTE — PROGRESS NOTES
11/16/17 1230   Pain Assessment   Pain Assessment 0-10   Pain Score 4   Pain Type Chronic pain   Pain Location Generalized   Hospital Pain Intervention(s) Medication (See MAR); Rest   Response to Interventions pt back to bed @ end of session to rest   Restrictions/Precautions   Precautions Fall Risk;Limb alert   Cognition   Arousal/Participation Alert; Cooperative   Subjective   Subjective Pt reports feeling fatigued @ end of session   QI: Roll Left and Right   Assistance Needed Independent   Assistance Provided by [place] No physical assistance   Roll Left and Right CARE Score 6   QI: Sit to [address] Provided by [place] No physical assistance   Sit to Lying CARE Score 4   QI: Sit to Stand   Assistance Needed Physical assistance   Assistance Provided by [place] 25%-49%   Sit to Stand CARE Score 3   QI: Chair/Bed-to-Chair Transfer   Assistance Needed Physical assistance   Assistance Provided by [place] Less than 25%   Chair/Bed-to-Chair Transfer CARE Score 3   Transfer Bed/Chair/Wheelchair   Limitations Noted In Balance; Coordination; Endurance;LE Strength   Adaptive Equipment Roller Walker   Sit Pivot Contact Guard;Minimal Assist   Sit to Stand Minimal Assist   Stand to Sit Minimal Assist   Sit to Supine Supervision   Car Transfer Minimal Assist   Bed, Chair, Wheelchair Transfer (FIM) 2 - [place] needs to lift or boost to rise AND assist to sit   QI: Car Transfer   Assistance Needed Physical assistance   Assistance Provided by [place] 25%-49%   Car Transfer CARE Score 3   QI: Wheel 50 Feet with Two Turns   Reason if not Attempted Refused to perform   Wheel 50 Feet with Two Turns CARE Score 7   QI: Wheel 150 Feet   Reason if not Attempted Refused to perform   Wheel 150 Feet CARE Score 7   Wheelchair mobility   QI: Does the patient use a wheelchair? 1  Yes   QI: Indicate the type of wheelchair 1   Manual   Findings pt declined to practice 2* LUE pain    Assessment   Treatment Assessment Pt participated in [facility] PT session with focus on car transfer  Pt reports her son's car is about the same height as car on [address]  Practiced 2x back and forth to ensure pt is comfortable with the transfer  Pt performs sit pivot w/c <> car @ min A level; needs vc's for setting up transfer  Reviewed D/C recommendations with pt; all questions/concerns addressed  Pt wanted to go back to bed @ end of session due to fatigue  Pt will cont to benefit from home PT services to maximize safety with transfers and all mobility  Problem List Decreased strength;Decreased endurance; Impaired balance;Decreased mobility; Decreased coordination;Pain   Barriers to Discharge None   PT Barriers   Functional Limitation Stair negotiation;Standing;Walking   Comment pt can D/C home @ w/c level and reside on 1st floor   Plan   Treatment/Interventions Functional transfer training;LE strengthening/ROM; Therapeutic exercise; Endurance training;Patient/family training;Equipment eval/education; Bed mobility   Progress Progressing toward goals   Recommendation   Recommendation Home with family support;24 hour supervision/assist;Home PT   Equipment Recommended Wheelchair   PT Therapy Minutes   PT Time In 1230   PT Time Out 1300   PT Total Time (minutes) 30   PT Mode of treatment - Individual (minutes) 30   PT Mode of treatment - Concurrent (minutes) 0   PT Mode of treatment - Group (minutes) 0   PT Mode of treatment - Co-treat (minutes) 0   PT Mode of Teatment - Total time(minutes) 30 minutes   Therapy Time missed   Time missed?  No

## 2017-11-16 NOTE — PROGRESS NOTES
11/16/17 1030   Pain Assessment   Pain Assessment 0-10   Pain Score 4   Pain Type Acute pain;Chronic pain   Pain Location Generalized   Hospital Pain Intervention(s) Medication (See MAR); Rest;Repositioned   Response to Interventions pt reports pain 4/10 is lowest her pain goes and it is tolerable   Restrictions/Precautions   Precautions Fall Risk;Limb alert   Cognition   Arousal/Participation Alert; Cooperative   Subjective   Subjective Pt c/o chest "tightness" during session; SHARAD Morales notified    QI: Sit to [address] Provided by [place] No physical assistance   Sit to Lying CARE Score 4   QI: Lying to Sitting on Side of Bed   Assistance Needed Physical assistance   Assistance Provided by [place] 25%-49%   Lying to Sitting on Side of Bed CARE Score 3   QI: Chair/Bed-to-Chair Transfer   Assistance Needed Incidental touching   Assistance Provided by [place] No physical assistance   Chair/Bed-to-Chair Transfer CARE Score 4   Transfer Bed/Chair/Wheelchair   Limitations Noted In Balance; Coordination; Endurance;LE Strength   Adaptive Equipment None   Sit Pivot Contact Guard   Supine to Sit Minimal Assist   Sit to Supine Supervision   Bed, Chair, Wheelchair Transfer (FIM) 4 - [place] lifts one extremity during transfer   QI: Wheel 50 Feet with Two Turns   Reason if not Attempted Refused to perform   Wheel 50 Feet with Two Turns CARE Score 7   QI: Wheel 150 Feet   Reason if not Attempted Refused to perform   Wheel 150 Feet CARE Score 7   Wheelchair mobility   QI: Does the patient use a wheelchair? 1  Yes   QI: Indicate the type of wheelchair 1  Manual   Findings pt declined practicing w/c mobility 2* LUE pain   Therapeutic Interventions   Strengthening supine on mat: hooklying ball squeezes, SAQ, R heel slides, R hip abduction   Other practiced sit pivots w/c <> mat 6x   Equipment Use   LE Bike x 15 min     Assessment   Treatment Assessment Pt c/o chest "tightness" and "squeezing" while performing SAQ on mat table; notified SHARAD Gramajo who came to assess pt immediately  Pt's vitals were /82, HR 76, SpO2 100%  Pt rated chest discomfort as 5/10  SHARAD Gramajo gave pt xanax and pt reports she is feeling anxious about her dr Silas Aguirre Tuesday  After a period of rest, the chest discomfort passed and pt was able to continue to practice transfers to conclude PT session  Instructed pt to inform staff member if chest discomfort becomes worse  Provided manual assist during heel slides, hip abd, SAQ on the R to perform smooth, controlled motion  Pt was consistent in performing sit pivots @ CG level today  Will focus on car transfer during PM session  Pt will cont to benefit from skilled PT to practice car transfers and w/c mobility to ensure safe D/C home  Problem List Decreased strength;Decreased endurance; Impaired balance;Decreased mobility; Decreased coordination;Pain   Barriers to Discharge None   PT Barriers   Functional Limitation Stair negotiation;Standing;Walking   Comment pt can D/C home @ w/c level and reside on 1st floor    Plan   Treatment/Interventions Functional transfer training;LE strengthening/ROM; Therapeutic exercise; Endurance training;Patient/family training;Equipment eval/education; Bed mobility   Progress Progressing toward goals   Recommendation   Recommendation Home with family support;24 hour supervision/assist;Home PT   Equipment Recommended Wheelchair   PT Therapy Minutes   PT Time In 1030   PT Time Out 1130   PT Total Time (minutes) 60   PT Mode of treatment - Individual (minutes) 25   PT Mode of treatment - Concurrent (minutes) 35   PT Mode of treatment - Group (minutes) 0   PT Mode of treatment - Co-treat (minutes) 0   PT Mode of Teatment - Total time(minutes) 60 minutes   Therapy Time missed   Time missed?  No

## 2017-11-16 NOTE — DISCHARGE INSTRUCTIONS
Please note you are restricted from driving/operating a motorized vehicle/operating heavy machinery/etc until you are cleared through a formal driving evaluation  This service is offered through [facility] program on [address] however this evaluation can be done at a site of your choosing  Please contact your family doctor for a referral when your family doctor clears you to perform this evaluation once your neurologic/physical deficits have stabilized       Please see your doctors listed in the follow up providers section of your discharge paperwork, and take the discharge paperwork with you to your appointments    Please note changes may have been made to your medications please refer to your discharge paperwork for your current medications and take this list with you to all your doctors appointments for your doctors to review    Please do not resume a home medication unless the medication reconciliation sheet indicates to do so, please do not assume that a medication that you were given a prescription for is the same as a medication you have at home based on both medications having the same name as dosages and frequency may have changed    Please do not combine pain medications (including but not limited to tramadol, vicodin, percocet, oxycodone, oxycontin, morphine, hydropmorphone, oxymorphone, fentanyl, hydrocodone, etc) with muscle relaxants (including but not limited to zanaflex, flexaril, soma, robaxin, etc) or sedatives/sleep aids/anti-anxiety medications (including but not limited to [place], valium, xanax, klonipin, ativan, etc) that you may have been prescribed prior to admission    It is advisable to limit the use of pain medications (including but not limited to tramadol, vicodin, percocet, oxycodone, oxycontin, morphine, hydropmorphone, oxymorphone, fentanyl, hydrocodone, etc) and avoid sedatives/sleep aids/anti-anxiety medications (including but not limited to [place], valium, xanax, klonipin, ativan, etc), muscle relaxants (including but not limited to zanaflex, flexaril, soma, robaxin, etc) as they may become habit forming  Please do not drive or operate heavy machinery while using these medications  Do not drink alcohol while using these medications    Please check your blood pressure prior to taking your blood pressure medications and 1 hour after, please contact your family doctor immediately for a blood pressure below 100/60 and do not take your blood pressure medications until speaking with them, please contact your family doctor immediately for blood pressure greater than 160/100    Please note the following incidental findings were found during your recent hospitalization please discuss them with your doctors so that they may arrange any tests/referrals as they deem necessary:   · Chest pain - you had a brief episode of chest pain for which workup was completed  Please followup with your family physician  · Overactive bladder - you were started on Detrol  Please follow-up with your family physician  · Left pleural effusion - you were found to have fluid surrounding your lungs, however you did not demonstrate significant shortness of breath requiring supplemental oxygen  Please follow-up with your family physician       Unless specifically noted in your medication list provided to you in your discharge paper work, please discuss with your family doctor prior to resuming any vitamins/supplements you may have been taking prior to your hospitalization     Please note a summary of your hospital stay with relevant information for your doctors has been sent to them, please confirm with your doctors at your follow up visits that they have received this summary and have them contact [facility] if they have not received them along with any other medical records they may require

## 2017-11-16 NOTE — PROGRESS NOTES
Internal Medicine Progress Note  Patient: Karen Handy  Age/sex: 81 y o  female  Medical Record #: [id number]      ASSESSMENT/PLAN:  Karen Handy is seen and examined and mangement for following issues:    1  Cervical cord compression with myelopathy/Mass at [facility]: Likely related to metastatic disease  XRT completed to lumbar and cervical spine  Continue Decadron taper per Rad-Onc  Continue  Pain control  Therapy per primary team    2  Metastatic breast cancer: patient with moderate left pleural effusion, multiple hepatic lesions, possible adrenal mass and multiple bony lesions  Overall prognosis poor  Outpatient follow up with primary oncologist  Previously on Xeloda  3  Left pleural effusion: likely malignant  Monitor respiratory status  Currently denies complaints of SOB  4  Hypertension: continue norvasc and monitor blood pressure  Well controlled  5  Hypothyroidism: continue levothyroxine  6  Peripheral neuropathy: continue Lyrica  7  DVT prophylaxis: SQ heparin  8  Hyponatremia:  Stable at 134  Asymptomatic  Will monitor  9  Overactive bladder:  Detrol LA started  10  Chest pain: All VSS/ EKG reveals frequent PVCs otherwise WNL, Troponin negative x 3  Anxiolytic administered  ? Anxiety related, vs from bony mets? No further pain reported  11  LUE Edema: has component of lymphedema with an increase in edema over the weekend  Doppler negative  Continue compression garment                Subjective: Patient seen and examined  Pt reports feeling well today  Reported PT session went well  She felt anxious earlier with some chest tightness  This resolved after she took a xanax   She reports improvement in LLE pain with increase in her Lyrica to her home dose     ROS:   GI: denies abdominal pain, change bowel habits or reflux symptoms  Neuro: No new neurologic changes  Respiratory: No Cough, SOB  Cardiovascular: No CP, palpitations     Scheduled Meds:    acetaminophen 975 mg Oral Q8H [address]   amLODIPine 5 mg Oral Daily   dexamethasone 4 mg Oral Q8H [address]   [START ON 11/19/2017] dexamethasone 4 mg Oral Q12H [address]   heparin (porcine) 5,000 Units Subcutaneous Q8H [address]   lactulose 20 g Oral BID   levothyroxine 100 mcg Oral Early Morning   lidocaine 1 patch Transdermal Daily   morphine 15 mg Oral BID   pantoprazole 40 mg Oral Early Morning   pregabalin 50 mg Oral TID   senna-docusate sodium 1 tablet Oral HS   tolterodine 4 mg Oral Daily       Labs:       Results from last 7 days  Lab Units 11/16/17  0643 11/13/17  0616   WBC Thousand/uL 8 85 10 59*   HEMOGLOBIN g/dL 11 4* 11 7   HEMATOCRIT % 33 7* 34 6*   PLATELETS Thousands/uL 150 191       Results from last 7 days  Lab Units 11/16/17  0643 11/13/17  0616   SODIUM mmol/L 134* 133*   POTASSIUM mmol/L 4 4 4 7   CHLORIDE mmol/L 102 101   CO2 mmol/L 25 25   BUN mg/dL 22 26*   CREATININE mg/dL 0 40* 0 37*   GLUCOSE RANDOM mg/dL 98 100   CALCIUM mg/dL 7 3* 7 4*                  Glucose (mg/dL)   Date Value   11/16/2017 98   11/13/2017 100   11/09/2017 92   11/06/2017 102       Labs reviewed    Physical Examination:  Vitals:   Vitals:    11/15/17 0726 11/15/17 1500 11/15/17 2245 11/16/17 0712   BP: 113/67 93/57 109/69 115/75   Pulse: 88 64 80 89   Resp: 18 20 20 18   Temp: 97 7 °F (36 5 °C) 97 5 °F (36 4 °C) 97 9 °F (36 6 °C) 97 5 °F (36 4 °C)   TempSrc: Oral Oral Oral Oral   SpO2: 99% 99% 99% 99%   Weight:       Height:           GEN: NAD, nontoxic  HEENT: NC/AT, EOMI  RESP: CTAB, no R/R/W  CV: +S1 S2, regular rate, no rubs, no chest wall tenderness  ABD: soft, NT, ND, normal BS   : No Singh  EXT: No edema bilateral LE, + mild edema of LUE, non pitting  Skin: No rashes  Neuro: AAOx3  Bilat pill rolling tremor  [ X ] Total time spent: 30 Mins and greater than 50% of this time was spent counseling/coordinating care        Judah Cash PA-C  Internal Medicine

## 2017-11-16 NOTE — PROGRESS NOTES
11/16/17 5346   Pain Assessment   Pain Assessment 0-10   Pain Score 4   Pain Type Chronic pain   Pain Location Generalized   Pain Orientation Left   Restrictions/Precautions   Precautions Fall Risk;Limb alert   Weight Bearing Restrictions No   ROM Restrictions No   QI: Oral Hygiene   Assistance Needed Set-up / clean-up   Assistance Provided by [place]nel[place] No physical assistance   Comment seated at sink   Oral Hygiene CARE Score 5   Grooming   Able To Initiate Tasks; Wash/Dry Face;Wash/Dry Hands;Brush/Clean Teeth;Comb/Brush Hair   Limitation Noted In Safety; Sequencing;Strength;Timeliness   Grooming (FIM) 5 - Connelly sets up supplies or applies device   QI: Shower/Bathe Self   Assistance Needed Physical assistance   Assistance Provided by Connelly 50%-74%   Shower/Bathe Self CARE Score 2   Bathing   Assessed Bath Style Sponge Bath   Anticipated D/C Bath Style Sponge Bath   Able to Gather/Transport No   Able to [employer] Temperature No   Able to Wash/Rinse/Dry (body part) Left Arm;Right Arm;L Upper Leg;R Upper Leg;Chest;Abdomen;Perineal Area   Limitations Noted in Balance; Endurance;Strength; Safety   Positioning Seated;Standing   Findings  Pt continues to be limited with LB dressing 2* to decreased sitting/standing balance, core strength, and dynamic reach below waist     Bathing (FIM) 3 - Patient completes 5/10  6/10 or 7/10 parts   Tub/Shower Transfer   Not Assessed Patient refusal   Tub Transfer (FIM) 0 - Activity does not occur   Shower Transfer (FIM) 0 - Activity does not occur   QI: Upper Body Dressing   Assistance Needed Set-up / clean-up;Supervision   Assistance Provided by Connelly No physical assistance   Upper Body Dressing CARE Score 4   QI: Lower Body Dressing   Assistance Needed Physical assistance   Assistance Provided by Connelly 75% or more   Lower Body Dressing CARE Score 2   QI: Putting On/Taking Off Footwear   Assistance Needed Physical assistance   Assistance Provided by Connelly Total assistance   Putting On/Taking Off Footwear CARE Score 1   Dressing/Undressing Clothing   Remove UB Clothes (gown)   Don UB Clothes Pullover Shirt   Don LB Clothes Pants; Undergarment;Socks; Shoes   Limitations Noted In Balance; Coordination; Endurance;Strength   Positioning Supported Sit;Standing   LB Dressing (FIM) 1 - Patient completes less than 25% of all tasks   QI: Sit to Stand   Assistance Needed Physical assistance   Assistance Provided by [place] 25%-49%   Sit to Stand CARE Score 3   QI: Chair/Bed-to-Chair Transfer   Assistance Needed Physical assistance   Assistance Provided by [place] 25%-49%   Chair/Bed-to-Chair Transfer CARE Score 3   Transfer Bed/Chair/Wheelchair   Limitations Noted In Balance; Endurance   Sit Pivot Moderate Assist   Bed, Chair, Wheelchair Transfer (FIM) 3 - Patient completes 50 - 74% of all tasks   Exercise Tools   Other Exercise Tool 1 Pt completes bead retrieval from read medium soft theraputty to increase [facility] and strength for increased independence with ADL tasks  Other Exercise Tool 2 Pt completes  strengthening exercises with use of blue block 3 x 10 each hand to increase UB strength and coordination  Cognition   Overall Cognitive Status WFL   Arousal/Participation Alert; Cooperative   Attention Attends with cues to redirect   Orientation Level Oriented X4   Memory Decreased short term memory;Decreased recall of recent events;Decreased recall of precautions   Following Commands Follows one step commands with increased time or repetition   Activity Tolerance   Activity Tolerance Patient tolerated treatment well   Assessment   Treatment Assessment Pt participated in skilled OT services with focus on ADL retraining, functional transfers, [facility] and strength  Pt continues to be limited by LUE weakness and decreased coordination and decreased BLE strength which limits pts ability to safely and independently complete ADL tasks   Pt continues to require total A with LB dressing 2* to noted deficits which she reports her son will be able to provide upon d/c home  Pt demos improvement with UB dressing and incorporation of LUE during all functional tasks  Education initiated on stress management/relaxation techniques  Pt will benefit from continued education on stress management and sleep hygiene topics  Pt will continue to benefit from skilled OT services following POC to increase safety and independence with ADL tasks  Prognosis Fair   Problem List Decreased strength;Decreased range of motion;Decreased endurance; Impaired balance;Decreased mobility; Decreased safety awareness   Plan   Treatment/Interventions ADL retraining;Functional transfer training; Endurance training;Equipment eval/education; Compensatory technique education   Progress Progressing toward goals   Recommendation   OT Discharge Recommendation Home OT   OT Therapy Minutes   OT Time In 0830   OT Time Out 1000   OT Total Time (minutes) 90   OT Mode of treatment - Individual (minutes) 90   OT Mode of treatment - Concurrent (minutes) 0   OT Mode of treatment - Group (minutes) 0   OT Mode of treatment - Co-treat (minutes) 0   OT Mode of Teatment - Total time(minutes) 90 minutes   Therapy Time missed   Time missed?  No

## 2017-11-16 NOTE — PROGRESS NOTES
Physical Medicine and Rehabilitation Progress Note:  Spinal Cord Dysfunction:  Non-Traumatic:  04 130 Other Non-Traumatic Cord Compression  Osito Womack 81 y o  female MRN: [id number]  Unit/Bed#: -01 Encounter: [id number]    Assessment:   Kenny Jeans is a 81 y o  female who  has a past medical history of Arthritis; Asthma; Breast cancer ([address] ); Disease of thyroid gland; and Hypertension  and presented to the [address] after outpatient MRI demonstrated cord compression  Palliative treatment to spine started  Surgical intervention not recommended due to extent of metastasis  She was accepted to [facility] on 11/03/17  Subjective: No acute events overnight  Kali [place] was seen while in therapies  No complaints today  States that left hip pain has improved with increase in lyrica  ROS: A 10-point ROS was performed  Negative except as listed above  Restrictions include:  Fall precautions    Disposition: Discharge home 11/17    Plan:  # Cord compression, myelopathy  - Acute comprehensive interdisciplinary inpatient rehabilitation including PT, OT, SLP, RN, CM, SW, dietary, psychology, etc   - Appreciate Internal Medicine following - Dr Lambert Oseguera service      - secondary to spinal mets  - Completed XRT (total of 10 sessions)  - Continue steroids    # Left lateral leg pain (improving)  - likely due to tight IT-band  - will instruct therapy to perform stretching    # Hyponatremia  - continue to monitor    Results from last 7 days  Lab Units 11/16/17  0643 11/13/17  0616   SODIUM mmol/L 134* 133*     # Mild Leukocytosis (improved)  - continue to monitor  - no fever or chills noted  - continue mucinex for productive cough      Results from last 7 days  Lab Units 11/16/17  0643 11/13/17  0616   WBC Thousand/uL 8 85 10 59*     # Chest pain (resolved)  - EKG performed, per IM team, demonstrates multiple PVCs  - Troponins negative x 3  - Continue to monitor for now      # Metastatic breast CA  - f/u with oncology as outpatient  - noted to have edema to LUE, will require compression garment     # Neuropathy  - continue lyrica (re-increased back to TID dosing)     # Anemia (improved)  - Likely reactive  - continue monitoring CBC       Results from last 7 days  Lab Units 11/16/17  0643 11/13/17  0616   HEMOGLOBIN g/dL 11 4* 11 7     # HTN  - Continue Amlodipine     Temp:  [97 5 °F (36 4 °C)-97 9 °F (36 6 °C)] 97 5 °F (36 4 °C)  HR:  [64-89] 89  Resp:  [18-20] 18  BP: ()/(57-75) 115/75    # Hypothyroid  - Continue levothyroxine      # Pain  - Continue tylenol PRN, for max of 3gm daily     - Continue oxycodone   - Continue MS Contin  - This patient record was searched in the [place] PDMP and the risks/benefits of prescribing this patient medications with high abuse/dependence potential was assessed and determined to be medically appropriate at this time  I could not find any prescriptions in [place], [place]  Patient reports she was getting oxycodone 10mg TID (chart review notes she was taking 10mg Q6h PRN), and MS contin 15mg BID  - Re-increased lyrica back to TID dosing     # Rehab Psych  - Continue alprazolam PRN  - Neuropsych consult, appreciate recs     # FENA/prophy  - Diet: cardiac   SLP and nutrition to monitor and adjust as necessary   - DVT prophy: Sequential compression device (Venodyne)  and Heparin  - GI ppx: Pantaprazole  - Bowel: colace , dulcolax suppository  and miralax PRN  - Nausea: Zofran  - Supplements: None  - Sleep: None      CODE: Level 1: Full Code    Objective:  Nursing staff reporting: no problems    Functional Update:  Physical Therapy Occupational Therapy   Weight Bearing Status: Weight Bearing as Tolerated  Transfers: Supervision (sit pivot only)  Bed Mobility: Supervision  Amulation Distance (ft): 0 feet  Ambulation: Supervision  Assistive Device for Ambulation:  (NA)  Wheelchair Mobility Distance: 20 ft  Wheelchair Mobility: Minimal Assistance, Moderate Assistance  Number of Stairs: 0 (not safe)  Discharge Recommendations: Home with:  DC Home with[de-identified] 24 Hour Assisteance, 24 Hour Supervision, Family Support, First Floor Setup, Home Physical Therapy   Eating: Supervision  Grooming: Supervision  Bathing: Minimal Assistance  Bathing: Minimal Assistance  Upper Body Dressing: Supervision  Lower Body Dressing: Maximum Assistance  Toileting: Maximum Assistance  Tub/Shower Transfer: Moderate Assistance (shower sit pivot to tub bench)  Toilet Transfer: Moderate Assistance  Cognition: Exceptions to WNL  Cognition: Decreased Memory, Decreased Attention  Orientation: Person, Place, Time, Situation       Allergies and Medications per EMR    Physical Exam:  General: alert, no apparent distress, cooperative and comfortable  HENMT: Head: Normal, normocephalic, atraumatic  Eye: Normal external eye, conjunctiva, lids   Ears: Normal external ears    Nose: Normal external nose, mucus membranes  Pulmonary: chest expansion normal, no retractions, no accessory muscle usage  Abdomen: soft, nontender, nondistended, no masses or organomegaly  Skin/Extremity: no rashes, no erythema, LUE edema (which patient states is her baseline)  Neurologic: Awake alert orientedx3  Psych: normal mood, behavior, speech, dress, and thought processes    Diagnostic Studies:  Reviewed, no new imaging    Vitals: Reviewed   Temp:  [97 5 °F (36 4 °C)-97 9 °F (36 6 °C)] 97 5 °F (36 4 °C)  HR:  [64-89] 89  Resp:  [18-20] 18  BP: ()/(57-75) 115/75   Intake/Output Summary (Last 24 hours) at 11/16/17 1115  Last data filed at 11/16/17 0835   Gross per 24 hour   Intake              780 ml   Output                0 ml   Net              780 ml        Laboratory: Reviewed  Lab Results   Component Value Date    HGB 11 4 (L) 11/16/2017    HCT 33 7 (L) 11/16/2017    WBC 8 85 11/16/2017     Lab Results   Component Value Date    BUN 22 11/16/2017     (L) 11/16/2017    K 4 4 11/16/2017     11/16/2017    GLUCOSE 98 11/16/2017 CREATININE 0 40 (L) 11/16/2017     No results found for: PROTIME, INR     Patient Active Problem List   Diagnosis    Cervical [address] compression with myelopathy [address] [address] [address] )    Breast cancer ([facility] )    Essential hypertension    Hypothyroidism       ** Please Note: Fluency Direct voice to text software may have been used in the creation of this document  **    [ x ] Total time spent: 30 Mins, and greater than 50% of this time was spent counseling/coordinating care

## 2017-11-17 VITALS
BODY MASS INDEX: 26.86 KG/M2 | SYSTOLIC BLOOD PRESSURE: 130 MMHG | HEART RATE: 89 BPM | HEIGHT: 62 IN | RESPIRATION RATE: 20 BRPM | DIASTOLIC BLOOD PRESSURE: 79 MMHG | OXYGEN SATURATION: 100 % | WEIGHT: 145.94 LBS | TEMPERATURE: 97.8 F

## 2017-11-17 RX ORDER — OXYCODONE HYDROCHLORIDE 5 MG/1
10 TABLET ORAL EVERY 8 HOURS PRN
Qty: 42 TABLET | Refills: 0 | Status: SHIPPED | OUTPATIENT
Start: 2017-11-17 | End: 2017-11-24

## 2017-11-17 RX ORDER — MORPHINE SULFATE 15 MG/1
15 TABLET, FILM COATED, EXTENDED RELEASE ORAL 2 TIMES DAILY
Qty: 14 TABLET | Refills: 0 | Status: SHIPPED | OUTPATIENT
Start: 2017-11-17 | End: 2017-11-24

## 2017-11-17 RX ADMIN — PANTOPRAZOLE SODIUM 40 MG: 40 TABLET, DELAYED RELEASE ORAL at 05:37

## 2017-11-17 RX ADMIN — MORPHINE SULFATE 15 MG: 15 TABLET, EXTENDED RELEASE ORAL at 08:59

## 2017-11-17 RX ADMIN — HEPARIN SODIUM 5000 UNITS: 5000 INJECTION, SOLUTION INTRAVENOUS; SUBCUTANEOUS at 05:37

## 2017-11-17 RX ADMIN — LIDOCAINE 1 PATCH: 50 PATCH CUTANEOUS at 09:00

## 2017-11-17 RX ADMIN — DEXAMETHASONE 4 MG: 4 TABLET ORAL at 05:37

## 2017-11-17 RX ADMIN — PREGABALIN 50 MG: 50 CAPSULE ORAL at 08:57

## 2017-11-17 RX ADMIN — ACETAMINOPHEN 975 MG: 325 TABLET, FILM COATED ORAL at 05:37

## 2017-11-17 RX ADMIN — ALPRAZOLAM 0.25 MG: 0.25 TABLET ORAL at 09:05

## 2017-11-17 RX ADMIN — TOLTERODINE TARTRATE 4 MG: 4 CAPSULE, EXTENDED RELEASE ORAL at 09:00

## 2017-11-17 RX ADMIN — AMLODIPINE BESYLATE 5 MG: 5 TABLET ORAL at 08:59

## 2017-11-17 RX ADMIN — LEVOTHYROXINE SODIUM 100 MCG: 100 TABLET ORAL at 05:36

## 2017-11-17 NOTE — PHYSICAL THERAPY NOTE
PT D/C Summary:    Pt D/C home on 11/17/17 with her son  Pt made progress throughout LOS however did not meet LTG's  Focused on w/c mobility as primary means of mobility and sit pivots for transfers; pt's knees cont to buckle during gait training in parallel bars and she required mod-max A for ambulating short distances W/C was ordered for pt prior to D/C  FT was performed with her son re: w/c management, assist during transfers, and bumping pt up 2 steps using w/c; pt's son able to demonstrate all techniques appropriately and safely  Pt cont to present with significant coordination/sensation/strength deficits BLE's (R > L) impacting her ability to stand and walk  Pt will cont to benefit from skilled home PT to cont to maximize safety with all mobility and improve LE strength and coordination

## 2017-11-17 NOTE — PROGRESS NOTES
Physical Medicine and Rehabilitation Progress Note:  Spinal Cord Dysfunction:  Non-Traumatic:  04 130 Other Non-Traumatic Cord Compression  Clinton Womack 81 y o  female MRN: [id number]  Unit/Bed#: -01 Encounter: [id number]    Assessment:   Darcie Nguyen is a 81 y o  female who  has a past medical history of Arthritis; Asthma; Breast cancer ([address] ); Disease of thyroid gland; and Hypertension  and [address] after outpatient MRI demonstrated cord compression  Palliative treatment to spine started  Surgical intervention not recommended due to extent of metastasis  She was accepted to [facility] on 11/03/17  Subjective: No acute events overnight  Abby Srivastava was seen while in her room  She is excited to be going home today  We discussed her discharge today, including prescriptions and follow-up appointments  ROS: A 10-point ROS was performed  Negative except as listed above  Restrictions include:  Fall precautions    Disposition: Discharge home today     Plan:  # Cord compression, myelopathy  - Acute comprehensive interdisciplinary inpatient rehabilitation including PT, OT, SLP, RN, CM, SW, dietary, psychology, etc   - Appreciate Internal Medicine following - Dr Mimi Baxter service      - secondary to spinal mets  - Completed XRT (total of 10 sessions)  - Continue steroids    - Discussed with Dr Aleksandr Henry office, patient to have followup next week with PET scan prior to appoitnment  - F/u with Dr Lemos Smoker    # Left lateral leg pain (improving)  - likely due to tight IT-band  - will instruct therapy to perform stretching    # Hyponatremia  - continue to monitor    Results from last 7 days  Lab Units 11/16/17  0643 11/13/17  0616   SODIUM mmol/L 134* 133*     # Mild Leukocytosis (improved)  - continue to monitor  - no fever or chills noted  - continue mucinex for productive cough      Results from last 7 days  Lab Units 11/16/17  0643 11/13/17  0616   WBC Thousand/uL 8 85 10 59*     # Chest pain (resolved)  - EKG performed, per IM team, demonstrates multiple PVCs  - Troponins negative x 3  - Continue to monitor for now      # Metastatic breast CA  - f/u with oncology as outpatient  - noted to have edema to LUE, will require compression garment  - f/u next week with Dr Eugenie Mir       # Neuropathy  - continue lyrica (re-increased back to TID dosing)     # Anemia (improved)  - Likely reactive  - continue monitoring CBC       Results from last 7 days  Lab Units 11/16/17  0643 11/13/17  0616   HEMOGLOBIN g/dL 11 4* 11 7     # HTN  - Continue Amlodipine     Temp:  [97 5 °F (36 4 °C)-97 8 °F (36 6 °C)] 97 8 °F (36 6 °C)  HR:  [] 89  Resp:  [20] 20  BP: (108-130)/(73-79) 130/79    # Hypothyroid  - Continue levothyroxine      # Pain  - Continue tylenol PRN, for max of 3gm daily     - Continue oxycodone   - Continue MS Contin  - This patient record was searched in the [place] PDMP and the risks/benefits of prescribing this patient medications with high abuse/dependence potential was assessed and determined to be medically appropriate at this time  I could not find any prescriptions in [place], [place]  Patient reports she was getting oxycodone 10mg TID (chart review notes she was taking 10mg Q6h PRN), and MS contin 15mg BID  Discussed with Dr Krish Zabala office and confirmed above MS contin and oxycodone dose  - Re-increased lyrica back to TID dosing     # Rehab Psych  - Continue alprazolam PRN  - Neuropsych consult, appreciate recs     # FENA/prophy  - Diet: cardiac   SLP and nutrition to monitor and adjust as necessary   - DVT prophy: Sequential compression device (Venodyne)  and Heparin  - GI ppx: Pantaprazole  - Bowel: colace , dulcolax suppository  and miralax PRN  - Nausea: Zofran  - Supplements: None  - Sleep: None      CODE: Level 1: Full Code    Objective:  Nursing staff reporting: no problems    Functional Update:  Physical Therapy Occupational Therapy   Weight Bearing Status: Weight Bearing as Tolerated  Transfers: Supervision (sit pivot only)  Bed Mobility: Supervision  Amulation Distance (ft): 0 feet  Ambulation: Supervision  Assistive Device for Ambulation:  (NA)  Wheelchair Mobility Distance: 20 ft  Wheelchair Mobility: Minimal Assistance, Moderate Assistance  Number of Stairs: 0 (not safe)  Discharge Recommendations: Home with:  [address] with[de-identified] 24 Hour Assisteance, 24 Hour Supervision, Family Support, First Floor Setup, Home Physical Therapy   Eating: Supervision  Grooming: Supervision  Bathing: Minimal Assistance  Bathing: Minimal Assistance  Upper Body Dressing: Supervision  Lower Body Dressing: Maximum Assistance  Toileting: Maximum Assistance  Tub/Shower Transfer: Moderate Assistance (shower sit pivot to tub bench)  Toilet Transfer: Moderate Assistance  Cognition: Exceptions to WNL  Cognition: Decreased Memory, Decreased Attention  Orientation: Person, Place, Time, Situation       Allergies and Medications per EMR    Physical Exam:  General: alert, no apparent distress, cooperative and comfortable  HENMT: Head: Normal, normocephalic, atraumatic  Eye: Normal external eye, conjunctiva, lids   Ears: Normal external ears    Nose: Normal external nose, mucus membranes  Pulmonary: chest expansion normal, no retractions, no accessory muscle usage  Abdomen: soft, nontender, nondistended, no masses or organomegaly  Skin/Extremity: no rashes, no erythema, LUE edema (which patient states is her baseline)  Neurologic: Awake alert orientedx3  Psych: normal mood, behavior, speech, dress, and thought processes    Diagnostic Studies:  Reviewed, no new imaging    Vitals: Reviewed   Temp:  [97 5 °F (36 4 °C)-97 8 °F (36 6 °C)] 97 8 °F (36 6 °C)  HR:  [] 89  Resp:  [20] 20  BP: (108-130)/(73-79) 130/79   Intake/Output Summary (Last 24 hours) at 11/17/17 1025  Last data filed at 11/17/17 0810   Gross per 24 hour   Intake              557 ml   Output              650 ml   Net              -93 ml Laboratory: Reviewed  Lab Results   Component Value Date    HGB 11 4 (L) 11/16/2017    HCT 33 7 (L) 11/16/2017    WBC 8 85 11/16/2017     Lab Results   Component Value Date    BUN 22 11/16/2017     (L) 11/16/2017    K 4 4 11/16/2017     11/16/2017    GLUCOSE 98 11/16/2017    CREATININE 0 40 (L) 11/16/2017     No results found for: PROTIME, INR     Patient Active Problem List   Diagnosis    Cervical cord compression with myelopathy (HCC)    Breast cancer ([facility] )    Essential hypertension    Hypothyroidism       ** Please Note: Fluency Direct voice to text software may have been used in the creation of this document  **    [ x ] Total time spent: 30 Mins, and greater than 50% of this time was spent counseling/coordinating care

## 2017-11-17 NOTE — PROGRESS NOTES
Internal Medicine Progress Note  Patient: Xander Paz  Age/sex: 81 y o  female  Medical Record #: [id number]      ASSESSMENT/PLAN:  Xander Paz is seen and examined and mangement for following issues:    1  Cervical cord compression with myelopathy/Mass at [facility]: Likely related to metastatic disease  XRT completed to lumbar and cervical spine  Continue Decadron taper per Rad-Onc  Continue  Pain control  Therapy per primary team    2  Metastatic breast cancer: patient with moderate left pleural effusion, multiple hepatic lesions, possible adrenal mass and multiple bony lesions  Overall prognosis poor  Outpatient follow up with primary oncologist  Previously on Xeloda  3  Left pleural effusion: likely malignant  Monitor respiratory status  Currently denies complaints of SOB  4  Hypertension: continue norvasc and monitor blood pressure  Well controlled  5  Hypothyroidism: continue levothyroxine  6  Peripheral neuropathy: continue Lyrica  7  DVT prophylaxis: SQ heparin  8  Hyponatremia:  Stable at 134  Asymptomatic  Will monitor  9  Overactive bladder:  Detrol LA started  10  Chest pain: All VSS/ EKG reveals frequent PVCs otherwise WNL, Troponin negative x 3  Anxiolytic administered  ? Anxiety related, vs from bony mets? No further pain reported  11  LUE Edema: has component of lymphedema with an increase in edema over the weekend  Doppler negative  Continue compression garment                Subjective: Patient seen and examined  Pt reports feeling well today  She is looking for discharge  She would like Xanax to be prescribed as it has been useful here  Patient denies any complaints       ROS:   GI: denies abdominal pain, change bowel habits or reflux symptoms  Neuro: No new neurologic changes  Respiratory: No Cough, SOB  Cardiovascular: No CP, palpitations     Scheduled Meds:    acetaminophen 975 mg Oral Q8H [address]   amLODIPine 5 mg Oral Daily   dexamethasone 4 mg Oral [facility] [START ON 11/19/2017] dexamethasone 4 mg Oral Q12H [facility]   heparin (porcine) 5,000 Units Subcutaneous Q8H [facility]   lactulose 20 g Oral BID   levothyroxine 100 mcg Oral Early Morning   lidocaine 1 patch Transdermal Daily   morphine 15 mg Oral BID   pantoprazole 40 mg Oral Early Morning   pregabalin 50 mg Oral TID   senna-docusate sodium 1 tablet Oral HS   tolterodine 4 mg Oral Daily       Labs:       Results from last 7 days  Lab Units 11/16/17  0643 11/13/17  0616   WBC Thousand/uL 8 85 10 59*   HEMOGLOBIN g/dL 11 4* 11 7   HEMATOCRIT % 33 7* 34 6*   PLATELETS Thousands/uL 150 191       Results from last 7 days  Lab Units 11/16/17  0643 11/13/17  0616   SODIUM mmol/L 134* 133*   POTASSIUM mmol/L 4 4 4 7   CHLORIDE mmol/L 102 101   CO2 mmol/L 25 25   BUN mg/dL 22 26*   CREATININE mg/dL 0 40* 0 37*   GLUCOSE RANDOM mg/dL 98 100   CALCIUM mg/dL 7 3* 7 4*                  Glucose (mg/dL)   Date Value   11/16/2017 98   11/13/2017 100   11/09/2017 92   11/06/2017 102       Labs reviewed    Physical Examination:  Vitals:   Vitals:    11/16/17 1459 11/16/17 1534 11/16/17 2317 11/17/17 0708   BP: 119/79 123/77 108/73 130/79   Pulse: 104 91 93 89   Resp:  20 20 20   Temp:  97 5 °F (36 4 °C) 97 7 °F (36 5 °C) 97 8 °F (36 6 °C)   TempSrc:  Oral Oral Oral   SpO2:  100% 98% 100%   Weight:       Height:           GEN: NAD, nontoxic  HEENT: NC/AT, EOMI  RESP: CTAB, no R/R/W  CV: +S1 S2, regular rate, no rubs, no chest wall tenderness  ABD: soft, NT, ND, normal BS   : No Singh  EXT: No edema bilateral LE, + mild edema of LUE, non pitting  Skin: No rashes  Neuro: AAOx3  Bilat pill rolling tremor  [ X ] Total time spent: 30 Mins and greater than 50% of this time was spent counseling/coordinating care        Vibha Byrd PA-C  Internal Medicine

## 2017-11-17 NOTE — DISCHARGE SUMMARY
Discharge Summary - PMR   Karyle Query 81 y o  female MRN: [id number]  Unit/Bed#: -01 Encounter: [id number]    Admission Date: 11/2/2017     Discharge Date: 11/17/2017    Rehabilitation Diagnosis: Impairment of mobility, safety and Activities of Daily Living (ADLs) due to Spinal Cord Dysfunction:  Non-Traumatic:  04 130 Other Non-Traumatic Cord compression with myelopathy    Etiologic Diagnosis: Cord compression due to combination of spinal stenosis, spinal mets    HPI:   Karyle Query is a 81 y o  female who  has a past medical history of Arthritis; Asthma; Breast cancer ([address] ); Disease of thyroid gland; and Hypertension  and presented to the [address] [place] [address] after outpatient MRI demonstrated cord compression  MRI demonstrated expansion of spinal cord from T7-T8 to conus, as well as extradural mass posterior to L1 vertebral body; T7 and T8 vertebral bodies were also noted to have abnormal appearance  CT chest demonstrated mild compression fractures of T7 and T8, bilateral soft tissue masses larger in PATRIC, a moderate-sized left pleural effusion, multiple hepatic lesions which were considered suspicious for metastatic disease, left adrenal gland abnormality suspicious metastatic disease, and multiple bony lesions, again suspicious for metastatic disease  Repeat MRI of C-spine demonstrated cord enhancement at level of C4-C5on right; metastatic disease could not be excluded  MRI brain was negative for intracranial metastasis but did demonstrate left superior neck mass measuring 1 6cm     Patient reports getting treatment at [facility], with Dr Jacquelyn Bojorquez and Dr Marisel Guzmán as her physicians  She was also seeing oncologist in [place]  Oncology service was consulted, with prognosis as guarded  Dexamethasone was increased  Palliative treatment to spine started  Surgical intervention not recommended due to extent of metastasis    She was accepted to [facility] on 11/03/17      Procedures Performed During ARC Admission: None    Acute Rehabilitation Center Course:   Patient participated in a comprehensive interdisciplinary inpatient rehabilitation program which included involvment of MD, therapies (PT, OT, and/or SLP), RN, CM, SW, dietary, and psychology services  she was able to be advanced to a supervision level of assist and considered safe for discharge home with family  Please see below for patient's day to day management of medical needs  # Cord compression, myelopathy  - Acute comprehensive interdisciplinary inpatient rehabilitation including PT, OT, SLP, RN, CM, SW, dietary, psychology, etc   - Appreciate Internal Medicine following - Dr Lana Medrano     - secondary to spinal mets  - Completed XRT (total of 10 sessions)  - Continue steroids   Calendar provided for steroid taper regimen       - Discussed with Dr Loredo Slider office, patient to have followup next week with PET scan prior to appoitnment  - F/u with Dr Sally Vasques     # Left lateral leg pain (improved)  - likely due to tight IT-band  - will instruct therapy to perform stretching  - did improve when TID dosing for lyrica was resumed     # Hyponatremia  - continue to monitor     Results from last 7 days  Lab Units 11/16/17  0643 11/13/17  0616   SODIUM mmol/L 134* 133*      # Mild Leukocytosis (improved)  - continue to monitor  - no fever or chills noted  - continue mucinex for productive cough     Results from last 7 days  Lab Units 11/16/17  0643 11/13/17  0616   WBC Thousand/uL 8 85 10 59*      # Chest pain (resolved)  - EKG performed, per IM team, demonstrates multiple PVCs  - Troponins negative x 3  - Continue to monitor for now      # Metastatic breast CA  - f/u with oncology as outpatient  - noted to have edema to LUE, will require compression garment  - f/u next week with Dr Gato Suárez       # Neuropathy  - continue lyrica (increased back to TID dosing)     # Anemia (improved)  - Likely reactive  - continue monitoring CBC     Results from last 7 days  Lab Units 11/16/17  0643 11/13/17  0616   HEMOGLOBIN g/dL 11 4* 11 7      # HTN  - Continue Amlodipine     Temp:  [97 5 °F (36 4 °C)-97 8 °F (36 6 °C)] 97 8 °F (36 6 °C)  HR:  [] 89  Resp:  [20] 20  BP: (108-130)/(73-79) 130/79     # Hypothyroid  - Continue levothyroxine      # Pain  - Continue tylenol PRN, for max of 3gm daily     - Continue oxycodone   - Continue MS Contin  - This patient [place] was searched in the [place]MP and the risks/benefits of prescribing this patient medications with high abuse/dependence potential was assessed and determined to be medically appropriate at this time  I could not find any prescriptions in [place], [place]  Patient reports she was getting oxycodone 10mg TID (chart review notes she was taking 10mg Q6h PRN), and MS contin 15mg BID  Discussed with Dr Noemy Mckenzie office and confirmed above MS contin and oxycodone dose  - Re-increased lyrica back to TID dosing     # Rehab Psych  - Continue alprazolam PRN  - Neuropsych consult, appreciate recs     # FENA/prophy  - Diet: cardiac  SLP and nutrition to monitor and adjust as necessary   - DVT prophy: Sequential compression device (Venodyne)  and Heparin  - GI ppx: Pantaprazole  - Bowel: colace , dulcolax suppository  and miralax PRN  - Nausea: Zofran  - Supplements: None  - Sleep: None      CODE: Level 1: Full Code    Discharge Physical Examination:  General: alert, no apparent distress, cooperative and comfortable  HENMT: Head: Normal, normocephalic, atraumatic  Eye: Normal external eye, conjunctiva, lids   Ears: Normal external ears    Nose: Normal external nose, mucus membranes  Pulmonary: chest expansion normal, no retractions, no accessory muscle usage  Abdomen: soft, nontender, nondistended, no masses or organomegaly  Skin/Extremity: no rashes, no erythema, LUE edema (which patient states is her baseline)  Neurologic: Awake alert orientedx3  Psych: normal mood, behavior, speech, dress, and thought processes    Significant Findings, Care, Treatment and Services Provided: Acute comprehensive interdisciplinary inpatient rehabilitation including PT, OT, SLP, RN, CM, SW, dietary, psychology, etc     Complications: None    Functional Status Upon Discharge:    Physical Therapy Occupational Therapy   Weight Bearing Status: Weight Bearing as Tolerated  Transfers: Supervision (sit pivot only)  Bed Mobility: Supervision  Amulation Distance (ft): 0 feet  Ambulation: Supervision  Assistive Device for Ambulation:  (NA)  Wheelchair Mobility Distance: 20 ft  Wheelchair Mobility: Minimal Assistance, Moderate Assistance  Number of Stairs: 0 (not safe)  Discharge Recommendations: Home with:  [address] with[de-identified] 24 Hour Assisteance, 24 Hour Supervision, Family Support, First Floor Setup, Home Physical Therapy   Eating: Supervision  Grooming: Supervision  Bathing: Minimal Assistance  Bathing: Minimal Assistance  Upper Body Dressing: Supervision  Lower Body Dressing: Maximum Assistance  Toileting: Maximum Assistance  Tub/Shower Transfer: Moderate Assistance (shower sit pivot to tub bench)  Toilet Transfer: Moderate Assistance  Cognition: Exceptions to WNL  Cognition: Decreased Memory, Decreased Attention  Orientation: Person, Place, Time, Situation         Discharge Diagnosis: Impairment of mobility, safety and Activities of Daily Living (ADLs) due to Spinal Cord Dysfunction:  Non-Traumatic:  04 130 Other Non-Traumatic Cord compression with myelopathy    Discharge Medications:   See after visit summary for reconciled discharge medications provided to patient and family  Condition at Discharge: stable     Discharge instructions/Information to patient and family:   See after visit summary for information provided to patient and family  Provisions for Follow-Up Care:  See after visit summary for information related to follow-up care and any pertinent home health orders        Disposition: Home    Planned Readmission: No    Discharge Statement   I spent 45 minutes discharging the patient  This time was spent on the day of discharge  I had direct contact with the patient on the day of discharge  Greater than 50% of the total time was spent examining patient, answering all patient questions, arranging and discussing plan of care with patient as well as directly providing post-discharge instructions  Additional time then spent on discharge activities  Discharge Medications:  See after visit summary for reconciled discharge medications provided to patient and family

## 2017-11-20 NOTE — CASE MANAGEMENT
Team dc summary - pt made good progress and returned home w/son and contd hhc services through [facility] for rn pt and ot  Pt received a w/c with cushion, and commode through [facility]  Son present for dc instructions and aware of pts functional ability

## 2017-11-24 NOTE — OCCUPATIONAL THERAPY NOTE
OT D/C SUMMARY    Pt  D/c home with 24hr S and A provided by sonJustin  Pt  Functioning at a min A toileting, S UB dressing and grooming and eating, mod to max A toilet txfers, total A LB dressing  FT completed with sonJustin  Discussed pt  Deficits and need for A for all aspects of ADL/IADL  Pt  Demonstrated G carryover proper positioning with transfers  Pt  Willing/able to A pt  Upon d/c prn  Pt  demo  Decreased recall, hypersensitivity to tactile input from clothing  Prefers to only wear nightgown and undergarment  Pt  Continues to present with severe paraesthesia, decreased coordination, and decreased strength BLE (R>L) which affect her ability to complete sit to/from stand and walking  Recommend sit pivots upon d/c 2* HIGH fall risk due to the later  Edu  Son blocking of R knee with sit to stand 2* knee giving which may cause fall  Pt  Son receptive  Plan to have BSC next to bed to complete sit pivot txfers  Edu  Son on weightshifting to maintain skin integrity  Recommend home OT services to address carryover of caregiver A for pt , pt  Psychosocial wellbeing, management of ADL tasks, endurance, and strengthening

## 2017-12-13 ENCOUNTER — ALLSCRIPTS OFFICE VISIT (OUTPATIENT)
Dept: OTHER | Facility: OTHER | Age: 72
End: 2017-12-13

## 2017-12-14 ENCOUNTER — LAB REQUISITION (OUTPATIENT)
Dept: LAB | Facility: HOSPITAL | Age: 72
End: 2017-12-14
Payer: MEDICARE

## 2017-12-14 DIAGNOSIS — G95.9 DISEASE OF SPINAL CORD (HCC): ICD-10-CM

## 2017-12-14 LAB
ALBUMIN SERPL BCP-MCNC: 2.5 G/DL (ref 3.5–5)
ALP SERPL-CCNC: 71 U/L (ref 46–116)
ALT SERPL W P-5'-P-CCNC: 37 U/L (ref 12–78)
ANION GAP SERPL CALCULATED.3IONS-SCNC: 9 MMOL/L (ref 4–13)
AST SERPL W P-5'-P-CCNC: 26 U/L (ref 5–45)
BILIRUB SERPL-MCNC: 0.3 MG/DL (ref 0.2–1)
BUN SERPL-MCNC: 11 MG/DL (ref 5–25)
CALCIUM SERPL-MCNC: 8.1 MG/DL (ref 8.3–10.1)
CHLORIDE SERPL-SCNC: 103 MMOL/L (ref 100–108)
CO2 SERPL-SCNC: 27 MMOL/L (ref 21–32)
CREAT SERPL-MCNC: 0.5 MG/DL (ref 0.6–1.3)
GFR SERPL CREATININE-BSD FRML MDRD: 113 ML/MIN/1.73SQ M
GLUCOSE SERPL-MCNC: 110 MG/DL (ref 65–140)
POTASSIUM SERPL-SCNC: 4.2 MMOL/L (ref 3.5–5.3)
PROT SERPL-MCNC: 5.9 G/DL (ref 6.4–8.2)
SODIUM SERPL-SCNC: 139 MMOL/L (ref 136–145)

## 2017-12-14 PROCEDURE — 84165 PROTEIN E-PHORESIS SERUM: CPT | Performed by: FAMILY MEDICINE

## 2017-12-14 PROCEDURE — 82746 ASSAY OF FOLIC ACID SERUM: CPT | Performed by: FAMILY MEDICINE

## 2017-12-14 PROCEDURE — 84166 PROTEIN E-PHORESIS/URINE/CSF: CPT | Performed by: FAMILY MEDICINE

## 2017-12-14 PROCEDURE — 84207 ASSAY OF VITAMIN B-6: CPT | Performed by: FAMILY MEDICINE

## 2017-12-14 PROCEDURE — 82607 VITAMIN B-12: CPT | Performed by: FAMILY MEDICINE

## 2017-12-14 PROCEDURE — 80053 COMPREHEN METABOLIC PANEL: CPT | Performed by: FAMILY MEDICINE

## 2017-12-15 LAB
FOLATE SERPL-MCNC: >20 NG/ML (ref 3.1–17.5)
VIT B12 SERPL-MCNC: 2380 PG/ML (ref 100–900)

## 2017-12-16 LAB
ALBUMIN SERPL ELPH-MCNC: 2.8 G/DL (ref 3.5–5)
ALBUMIN SERPL ELPH-MCNC: 49.2 % (ref 52–65)
ALPHA1 GLOB SERPL ELPH-MCNC: 0.38 G/DL (ref 0.1–0.4)
ALPHA1 GLOB SERPL ELPH-MCNC: 6.6 % (ref 2.5–5)
ALPHA2 GLOB SERPL ELPH-MCNC: 1.13 G/DL (ref 0.4–1.2)
ALPHA2 GLOB SERPL ELPH-MCNC: 19.8 % (ref 7–13)
BETA GLOB ABNORMAL SERPL ELPH-MCNC: 0.33 G/DL (ref 0.4–0.8)
BETA1 GLOB SERPL ELPH-MCNC: 5.8 % (ref 5–13)
BETA2 GLOB SERPL ELPH-MCNC: 6.3 % (ref 2–8)
BETA2+GAMMA GLOB SERPL ELPH-MCNC: 0.36 G/DL (ref 0.2–0.5)
GAMMA GLOB ABNORMAL SERPL ELPH-MCNC: 0.7 G/DL (ref 0.5–1.6)
GAMMA GLOB SERPL ELPH-MCNC: 12.3 % (ref 12–22)
IGG/ALB SER: 0.97 {RATIO} (ref 1.1–1.8)
PROT PATTERN SERPL ELPH-IMP: ABNORMAL
PROT SERPL-MCNC: 5.7 G/DL (ref 6.4–8.2)

## 2017-12-19 LAB
ALBUMIN UR ELPH-MCNC: 100 %
ALPHA1 GLOB MFR UR ELPH: 0 %
ALPHA2 GLOB MFR UR ELPH: 0 %
B-GLOBULIN MFR UR ELPH: 0 %
GAMMA GLOB MFR UR ELPH: 0 %
PROT PATTERN UR ELPH-IMP: ABNORMAL
PROT UR-MCNC: 21 MG/DL
VIT B6 SERPL-MCNC: 30.8 UG/L (ref 2–32.8)

## 2017-12-20 ENCOUNTER — APPOINTMENT (EMERGENCY)
Dept: RADIOLOGY | Facility: HOSPITAL | Age: 72
DRG: 872 | End: 2017-12-20
Payer: MEDICARE

## 2017-12-20 ENCOUNTER — HOSPITAL ENCOUNTER (INPATIENT)
Facility: HOSPITAL | Age: 72
LOS: 1 days | Discharge: HOME HEALTH | DRG: 872 | End: 2017-12-22
Attending: EMERGENCY MEDICINE | Admitting: INTERNAL MEDICINE
Payer: MEDICARE

## 2017-12-20 DIAGNOSIS — R50.9 FEVER: Primary | ICD-10-CM

## 2017-12-20 DIAGNOSIS — C50.919 BREAST CANCER (HCC): ICD-10-CM

## 2017-12-20 DIAGNOSIS — S31.000S WOUND OF SACRAL REGION, SEQUELA: ICD-10-CM

## 2017-12-20 PROBLEM — K59.00 CONSTIPATION: Status: ACTIVE | Noted: 2017-12-20

## 2017-12-20 PROBLEM — D64.9 NORMOCYTIC ANEMIA: Status: ACTIVE | Noted: 2017-12-20

## 2017-12-20 PROBLEM — S31.000A SACRAL WOUND: Status: ACTIVE | Noted: 2017-12-20

## 2017-12-20 PROBLEM — R68.89 FLU-LIKE SYMPTOMS: Status: ACTIVE | Noted: 2017-12-20

## 2017-12-20 LAB
ALBUMIN SERPL BCP-MCNC: 2.5 G/DL (ref 3.5–5)
ALP SERPL-CCNC: 80 U/L (ref 46–116)
ALT SERPL W P-5'-P-CCNC: 27 U/L (ref 12–78)
ANION GAP SERPL CALCULATED.3IONS-SCNC: 9 MMOL/L (ref 4–13)
APTT PPP: 30 SECONDS (ref 23–35)
AST SERPL W P-5'-P-CCNC: 25 U/L (ref 5–45)
BASOPHILS # BLD AUTO: 0.03 THOUSANDS/ΜL (ref 0–0.1)
BASOPHILS NFR BLD AUTO: 0 % (ref 0–1)
BILIRUB SERPL-MCNC: 0.4 MG/DL (ref 0.2–1)
BILIRUB UR QL STRIP: NEGATIVE
BUN SERPL-MCNC: 11 MG/DL (ref 5–25)
CALCIUM SERPL-MCNC: 8.4 MG/DL (ref 8.3–10.1)
CHLORIDE SERPL-SCNC: 100 MMOL/L (ref 100–108)
CLARITY UR: CLEAR
CO2 SERPL-SCNC: 27 MMOL/L (ref 21–32)
COLOR UR: YELLOW
CREAT SERPL-MCNC: 0.71 MG/DL (ref 0.6–1.3)
EOSINOPHIL # BLD AUTO: 0 THOUSAND/ΜL (ref 0–0.61)
EOSINOPHIL NFR BLD AUTO: 0 % (ref 0–6)
ERYTHROCYTE [DISTWIDTH] IN BLOOD BY AUTOMATED COUNT: 17.4 % (ref 11.6–15.1)
GFR SERPL CREATININE-BSD FRML MDRD: 100 ML/MIN/1.73SQ M
GLUCOSE SERPL-MCNC: 98 MG/DL (ref 65–140)
GLUCOSE UR STRIP-MCNC: NEGATIVE MG/DL
HCT VFR BLD AUTO: 36 % (ref 34.8–46.1)
HGB BLD-MCNC: 11.4 G/DL (ref 11.5–15.4)
HGB UR QL STRIP.AUTO: NEGATIVE
INR PPP: 1.05 (ref 0.86–1.16)
KETONES UR STRIP-MCNC: NEGATIVE MG/DL
LACTATE SERPL-SCNC: 1.3 MMOL/L (ref 0.5–2)
LEUKOCYTE ESTERASE UR QL STRIP: NEGATIVE
LYMPHOCYTES # BLD AUTO: 2.21 THOUSANDS/ΜL (ref 0.6–4.47)
LYMPHOCYTES NFR BLD AUTO: 30 % (ref 14–44)
MCH RBC QN AUTO: 28.5 PG (ref 26.8–34.3)
MCHC RBC AUTO-ENTMCNC: 31.7 G/DL (ref 31.4–37.4)
MCV RBC AUTO: 90 FL (ref 82–98)
MONOCYTES # BLD AUTO: 0.79 THOUSAND/ΜL (ref 0.17–1.22)
MONOCYTES NFR BLD AUTO: 11 % (ref 4–12)
NEUTROPHILS # BLD AUTO: 4.3 THOUSANDS/ΜL (ref 1.85–7.62)
NEUTS SEG NFR BLD AUTO: 58 % (ref 43–75)
NITRITE UR QL STRIP: NEGATIVE
NRBC BLD AUTO-RTO: 0 /100 WBCS
PH UR STRIP.AUTO: 6 [PH] (ref 4.5–8)
PLATELET # BLD AUTO: 212 THOUSANDS/UL (ref 149–390)
PMV BLD AUTO: 8.9 FL (ref 8.9–12.7)
POTASSIUM SERPL-SCNC: 3.6 MMOL/L (ref 3.5–5.3)
PROT SERPL-MCNC: 7.1 G/DL (ref 6.4–8.2)
PROT UR STRIP-MCNC: NEGATIVE MG/DL
PROTHROMBIN TIME: 13.9 SECONDS (ref 12.1–14.4)
RBC # BLD AUTO: 4 MILLION/UL (ref 3.81–5.12)
SODIUM SERPL-SCNC: 136 MMOL/L (ref 136–145)
SP GR UR STRIP.AUTO: 1.01 (ref 1–1.03)
TROPONIN I SERPL-MCNC: 0.03 NG/ML
TSH SERPL DL<=0.05 MIU/L-ACNC: 4.92 UIU/ML (ref 0.36–3.74)
UROBILINOGEN UR QL STRIP.AUTO: 0.2 E.U./DL
WBC # BLD AUTO: 7.42 THOUSAND/UL (ref 4.31–10.16)

## 2017-12-20 PROCEDURE — 83605 ASSAY OF LACTIC ACID: CPT | Performed by: EMERGENCY MEDICINE

## 2017-12-20 PROCEDURE — 85610 PROTHROMBIN TIME: CPT | Performed by: EMERGENCY MEDICINE

## 2017-12-20 PROCEDURE — 85730 THROMBOPLASTIN TIME PARTIAL: CPT | Performed by: EMERGENCY MEDICINE

## 2017-12-20 PROCEDURE — 81003 URINALYSIS AUTO W/O SCOPE: CPT | Performed by: EMERGENCY MEDICINE

## 2017-12-20 PROCEDURE — 80053 COMPREHEN METABOLIC PANEL: CPT | Performed by: EMERGENCY MEDICINE

## 2017-12-20 PROCEDURE — 87040 BLOOD CULTURE FOR BACTERIA: CPT | Performed by: EMERGENCY MEDICINE

## 2017-12-20 PROCEDURE — 36415 COLL VENOUS BLD VENIPUNCTURE: CPT | Performed by: EMERGENCY MEDICINE

## 2017-12-20 PROCEDURE — 84484 ASSAY OF TROPONIN QUANT: CPT | Performed by: EMERGENCY MEDICINE

## 2017-12-20 PROCEDURE — 93005 ELECTROCARDIOGRAM TRACING: CPT | Performed by: EMERGENCY MEDICINE

## 2017-12-20 PROCEDURE — 85025 COMPLETE CBC W/AUTO DIFF WBC: CPT | Performed by: EMERGENCY MEDICINE

## 2017-12-20 PROCEDURE — 87798 DETECT AGENT NOS DNA AMP: CPT | Performed by: EMERGENCY MEDICINE

## 2017-12-20 PROCEDURE — 96360 HYDRATION IV INFUSION INIT: CPT

## 2017-12-20 PROCEDURE — 84443 ASSAY THYROID STIM HORMONE: CPT | Performed by: PHYSICIAN ASSISTANT

## 2017-12-20 PROCEDURE — 71020 HB CHEST X-RAY 2VW FRONTAL&LATL: CPT

## 2017-12-20 RX ORDER — ONDANSETRON 2 MG/ML
4 INJECTION INTRAMUSCULAR; INTRAVENOUS EVERY 6 HOURS PRN
Status: DISCONTINUED | OUTPATIENT
Start: 2017-12-20 | End: 2017-12-22 | Stop reason: HOSPADM

## 2017-12-20 RX ORDER — OSELTAMIVIR PHOSPHATE 6 MG/ML
75 FOR SUSPENSION ORAL EVERY 12 HOURS SCHEDULED
Status: DISCONTINUED | OUTPATIENT
Start: 2017-12-20 | End: 2017-12-22

## 2017-12-20 RX ORDER — AMLODIPINE BESYLATE 5 MG/1
5 TABLET ORAL DAILY
Status: DISCONTINUED | OUTPATIENT
Start: 2017-12-21 | End: 2017-12-22 | Stop reason: HOSPADM

## 2017-12-20 RX ORDER — LACTULOSE 20 G/30ML
20 SOLUTION ORAL 2 TIMES DAILY
Status: DISCONTINUED | OUTPATIENT
Start: 2017-12-21 | End: 2017-12-22

## 2017-12-20 RX ORDER — TOLTERODINE 2 MG/1
4 CAPSULE, EXTENDED RELEASE ORAL DAILY
Status: DISCONTINUED | OUTPATIENT
Start: 2017-12-21 | End: 2017-12-22 | Stop reason: HOSPADM

## 2017-12-20 RX ORDER — PANTOPRAZOLE SODIUM 40 MG/1
40 TABLET, DELAYED RELEASE ORAL
Status: DISCONTINUED | OUTPATIENT
Start: 2017-12-21 | End: 2017-12-22 | Stop reason: HOSPADM

## 2017-12-20 RX ORDER — POLYETHYLENE GLYCOL 3350 17 G/17G
17 POWDER, FOR SOLUTION ORAL DAILY
Status: DISCONTINUED | OUTPATIENT
Start: 2017-12-21 | End: 2017-12-22

## 2017-12-20 RX ORDER — ACETAMINOPHEN 325 MG/1
975 TABLET ORAL ONCE
Status: COMPLETED | OUTPATIENT
Start: 2017-12-20 | End: 2017-12-20

## 2017-12-20 RX ORDER — SODIUM CHLORIDE 9 MG/ML
50 INJECTION, SOLUTION INTRAVENOUS CONTINUOUS
Status: DISCONTINUED | OUTPATIENT
Start: 2017-12-20 | End: 2017-12-22

## 2017-12-20 RX ORDER — CALCIUM CARBONATE 200(500)MG
1000 TABLET,CHEWABLE ORAL DAILY PRN
Status: DISCONTINUED | OUTPATIENT
Start: 2017-12-20 | End: 2017-12-22 | Stop reason: HOSPADM

## 2017-12-20 RX ORDER — ENALAPRILAT 2.5 MG/2ML
0.62 INJECTION INTRAVENOUS EVERY 6 HOURS PRN
Status: DISCONTINUED | OUTPATIENT
Start: 2017-12-20 | End: 2017-12-22 | Stop reason: HOSPADM

## 2017-12-20 RX ORDER — PREGABALIN 50 MG/1
50 CAPSULE ORAL 3 TIMES DAILY
Status: DISCONTINUED | OUTPATIENT
Start: 2017-12-20 | End: 2017-12-22 | Stop reason: HOSPADM

## 2017-12-20 RX ORDER — LEVOTHYROXINE SODIUM 0.1 MG/1
100 TABLET ORAL DAILY
Status: DISCONTINUED | OUTPATIENT
Start: 2017-12-21 | End: 2017-12-22 | Stop reason: HOSPADM

## 2017-12-20 RX ORDER — DOCUSATE SODIUM 100 MG/1
100 CAPSULE, LIQUID FILLED ORAL 2 TIMES DAILY
Status: DISCONTINUED | OUTPATIENT
Start: 2017-12-20 | End: 2017-12-22

## 2017-12-20 RX ORDER — ACETAMINOPHEN 325 MG/1
650 TABLET ORAL EVERY 6 HOURS PRN
Status: DISCONTINUED | OUTPATIENT
Start: 2017-12-20 | End: 2017-12-22 | Stop reason: HOSPADM

## 2017-12-20 RX ADMIN — SODIUM CHLORIDE 1000 ML: 0.9 INJECTION, SOLUTION INTRAVENOUS at 18:34

## 2017-12-20 RX ADMIN — SODIUM CHLORIDE 1000 ML: 0.9 INJECTION, SOLUTION INTRAVENOUS at 19:55

## 2017-12-20 RX ADMIN — CEFEPIME HYDROCHLORIDE 1000 MG: 1 INJECTION, SOLUTION INTRAVENOUS at 20:34

## 2017-12-20 RX ADMIN — DOCUSATE SODIUM 100 MG: 100 CAPSULE, LIQUID FILLED ORAL at 23:32

## 2017-12-20 RX ADMIN — ACETAMINOPHEN 975 MG: 325 TABLET ORAL at 18:37

## 2017-12-20 NOTE — ED PROVIDER NOTES
History  Chief Complaint   Patient presents with    Fever - 9 weeks to 76 years     Pt with flu like symptoms for 2 days      80 y/o female presents today c/o flu like symptoms x 2 days  Is currently undergoing treatment for breast cancer  She takes chemo in pill form every other week  This week is her 'off' week  Did not get a flu shot this year  C/o fever, rhinorrhea and cough  Has not taken anything for the symptoms  History provided by:  Patient and relative  Fever - 9 weeks to 74 years   Max temp prior to arrival:  103  Temp source:  Oral  Severity:  Severe  Onset quality:  Sudden  Duration:  2 days  Timing:  Constant  Progression:  Unchanged  Chronicity:  New  Relieved by:  None tried  Worsened by:  Nothing  Ineffective treatments:  None tried  Associated symptoms: chills, congestion, cough, myalgias and rhinorrhea    Associated symptoms: no chest pain, no confusion, no diarrhea, no dysuria, no ear pain, no headaches, no nausea, no rash, no somnolence, no sore throat and no vomiting    Risk factors: immunosuppression    Risk factors: no contaminated food, no contaminated water, no hx of cancer and no occupational exposure        Prior to Admission Medications   Prescriptions Last Dose Informant Patient Reported? Taking? ALPRAZolam (XANAX) 0 25 mg tablet   No No   Sig: Take 1 tablet by mouth 2 (two) times a day as needed for anxiety for up to 10 days   amLODIPine (NORVASC) 5 mg tablet   Yes No   Sig: Take 5 mg by mouth daily   dexamethasone (DECADRON) 2 mg tablet   No No   Sig: Patient provided with Steroid Titration Schedule     lactulose 20 g/30 mL   No No   Sig: Take 30 mL by mouth 2 (two) times a day   levothyroxine 100 mcg tablet   Yes No   Sig: Take 100 mcg by mouth daily   pantoprazole (PROTONIX) 40 mg tablet   No No   Sig: Take 1 tablet by mouth daily in the early morning   pregabalin (LYRICA) 50 mg capsule   Yes No   Sig: Take 50 mg by mouth 3 (three) times a day   tolterodine (DETROL LA) 4 mg 24 hr capsule   No No   Sig: Take 1 capsule by mouth daily      Facility-Administered Medications: None       Past Medical History:   Diagnosis Date    Arthritis     Asthma     Breast cancer ([address] )     Disease of thyroid gland     Hypertension        Past Surgical History:   Procedure Laterality Date    APPENDECTOMY      BREAST SURGERY      HYSTERECTOMY         Family History   Problem Relation Age of Onset    Hypertension Mother     Diabetes Mother      I have reviewed and agree with the history as documented  Social History   Substance Use Topics    Smoking status: Never Smoker    Smokeless tobacco: Never Used    Alcohol use No        Review of Systems   Constitutional: Positive for chills and fever  HENT: Positive for congestion and rhinorrhea  Negative for ear pain and sore throat  Eyes: Negative for visual disturbance  Respiratory: Positive for cough  Cardiovascular: Negative for chest pain  Gastrointestinal: Negative for diarrhea, nausea and vomiting  Genitourinary: Negative for dysuria  Musculoskeletal: Positive for myalgias  Skin: Negative for rash  Allergic/Immunologic: Positive for immunocompromised state  Neurological: Negative for headaches  Psychiatric/Behavioral: Negative for confusion  Physical Exam  ED Triage Vitals [12/20/17 1757]   Temperature Pulse Respirations Blood Pressure SpO2   (!) 102 4 °F (39 1 °C) (!) 140 16 128/77 95 %      Temp Source Heart Rate Source Patient Position - Orthostatic VS BP Location FiO2 (%)   Temporal Monitor Sitting Right arm --      Pain Score       No Pain           Orthostatic Vital Signs  Vitals:    12/20/17 1757 12/20/17 1900 12/20/17 1956   BP: 128/77  133/85   Pulse: (!) 140 (!) 119 102   Patient Position - Orthostatic VS: Sitting  Sitting       Physical Exam   Constitutional: She is oriented to person, place, and time  She appears well-developed and well-nourished     HENT:   Head: Normocephalic and atraumatic  Eyes: Pupils are equal, round, and reactive to light  Neck: Normal range of motion  Neck supple  Cardiovascular: Regular rhythm  Tachycardia present  Pulmonary/Chest: Effort normal and breath sounds normal    Abdominal: Soft  Bowel sounds are normal    Musculoskeletal: Normal range of motion  Neurological: She is alert and oriented to person, place, and time  Skin: Skin is warm and dry  Psychiatric: She has a normal mood and affect  Nursing note and vitals reviewed  ED Medications  Medications   sodium chloride 0 9 % bolus 1,000 mL (1,000 mL Intravenous New Bag 12/20/17 1955)   cefepime (MAXIPIME) IVPB (premix) 1,000 mg (not administered)   sodium chloride 0 9 % bolus 1,000 mL (0 mL Intravenous Stopped 12/20/17 1934)   acetaminophen (TYLENOL) tablet 975 mg (975 mg Oral Given 12/20/17 1837)       Diagnostic Studies  Results Reviewed     Procedure Component Value Units Date/Time    Influenza A/B and RSV by PCR (indicated for patients >2 mo of age) [[id number]] Collected:  12/20/17 1959    Lab Status: In process Specimen:  Nasopharyngeal from Nasopharyngeal Swab Updated:  12/20/17 2001    Troponin I [[id number]]  (Normal) Collected:  12/20/17 1833    Lab Status:  Final result Specimen:  Blood from Arm, Right Updated:  12/20/17 1908     Troponin I 0 03 ng/mL     Narrative:         [employer] Chemistry analyzer 99% cutoff is > 0 04 ng/mL in network labs    o cTnI 99% cutoff is useful only when applied to patients in the clinical setting of myocardial ischemia  o cTnI 99% cutoff should be interpreted in the context of clinical history, ECG findings and possibly cardiac imaging to establish correct diagnosis  o cTnI 99% cutoff may be suggestive but clearly not indicative of a coronary event without the clinical setting of myocardial ischemia      Lactic acid, plasma [[id number]]  (Normal) Collected:  12/20/17 1833    Lab Status:  Final result Specimen:  Blood from Arm, Right Updated:  12/20/17 1908     LACTIC ACID 1 3 mmol/L     Narrative:         Result may be elevated if tourniquet was used during collection  Comprehensive metabolic panel [[id number]]  (Abnormal) Collected:  12/20/17 1833    Lab Status:  Final result Specimen:  Blood from Arm, Right Updated:  12/20/17 1905     Sodium 136 mmol/L      Potassium 3 6 mmol/L      Chloride 100 mmol/L      CO2 27 mmol/L      Anion Gap 9 mmol/L      BUN 11 mg/dL      Creatinine 0 71 mg/dL      Glucose 98 mg/dL      Calcium 8 4 mg/dL      AST 25 U/L      ALT 27 U/L      Alkaline Phosphatase 80 U/L      Total Protein 7 1 g/dL      Albumin 2 5 (L) g/dL      Total Bilirubin 0 40 mg/dL      eGFR 100 ml/min/1 73sq m     Narrative:         National Kidney Disease Education Program recommendations are as follows:  GFR calculation is accurate only with a steady state creatinine  Chronic Kidney disease less than 60 ml/min/1 73 sq  meters  Kidney failure less than 15 ml/min/1 73 sq  meters  Protime-INR [[id number]]  (Normal) Collected:  12/20/17 1833    Lab Status:  Final result Specimen:  Blood from Arm, Right Updated:  12/20/17 1859     Protime 13 9 seconds      INR 1 05    APTT [[id number]]  (Normal) Collected:  12/20/17 1833    Lab Status:  Final result Specimen:  Blood from Arm, Right Updated:  12/20/17 1859     PTT 30 seconds     Narrative:          Therapeutic Heparin Range = 60-90 seconds    CBC and differential [[id number]]  (Abnormal) Collected:  12/20/17 1833    Lab Status:  Final result Specimen:  Blood from Arm, Right Updated:  12/20/17 1848     WBC 7 42 Thousand/uL      RBC 4 00 Million/uL      Hemoglobin 11 4 (L) g/dL      Hematocrit 36 0 %      MCV 90 fL      MCH 28 5 pg      MCHC 31 7 g/dL      RDW 17 4 (H) %      MPV 8 9 fL      Platelets 582 Thousands/uL      nRBC 0 /100 WBCs      Neutrophils Relative 58 %      Lymphocytes Relative 30 %      Monocytes Relative 11 %      Eosinophils Relative 0 %      Basophils Relative 0 %      Neutrophils Absolute 4 30 Thousands/µL      Lymphocytes Absolute 2 21 Thousands/µL      Monocytes Absolute 0 79 Thousand/µL      Eosinophils Absolute 0 00 Thousand/µL      Basophils Absolute 0 03 Thousands/µL     Blood culture #2 [[id number]] Collected:  12/20/17 1833    Lab Status: In process Specimen:  Blood from Arm, Right Updated:  12/20/17 1842    Blood culture #1 [[id number]] Collected:  12/20/17 1833    Lab Status: In process Specimen:  Blood from Arm, Right Updated:  12/20/17 1842    UA w Reflex to Microscopic w Reflex to Culture [[id number]]     Lab Status:  No result Specimen:  Urine                  XR chest 2 views    (Results Pending)              Procedures  Procedures       Phone Contacts  ED Phone Contact    ED Course  ED Course                                MDM  Number of Diagnoses or Management Options  Fever: new and requires workup  Diagnosis management comments: 7:31 PM  Feeling a little better after tylenol  HR down to 105  BP stable  No obvious source of infection but suspect respiratory, possibly flu  Will recommend admission for monitoring  Will d/w hospitalist whether they want antibiotics or not           Amount and/or Complexity of Data Reviewed  Clinical lab tests: ordered and reviewed  Tests in the radiology section of CPT®: ordered and reviewed  Tests in the medicine section of CPT®: reviewed and ordered  Decide to obtain previous medical records or to obtain history from someone other than the patient: yes  Review and summarize past medical records: yes  Discuss the patient with other providers: yes  Independent visualization of images, tracings, or specimens: yes    Risk of Complications, Morbidity, and/or Mortality  Presenting problems: high  Diagnostic procedures: high  Management options: high    Patient Progress  Patient progress: stable    CritCare Time    Disposition  Final diagnoses:   Fever     Time reflects when diagnosis was documented in both MDM as applicable and the Disposition within this note Time User Action Codes Description Comment    12/20/2017  6:39 PM Everardo Arredondo Add [R50 9] Fever       ED Disposition     ED Disposition Condition Comment    Admit  Case was discussed with JAIRO and the patient's admission status was agreed to be Admission Status: observation status to the service of Dr Hanh Jonas   Follow-up Information    None       Patient's Medications   Discharge Prescriptions    No medications on file     No discharge procedures on file      ED Provider  Electronically Signed by           Olga Doyle,   12/20/17 2011

## 2017-12-21 LAB
ANION GAP SERPL CALCULATED.3IONS-SCNC: 9 MMOL/L (ref 4–13)
APTT PPP: 33 SECONDS (ref 23–35)
ATRIAL RATE: 135 BPM
BUN SERPL-MCNC: 9 MG/DL (ref 5–25)
CALCIUM SERPL-MCNC: 7.8 MG/DL (ref 8.3–10.1)
CHLORIDE SERPL-SCNC: 105 MMOL/L (ref 100–108)
CO2 SERPL-SCNC: 23 MMOL/L (ref 21–32)
CREAT SERPL-MCNC: 0.46 MG/DL (ref 0.6–1.3)
ERYTHROCYTE [DISTWIDTH] IN BLOOD BY AUTOMATED COUNT: 17 % (ref 11.6–15.1)
FLUAV AG SPEC QL: NORMAL
FLUBV AG SPEC QL: NORMAL
GFR SERPL CREATININE-BSD FRML MDRD: 117 ML/MIN/1.73SQ M
GLUCOSE P FAST SERPL-MCNC: 97 MG/DL (ref 65–99)
GLUCOSE SERPL-MCNC: 97 MG/DL (ref 65–140)
HCT VFR BLD AUTO: 31.9 % (ref 34.8–46.1)
HGB BLD-MCNC: 10.3 G/DL (ref 11.5–15.4)
INR PPP: 1.19 (ref 0.86–1.16)
MAGNESIUM SERPL-MCNC: 2.1 MG/DL (ref 1.6–2.6)
MCH RBC QN AUTO: 28.9 PG (ref 26.8–34.3)
MCHC RBC AUTO-ENTMCNC: 32.3 G/DL (ref 31.4–37.4)
MCV RBC AUTO: 89 FL (ref 82–98)
P AXIS: 50 DEGREES
PLATELET # BLD AUTO: 170 THOUSANDS/UL (ref 149–390)
PMV BLD AUTO: 8.7 FL (ref 8.9–12.7)
POTASSIUM SERPL-SCNC: 3.5 MMOL/L (ref 3.5–5.3)
PR INTERVAL: 152 MS
PROTHROMBIN TIME: 15.4 SECONDS (ref 12.1–14.4)
QRS AXIS: 122 DEGREES
QRSD INTERVAL: 74 MS
QT INTERVAL: 274 MS
QTC INTERVAL: 411 MS
RBC # BLD AUTO: 3.57 MILLION/UL (ref 3.81–5.12)
RSV B RNA SPEC QL NAA+PROBE: NORMAL
SODIUM SERPL-SCNC: 137 MMOL/L (ref 136–145)
T WAVE AXIS: 34 DEGREES
VENTRICULAR RATE: 135 BPM
WBC # BLD AUTO: 6.81 THOUSAND/UL (ref 4.31–10.16)

## 2017-12-21 PROCEDURE — 87070 CULTURE OTHR SPECIMN AEROBIC: CPT | Performed by: PHYSICIAN ASSISTANT

## 2017-12-21 PROCEDURE — 87205 SMEAR GRAM STAIN: CPT | Performed by: PHYSICIAN ASSISTANT

## 2017-12-21 PROCEDURE — 36415 COLL VENOUS BLD VENIPUNCTURE: CPT | Performed by: PHYSICIAN ASSISTANT

## 2017-12-21 PROCEDURE — 85610 PROTHROMBIN TIME: CPT | Performed by: PHYSICIAN ASSISTANT

## 2017-12-21 PROCEDURE — 80048 BASIC METABOLIC PNL TOTAL CA: CPT | Performed by: PHYSICIAN ASSISTANT

## 2017-12-21 PROCEDURE — 85730 THROMBOPLASTIN TIME PARTIAL: CPT | Performed by: PHYSICIAN ASSISTANT

## 2017-12-21 PROCEDURE — 83735 ASSAY OF MAGNESIUM: CPT | Performed by: PHYSICIAN ASSISTANT

## 2017-12-21 PROCEDURE — 99285 EMERGENCY DEPT VISIT HI MDM: CPT

## 2017-12-21 PROCEDURE — 85027 COMPLETE CBC AUTOMATED: CPT | Performed by: PHYSICIAN ASSISTANT

## 2017-12-21 RX ORDER — TRAMADOL HYDROCHLORIDE 50 MG/1
50 TABLET ORAL EVERY 6 HOURS PRN
Status: DISCONTINUED | OUTPATIENT
Start: 2017-12-21 | End: 2017-12-22 | Stop reason: HOSPADM

## 2017-12-21 RX ORDER — LORAZEPAM 0.5 MG/1
0.5 TABLET ORAL EVERY 8 HOURS PRN
Status: DISCONTINUED | OUTPATIENT
Start: 2017-12-21 | End: 2017-12-22 | Stop reason: HOSPADM

## 2017-12-21 RX ORDER — OSELTAMIVIR PHOSPHATE 6 MG/ML
FOR SUSPENSION ORAL
Status: COMPLETED
Start: 2017-12-21 | End: 2017-12-21

## 2017-12-21 RX ORDER — PREGABALIN 50 MG/1
CAPSULE ORAL
Status: COMPLETED
Start: 2017-12-21 | End: 2017-12-21

## 2017-12-21 RX ORDER — AZITHROMYCIN 250 MG/1
500 TABLET, FILM COATED ORAL EVERY 24 HOURS
Status: DISCONTINUED | OUTPATIENT
Start: 2017-12-21 | End: 2017-12-22 | Stop reason: HOSPADM

## 2017-12-21 RX ADMIN — LEVOTHYROXINE SODIUM 100 MCG: 100 TABLET ORAL at 09:57

## 2017-12-21 RX ADMIN — PREGABALIN 50 MG: 50 CAPSULE ORAL at 01:45

## 2017-12-21 RX ADMIN — PSEUDOEPHEDRINE HYDROCHLORIDE 75 MG: 60 TABLET, FILM COATED ORAL at 09:57

## 2017-12-21 RX ADMIN — PREGABALIN 50 MG: 50 CAPSULE ORAL at 09:42

## 2017-12-21 RX ADMIN — DOCUSATE SODIUM 100 MG: 100 CAPSULE, LIQUID FILLED ORAL at 09:42

## 2017-12-21 RX ADMIN — TRAMADOL HYDROCHLORIDE 50 MG: 50 TABLET, COATED ORAL at 20:01

## 2017-12-21 RX ADMIN — PSEUDOEPHEDRINE HYDROCHLORIDE 75 MG: 60 TABLET, FILM COATED ORAL at 01:45

## 2017-12-21 RX ADMIN — PSEUDOEPHEDRINE HYDROCHLORIDE 75 MG: 60 TABLET, FILM COATED ORAL at 21:13

## 2017-12-21 RX ADMIN — CEFEPIME HYDROCHLORIDE 1000 MG: 1 INJECTION, SOLUTION INTRAVENOUS at 09:00

## 2017-12-21 RX ADMIN — TOLTERODINE 4 MG: 2 CAPSULE, EXTENDED RELEASE ORAL at 09:57

## 2017-12-21 RX ADMIN — AZITHROMYCIN MONOHYDRATE 500 MG: 250 TABLET ORAL at 13:00

## 2017-12-21 RX ADMIN — ACETAMINOPHEN 650 MG: 325 TABLET ORAL at 06:59

## 2017-12-21 RX ADMIN — SODIUM CHLORIDE 50 ML/HR: 0.9 INJECTION, SOLUTION INTRAVENOUS at 00:00

## 2017-12-21 RX ADMIN — AMLODIPINE BESYLATE 5 MG: 5 TABLET ORAL at 09:57

## 2017-12-21 RX ADMIN — LACTULOSE 20 G: 20 SOLUTION ORAL at 09:40

## 2017-12-21 RX ADMIN — PREGABALIN 50 MG: 50 CAPSULE ORAL at 20:32

## 2017-12-21 RX ADMIN — DOCUSATE SODIUM 100 MG: 100 CAPSULE, LIQUID FILLED ORAL at 17:29

## 2017-12-21 RX ADMIN — CEFEPIME HYDROCHLORIDE 1000 MG: 1 INJECTION, SOLUTION INTRAVENOUS at 20:47

## 2017-12-21 RX ADMIN — PREGABALIN 50 MG: 50 CAPSULE ORAL at 17:13

## 2017-12-21 RX ADMIN — ENOXAPARIN SODIUM 40 MG: 40 INJECTION SUBCUTANEOUS at 09:00

## 2017-12-21 RX ADMIN — POLYETHYLENE GLYCOL 3350 17 G: 17 POWDER, FOR SOLUTION ORAL at 09:43

## 2017-12-21 RX ADMIN — PANTOPRAZOLE SODIUM 40 MG: 40 TABLET, DELAYED RELEASE ORAL at 06:59

## 2017-12-21 NOTE — ED NOTES
Breakfast order placed for patient        [facility] Kitchen  12/21/17 7356

## 2017-12-21 NOTE — OCCUPATIONAL THERAPY NOTE
Occupational Therapy    OT consult received, chart reviewed and attempted to see pt for OT evaluation at this time- however pt in with another service at this time  Family member reports, "they are changing her bed and getting her cleaned up"  Will f/u as schedule permits  Thank you       Helen Turner, OT

## 2017-12-21 NOTE — CASE MANAGEMENT
Initial Clinical Review    Admission: Date/Time/Statement:   Admit OBS on 12/20  @  2011      Orders Placed This Encounter   Procedures    Place in Observation (expected length of stay for this patient is less than two midnights)     Standing Status:   Standing     Number of Occurrences:   1     Order Specific Question:   Admitting Physician     Answer:   Chey Holbrook [[id number]]     Order Specific Question:   Level of Care     Answer:   Med Surg [[id number]]         ED: Date/Time/Mode of Arrival:   ED Arrival Information     Expected Arrival Acuity Means of Arrival Escorted By Service Admission Type    - 12/20/2017 17:51 Emergent Walk-In Family Member General Medicine Emergency    Arrival Complaint    FLU LIKE SYMTOMS          Chief Complaint:   Chief Complaint   Patient presents with    Fever - 9 weeks to 76 years     Pt with flu like symptoms for 2 days        History of Illness: 81 y o  female who presents with PMHx of metastatic BC to bone and lung on chemotherapy home weak on 1 week off p o  her oncologist in [place]  I believe it is the [address]  She presents with her son with flu-like symptoms for 1 week worsening over the past 2 days  Patient has a caregiver who comes to the house as well has her son who takes care  They noticed that she was febrile with myalgias  She also admits to abdominal pain  She has been constipated  She denies any other symptoms at this time  She did not get the flu shot  Patient has a known sacral wound  She is bed-bound  Code status discussed with patient and son extensively  Son is power of   Patient and son both agree that they do not want intubation or CPR  Positive for immunocompromised state    Max temp prior to arrival:  103    ED Vital Signs:      12/20/17 66 7 kg (147 lb)     Date/Time Temp Pulse Resp BP SpO2 Weight Who   12/21/17 0726  100 6 °F (38 1 °C)  118 22 116/72 96 % -- JS   12/21/17 0337  100 8 °F (38 2 °C) 100 20 138/84 98 % -- MA   12/20/17 1956 98 3 °F (36 8 °C) 102 20 133/85 97 % -- JT   12/20/17 1900 --  119 20 -- 96 % -- FH   12/20/17 1757  102 4 °F (39 1 °C)  140 16 128/7        Room air sats  95 - 98%    Abnormal Labs/Diagnostic Test Results:   Wbc  7 42  hgb  11 4  pte  212  CXR -  Mod left pleural effusion - airspace consolidation in LLL; Right-sided chest port noted   Cardiac silhouette is mildly enlarged        ED Treatment:   Medication Administration from 12/20/2017 1751 to 12/21/2017 1108       Date/Time Order Dose Route Action Action by Comments     12/20/2017 1934 sodium chloride 0 9 % bolus 1,000 mL 0 mL Intravenous Stopped Mu Mantilla RN      12/20/2017 1834 sodium chloride 0 9 % bolus 1,000 mL 1,000 mL Intravenous 100 [facility] Brenda Keen RN      12/20/2017 1837 acetaminophen (TYLENOL) tablet 975 mg 975 mg Oral Given Humberto Connell RN      12/20/2017 2055 sodium chloride 0 9 % bolus 1,000 mL 0 mL Intravenous Stopped Mu Mantilla RN      12/20/2017 1955 sodium chloride 0 9 % bolus 1,000 mL 1,000 mL Intravenous New Bag Mu Mantilla RN      12/20/2017 2104 cefepime (MAXIPIME) IVPB (premix) 1,000 mg 0 mg Intravenous Stopped Mu Mantilla RN      12/20/2017 2034 cefepime (MAXIPIME) IVPB (premix) 1,000 mg 1,000 mg Intravenous New Bag Mu Mantilla RN      12/21/2017 0957 amLODIPine (NORVASC) tablet 5 mg 5 mg Oral Given Jared Hernandez RN      12/21/2017 0940 lactulose 20 g/30 mL oral solution 20 g 20 g Oral Given Jared Hernandez RN      12/21/2017 0957 levothyroxine tablet 100 mcg 100 mcg Oral Given Jared Hernandez RN      12/21/2017 0659 pantoprazole (PROTONIX) EC tablet 40 mg 40 mg Oral Given Leatha Velazquez RN      12/21/2017 0942 pregabalin (LYRICA) capsule 50 mg 50 mg Oral Given Jared Hernandez RN      12/21/2017 0145 pregabalin (LYRICA) capsule 50 mg 50 mg Oral Given Leatha Velazquez RN      12/21/2017 0957 tolterodine (DETROL LA) 24 hr capsule 4 mg 4 mg Oral Given Kimberly Concepcion RN      12/21/2017 0000 sodium chloride 0 9 % infusion 50 mL/hr Intravenous New R Jacobo Ruffin [facility]      12/21/2017 2023 docusate sodium (COLACE) capsule 100 mg 100 mg Oral Given Kimberly Concepcion RN      12/20/2017 2332 docusate sodium (COLACE) capsule 100 mg 100 mg Oral Given Cherie Herrera RN      12/21/2017 0943 polyethylene glycol (MIRALAX) packet 17 g 17 g Oral Given Kimberly Concepcion RN      12/21/2017 0900 enoxaparin (LOVENOX) subcutaneous injection 40 mg 40 mg Subcutaneous Given Kimberly Concepcion RN      12/21/2017 9958 acetaminophen (TYLENOL) tablet 650 mg 650 mg Oral Given Chloe Calabrese RN      12/21/2017 0900 cefepime (MAXIPIME) IVPB (premix) 1,000 mg 1,000 mg Intravenous [address] Kimberly Concepcion RN      12/21/2017 0957 oseltamivir (TAMIFLU) oral suspension 75 mg 75 mg Oral Given Kimberly Concepcion RN      12/21/2017 0145 oseltamivir (TAMIFLU) oral suspension 75 mg 75 mg Oral Given Chloe Calabrese RN           Past Medical/Surgical History: Active Ambulatory Problems     Diagnosis Date Noted    Cervical cord compression with myelopathy [facility] ) 10/27/2017    Breast cancer ([facility] ) 10/27/2017    Essential hypertension 10/27/2017    Hypothyroidism 10/28/2017     Resolved Ambulatory Problems     Diagnosis Date Noted    No Resolved Ambulatory Problems     Past Medical History:   Diagnosis Date    Arthritis     Asthma     [facility] cancer [facility] )     Disease of thyroid gland     Hypertension        Admitting Diagnosis: Flu-like symptoms [R68 89]    Age/Sex: 81 y o  female    Assessment/Plan:   · Flu-like symptoms  ? Will place on IV cefepime and Tamiflu until source of infection comes back given her immunocompromised state on chemo  Saenz Spinchante Awaiting blood and flu cultures  No other source of infection noted at this time  ? Will monitor vitals, Tylenol p r n  for fever  , droplet precautions     Plan for Additional Problems:   · Breast cancer  ?  On active chemo p  o   One week on 1 week off  This is her off week  Will follow up with Oncology outpatient  · Hypertension  ? P r n  vasotec, monitor  · Hypothyroidism  ? TSH, home meds  · Normocytic anemia  ? Monitor CBC  · Constipation      ? Colace, Miralax  · Sacral wound  ? Wound consult, wound care, frequent turning, contact precautions-wound culture and gram stain     VTE Prophylaxis: Enoxaparin (Lovenox)  / sequential compression device   Code Status: DNR/DNI discussed with son  POLST: POLST form is not discussed and not completed at this time      Anticipated Length of Stay:  Patient will be admitted on an Observation basis with an anticipated length of stay of  < 2 midnights     Justification for Hospital Stay:  IV antibiotics and monitor vitals      Admission Orders:  Admit telemetry  Continuous pulse ox  Strict bed rest   Consult wound care nurse  Reg diet  Sequential compression device to B/L LE   PT/OT evals       Scheduled Meds:   amLODIPine 5 mg Oral Daily   cefepime 1,000 mg Intravenous Q12H   docusate sodium 100 mg Oral BID   enoxaparin 40 mg Subcutaneous Daily   lactulose 20 g Oral BID   levothyroxine 100 mcg Oral Daily   oseltamivir 75 mg Oral Q12H EDWIN   pantoprazole 40 mg Oral Early Morning   polyethylene glycol 17 g Oral Daily   pregabalin 50 mg Oral TID   tolterodine 4 mg Oral Daily     Continuous Infusions:   sodium chloride 50 mL/hr Last Rate: 50 mL/hr (12/21/17 0000)

## 2017-12-21 NOTE — H&P
[facility] Internal Medicine Progress Note  Patient: Rehana Ahmadi 81 y o  female   MRN: [id number]  PCP: Yamlia Newman MD  Unit/Bed#: ED 32 Encounter: [id number]  Date Of Visit: 12/21/17    Assessment:    Principal Problem:    Flu-like symptoms  Active Problems:    Breast cancer ([address] )    Essential hypertension    Hypothyroidism    Normocytic anemia    Constipation    Sacral wound      Plan:    #1 upper respiratory infection  Influenza A and B ruled out  Bronchitis/early pneumonia is still possibility  Inpatient was history of lung cancer and recent chemotherapy  She is high risk to be discharged at this particular time we'll close monitor for another 24 hours  If remains afebrile HEENT within a week stable we'll consider discharge tomorrow  Patient also reports increased coughing "I get that way when I have bronchitis"  We'll initiate therapy with azithromycin    History of breast good sleepCA  Currently on chemotherapy/establish diagnosis  Continue care on outpatient basis    History of HTN:       We will continue patient home medications  Although initially his blood pressure was higher in emergency department, it later stabilized  Well also initiate IV hydralazine and IV metoprolol for SBP greater than 160 mmHg  We will advise patient to follow-up with next PCP in regards to further better management of ongoing HTN  Latest blood pressure 100/82 heart rate currently 110      VTE Pharmacologic Prophylaxis:   Pharmacologic: Heparin  Mechanical VTE Prophylaxis in Place: Yes    Patient Centered Rounds: I have performed bedside rounds with nursing staff today  Discussions with Specialists or Other Care Team Provider: Yes    Education and Discussions with Family / Patient: Yes    Time Spent for Care: 1 hour  More than 50% of total time spent on counseling and coordination of care as described above  Current Length of Stay: 0 day(s)    Current Patient Status: Inpatient   Certification Statement:  The patient will continue to require additional inpatient hospital stay due to upper respiratory infection    Discharge Plan / Estimated Discharge Date: to be determined    Code Status: Level 3 - DNAR and DNI      Subjective:   "I'm feeling better when can I go home"    Objective:     Vitals:   Temp (24hrs), Av 1 °F (37 8 °C), Min:98 3 °F (36 8 °C), Max:102 4 °F (39 1 °C)    HR:  [100-140] 110  Resp:  [16-22] 17  BP: (108-138)/(72-85) 108/80  SpO2:  [95 %-99 %] 99 %  Body mass index is 26 89 kg/m²  Input and Output Summary (last 24 hours): Intake/Output Summary (Last 24 hours) at 17 1224  Last data filed at 17 5449   Gross per 24 hour   Intake             1650 ml   Output                0 ml   Net             1650 ml       Physical Exam:     Physical Exam    GENERAL APPEARANCE: WD/WN in NAD pleasant  SKIN: no rash  HEENT: NC/AT, PERRLA (B), moist MM, no epistaxis  NECK: Supple, no JVD    LUNGS: CTA (B) mildly prolonged expiratory phase,   no use of accessory muscles    HEART:          S1S 2, RRR  , PMI is not displaced  ABDOMEN: Soft, nontender, nondistended, +BS  Rectal exam:  EXTREMITIES: no edema   PERIPHERAL VASCULAR: palpable pulses   NEURO:  AAO x 3, CN 2-12: non focal  MUSCLE STRENGHT: 5/5 (B), SENSATION: nonfocal  DTR: ++, CEREBELLAR: non focal    Additional Data:     Labs:      Results from last 7 days  Lab Units 17  0633 17  1833   WBC Thousand/uL 6 81 7 42   HEMOGLOBIN g/dL 10 3* 11 4*   HEMATOCRIT % 31 9* 36 0   PLATELETS Thousands/uL 170 212   NEUTROS PCT %  --  58   LYMPHS PCT %  --  30   MONOS PCT %  --  11   EOS PCT %  --  0       Results from last 7 days  Lab Units 17  0633 17  1833   SODIUM mmol/L 137 136   POTASSIUM mmol/L 3 5 3 6   CHLORIDE mmol/L 105 100   CO2 mmol/L 23 27   BUN mg/dL 9 11   CREATININE mg/dL 0 46* 0 71   CALCIUM mg/dL 7 8* 8 4   TOTAL PROTEIN g/dL  --  7 1   BILIRUBIN TOTAL mg/dL  --  0 40   ALK PHOS U/L  --  80   ALT U/L  --  27 AST U/L  --  25   GLUCOSE RANDOM mg/dL 97 98       Results from last 7 days  Lab Units 12/21/17  0633   INR  1 19*       * I Have Reviewed All Lab Data Listed Above  * Additional Pertinent Lab Tests Reviewed: [address] Admission Reviewed    Imaging:    Imaging Reports Reviewed Today Include: Yes  Imaging Personally Reviewed by Myself Includes:  Yes    Recent Cultures (last 7 days):       Results from last 7 days  Lab Units 12/20/17 1959   INFLUENZA A PCR  None Detected   INFLUENZA B PCR  None Detected   RSV PCR  None Detected       Last 24 Hours Medication List:     amLODIPine 5 mg Oral Daily   cefepime 1,000 mg Intravenous Q12H   docusate sodium 100 mg Oral BID   enoxaparin 40 mg Subcutaneous Daily   lactulose 20 g Oral BID   levothyroxine 100 mcg Oral Daily   oseltamivir 75 mg Oral Q12H EDWIN   pantoprazole 40 mg Oral Early Morning   polyethylene glycol 17 g Oral Daily   pregabalin 50 mg Oral TID   tolterodine 4 mg Oral Daily        Today, Patient Was Seen By: Bernardo Daniel MD    ** Please Note: This note has been constructed using a voice recognition system   **

## 2017-12-21 NOTE — H&P
History and Physical - [facility]    Patient Information: Shelton Liz 81 y o  female MRN: [id number]  Unit/Bed#: ED 26 Encounter: [id number]  Admitting Physician: Rosann Schlatter, PA-C  PCP: Shabana Proctor MD  Date of Admission:  12/20/17    Assessment/Plan:    Hospital Problem List:     Principal Problem:    Flu-like symptoms  Active Problems:    Breast cancer ([address] )    Essential hypertension    Hypothyroidism    Normocytic anemia    Constipation    Sacral wound      Plan for the Primary Problem(s):  · Flu-like symptoms  · Will place on IV cefepime and Tamiflu until source of infection comes back given her immunocompromised state on chemo  Saenz Spinner Awaiting blood and flu cultures  No other source of infection noted at this time  · Will monitor vitals, Tylenol p r n  for fever  , droplet precautions    Plan for Additional Problems:   · Breast cancer  · On active chemo p  o   One week on 1 week off  This is her off week  Will follow up with Oncology outpatient  · Hypertension  · P r n  vasotec, monitor  · Hypothyroidism  · TSH, home meds  · Normocytic anemia  · Monitor CBC  · Constipation   · Colace, Miralax  · Sacral wound  · Wound consult, wound care, frequent turning, contact precautions-wound culture and gram stain    VTE Prophylaxis: Enoxaparin (Lovenox)  / sequential compression device   Code Status: DNR/DNI discussed with son  POLST: POLST form is not discussed and not completed at this time  Anticipated Length of Stay:  Patient will be admitted on an Observation basis with an anticipated length of stay of  < 2 midnights  Justification for Hospital Stay:  IV antibiotics and monitor vitals    Total Time for Visit, including Counseling / Coordination of Care: 1 hour  Greater than 50% of this total time spent on direct patient counseling and coordination of care      Chief Complaint:   Flu like sx x two days    History of Present Illness:    Shelton Liz is a 81 y o  female who presents with PMHx of metastatic BC to bone and lung on chemotherapy home weak on 1 week off p o  her oncologist in [place]  I believe it is the [address]  She presents with her son with flu-like symptoms for 1 week worsening over the past 2 days  Patient has a caregiver who comes to the house as well has her son who takes care  They noticed that she was febrile with myalgias  She also admits to abdominal pain  She has been constipated  She denies any other symptoms at this time  She did not get the flu shot  Patient has a known sacral wound  She is bed-bound  Code status discussed with patient and son extensively  Son is power of   Patient and son both agree that they do not want intubation or CPR  Review of Systems:    Review of Systems   Constitutional: Positive for chills and fever  HENT: Negative for congestion  Eyes: Negative for visual disturbance  Respiratory: Negative for cough, shortness of breath and wheezing  Cardiovascular: Negative for chest pain, palpitations and leg swelling  Gastrointestinal: Positive for abdominal pain and constipation  Negative for abdominal distention, diarrhea, nausea and vomiting  Genitourinary: Negative for hematuria  Musculoskeletal: Positive for gait problem  Skin: Positive for wound  Allergic/Immunologic: Positive for immunocompromised state  Neurological: Negative for dizziness, syncope, speech difficulty, weakness, light-headedness, numbness and headaches  Psychiatric/Behavioral: Negative for confusion  Past Medical and Surgical History:     Past Medical History:   Diagnosis Date    Arthritis     Asthma     Breast cancer ([facility] [address] )     Disease of thyroid gland     Hypertension        Past Surgical History:   Procedure Laterality Date    APPENDECTOMY      BREAST SURGERY      HYSTERECTOMY         Meds/Allergies:    Prior to Admission medications    Medication Sig Start Date End Date Taking? Authorizing Provider   ALPRAZolam Stanley Santiago) 0 25 mg tablet Take 1 tablet by mouth 2 (two) times a day as needed for anxiety for up to 10 days 11/16/17 11/26/17  Dallas Arce MD   amLODIPine (NORVASC) 5 mg tablet Take 5 mg by mouth daily    Historical Provider, MD   dexamethasone (DECADRON) 2 mg tablet Patient provided with Steroid Titration Schedule  11/16/17   Celestina Jimenez MD   lactulose 20 g/30 mL Take 30 mL by mouth 2 (two) times a day 11/16/17   Celestina Jimenez MD   levothyroxine 100 mcg tablet Take 100 mcg by mouth daily    Historical Provider, MD   pantoprazole (PROTONIX) 40 mg tablet Take 1 tablet by mouth daily in the early morning 11/17/17   Celestina Jimenez MD   pregabalin (LYRICA) 50 mg capsule Take 50 mg by mouth 3 (three) times a day    Historical Provider, MD   tolterodine (DETROL LA) 4 mg 24 hr capsule Take 1 capsule by mouth daily 11/17/17   Celestina Jimenez MD     Nursing med rec    Allergies: Allergies   Allergen Reactions    Gabapentin GI Intolerance    Hydrocodone GI Intolerance    Ibuprofen Rash       Social History:     Marital Status:     Occupation: retired  Patient Pre-hospital Living Situation: with son  Patient Pre-hospital Level of Mobility: bedbound  Patient Pre-hospital Diet Restrictions: unknown  Substance Use History:   History   Alcohol Use No     History   Smoking Status    Never Smoker   Smokeless Tobacco    Never Used     History   Drug Use No       Family History:    Family History   Problem Relation Age of Onset    Hypertension Mother     Diabetes Mother        Physical Exam:     Vitals:   Blood Pressure: 133/85 (12/20/17 1956)  Pulse: 102 (12/20/17 1956)  Temperature: 98 3 °F (36 8 °C) (12/20/17 1956)  Temp Source: Oral (12/20/17 1956)  Respirations: 20 (12/20/17 1956)  Height: 5' 2" (157 5 cm) (12/20/17 1757)  Weight - Scale: 66 7 kg (147 lb) (12/20/17 1757)  SpO2: 97 % (12/20/17 1956)    Physical Exam   Constitutional: She is oriented to person, place, and time  She appears well-developed and well-nourished  No distress  HENT:   Head: Normocephalic and atraumatic  Mouth/Throat: Oropharynx is clear and moist  No oropharyngeal exudate  Eyes: Conjunctivae are normal  Right eye exhibits no discharge  Left eye exhibits no discharge  No scleral icterus  Neck: Neck supple  Cardiovascular: Regular rhythm, normal heart sounds and intact distal pulses  Exam reveals no gallop and no friction rub  No murmur heard  Tachycardic   Pulmonary/Chest: Effort normal and breath sounds normal  No respiratory distress  She has no wheezes  She has no rales  She exhibits no tenderness  Abdominal: Soft  Bowel sounds are normal  She exhibits no distension and no mass  There is no tenderness  There is no rebound and no guarding  Musculoskeletal: Normal range of motion  She exhibits no edema, tenderness or deformity  Muscle atrophy in the lower extremities bilaterally   Neurological: She is alert and oriented to person, place, and time  No cranial nerve deficit  Coordination normal    Skin: Skin is warm and dry  No rash noted  She is not diaphoretic  No erythema  No pallor  Sacral wound noted without drainage   Psychiatric: She has a normal mood and affect  Her behavior is normal    Nursing note and vitals reviewed  Additional Data:     Lab Results: I have personally reviewed pertinent reports          Results from last 7 days  Lab Units 12/20/17  1833   WBC Thousand/uL 7 42   HEMOGLOBIN g/dL 11 4*   HEMATOCRIT % 36 0   PLATELETS Thousands/uL 212   NEUTROS PCT % 58   LYMPHS PCT % 30   MONOS PCT % 11   EOS PCT % 0       Results from last 7 days  Lab Units 12/20/17  1833   SODIUM mmol/L 136   POTASSIUM mmol/L 3 6   CHLORIDE mmol/L 100   CO2 mmol/L 27   BUN mg/dL 11   CREATININE mg/dL 0 71   CALCIUM mg/dL 8 4   TOTAL PROTEIN g/dL 7 1   BILIRUBIN TOTAL mg/dL 0 40   ALK PHOS U/L 80   ALT U/L 27   AST U/L 25   GLUCOSE RANDOM mg/dL 98       Results from last 7 days  Lab Units 12/20/17  1833   INR  1 05       Imaging: I have personally reviewed pertinent reports  No results found  EKG, Pathology, and Other Studies Reviewed on Admission:   · CMP, trop, lactic, CBC    Allscripts / Epic Records Reviewed: No     ** Please Note: This note has been constructed using a voice recognition system   **

## 2017-12-22 VITALS
HEIGHT: 62 IN | HEART RATE: 98 BPM | BODY MASS INDEX: 27.05 KG/M2 | SYSTOLIC BLOOD PRESSURE: 106 MMHG | RESPIRATION RATE: 18 BRPM | TEMPERATURE: 98.7 F | DIASTOLIC BLOOD PRESSURE: 67 MMHG | OXYGEN SATURATION: 97 % | WEIGHT: 147 LBS

## 2017-12-22 PROCEDURE — G8979 MOBILITY GOAL STATUS: HCPCS

## 2017-12-22 PROCEDURE — G8978 MOBILITY CURRENT STATUS: HCPCS

## 2017-12-22 PROCEDURE — 97163 PT EVAL HIGH COMPLEX 45 MIN: CPT

## 2017-12-22 RX ORDER — AZITHROMYCIN 500 MG/1
250 TABLET, FILM COATED ORAL EVERY 24 HOURS
Qty: 2 TABLET | Refills: 0 | Status: SHIPPED | OUTPATIENT
Start: 2017-12-22 | End: 2017-12-26

## 2017-12-22 RX ADMIN — SODIUM CHLORIDE 50 ML/HR: 0.9 INJECTION, SOLUTION INTRAVENOUS at 09:12

## 2017-12-22 RX ADMIN — PANTOPRAZOLE SODIUM 40 MG: 40 TABLET, DELAYED RELEASE ORAL at 05:59

## 2017-12-22 RX ADMIN — TRAMADOL HYDROCHLORIDE 50 MG: 50 TABLET, COATED ORAL at 04:58

## 2017-12-22 RX ADMIN — AZITHROMYCIN MONOHYDRATE 500 MG: 250 TABLET ORAL at 14:28

## 2017-12-22 RX ADMIN — LEVOTHYROXINE SODIUM 100 MCG: 100 TABLET ORAL at 09:10

## 2017-12-22 RX ADMIN — CEFEPIME HYDROCHLORIDE 1000 MG: 1 INJECTION, SOLUTION INTRAVENOUS at 09:11

## 2017-12-22 RX ADMIN — ENOXAPARIN SODIUM 40 MG: 40 INJECTION SUBCUTANEOUS at 09:08

## 2017-12-22 RX ADMIN — TOLTERODINE 4 MG: 2 CAPSULE, EXTENDED RELEASE ORAL at 09:08

## 2017-12-22 RX ADMIN — PREGABALIN 50 MG: 50 CAPSULE ORAL at 09:08

## 2017-12-22 NOTE — SOCIAL WORK
CM met with patient and Patricia in patient's room  chinedu was sleeping at the time of assessment  Patricia states patient lives with him and his wife in their house  There are 3 steps to enter the house  Patient is wheelchair bound  Patient's daughter in law and home health aid services from [facility] assists with ADLs  Patient is Current with [facility]  Patient fills RX at [employer],  [address]  Patient denies mental health Dx and substance abuse  Patricia is DPOA  [facility] will transport patient home upon discharge

## 2017-12-22 NOTE — DISCHARGE SUMMARY
Discharge- Fernando Talbert 1945, 81 y o  female MRN: [id number]    Unit/Bed#: -[facility] Encounter: [id number]    Primary Care Provider: Megan Singh MD   Date and time admitted to hospital: 12/20/2017  6:09 PM        Sacral wound   Assessment & Plan    · Patient struggles with urinary in, which in turn his created a sacral wound   · Patient recommended that she should get some wound care patient states that this has been an ongoing thing and family is recommending is patient will seek their PCP for further care and have decided that they would rather go home        Constipation   Assessment & Plan    · Patient is a little upset as she feels she was overmedicated this problem as she was started on MiraLax by 1 provider, lactulose by another provider, and she said of 3rd gave her Colace  · RN denies that this has a C diff like appearance in I agree this diarrhea that the patient is currently sparing is likely due to current bowel regimen        Normocytic anemia   Assessment & Plan    · Stable        Hypothyroidism   Assessment & Plan    · Continue Synthroid        Essential hypertension   Assessment & Plan    · Blood pressure acceptable  · Continue home regimen upon discharge        * Flu-like symptoms   Assessment & Plan    · Patient presented with flu-like symptoms however influenza was negative Tamiflu was discontinued  · Patient did meet sepsis requirement upon admission evident by temperature of 102 4°, heart rate of 140, and respiration rate of 22 that initially patient was started on Tamiflu, IV cefepime, azithromycin, blood cultures, flu cultures, Tylenol for fever droplet precautions and supportive measures of IV fluids  · Etiology is more evident of a viral infection as patient had no evidence of a UTI, gastroenteritis, or pneumonia                  Consultations During Hospital Stay:  · Wound care consult initially but not seen as patient has read and family requested discharge given patient's medical stability this was not argue to but however patient was recommended to follow up with her PCP regarding the sacral wound  Culture was done with pending at time of discharge    Procedures Performed:     · Chest x-ray 12/20:  Moderate left pleural effusion airspace consolidation and left lower lobe  · Blood cultures x2 negative for growth 24 hours   · Influenza a/B and RSV negative   · Urinalysis negative nitrates negative leukocyte no clinical signs of UTI  · Wound culture to sacral wound pending    Significant Findings / Test Results:     · Flu-like symptoms likely viral infection improved  · Sepsis likely secondary to viral infection evident by fever, tachycardia, tachypnea  · Breast cancer/lung cancer patient recently treated    Incidental Findings:   · Pleural effusion moderate family states they been following with her cancer doctors and they have been monitoring this and will tap appropriate per their request no inpatient intervention  Test Results Pending at Discharge (will require follow up): · Wound culture  · Blood culture     Outpatient Tests Requested:  · Per PCP and oncology team    Complications:  None    Reason for Admission:  Fever and upper respiratory symptoms    Hospital Course:     Darcie Nguyen is a 81 y o  female patient who originally presented to the hospital on 12/20/2017 due to known history of metastatic breast cancer to bone and lung on chemotherapy has been home for the last week where she became increasingly weak after her on a collagen treatments in [place] at the [address] [place] [address]  Patient's son reported patient was showing flu-like symptoms of worsening myalgias and fevers increased cough from her based known sacral wound in a bed-bound patient  Son is who is the power of  brought her to the ED for further evaluation patient given her symptom presentation was started on Tamiflu, cefepime, and azithromycin    Patient was noted on admission to be febrile, tachycardia, and tachypnea  Chest x-ray showed a moderate pleural effusion which son states they have been monitoring from the [facility] and are discussing whether that needs to after not however do not want to tap this admission if it is not necessary  Patient's influenza came back negative so the Tamiflu was stopped patient's symptoms gradually improved and her fever  After 48 patient noted to be tachycardic however this wax and wane improved overall family and patient requesting discharge and she wanted to go home patient did complain of comes some constipation at admission and was started on MiraLax however further was added lactulose and Colace and patient started with some diarrheal symptoms  Patient states that this has improved and was little frustrated about having been given so much medication  Patient given reassurance that she symptoms should resolve if they would not place reach out to her PCP and patient should reach out to her cancer center and PCP for general follow-up upon discharge  Please see above list of diagnoses and related plan for additional information  Condition at Discharge: stable     Discharge Day Visit / Exam:     Subjective:  Patient states she is diarrhea due to over medication she has no other concerns son is at bedside requesting patient be discharged home given her overall improvement of her symptoms and being that she does not have the full and her upper respiratory symptoms are back to her baseline  Patient denies fevers chills chest pain shortness of breath and her abdominal symptoms have overall improved    Vitals: Blood Pressure: 106/67 (12/22/17 0700)  Pulse: 98 (12/22/17 0700)  Temperature: 98 7 °F (37 1 °C) (12/22/17 0700)  Temp Source: Oral (12/22/17 0700)  Respirations: 18 (12/22/17 0700)  Height: 5' 2" (157 5 cm) (12/20/17 1757)  Weight - Scale: 66 7 kg (147 lb) (12/20/17 1757)  SpO2: 97 % (12/22/17 0700)  Exam: Physical Exam   HENT:   Head: Normocephalic and atraumatic  Eyes: Conjunctivae and EOM are normal    Neck: Normal range of motion  Cardiovascular: Normal rate, regular rhythm and normal heart sounds  No tachycardia at time of this exam   Pulmonary/Chest: Effort normal  No accessory muscle usage  No respiratory distress  She has decreased breath sounds in the left lower field  Abdominal: Soft  Bowel sounds are normal    Genitourinary:   Genitourinary Comments: Incontinent a large amount of urine   Neurological: She is alert  Patient is bed    Skin: Skin is warm and dry  Psychiatric: She has a normal mood and affect  Her behavior is normal        Discussion with Family:  Son at bedside    Discharge instructions/Information to patient and family:   See after visit summary for information provided to patient and family  Provisions for Follow-Up Care:  See after visit summary for information related to follow-up care and any pertinent home health orders  Disposition:     Home with VNA Services (Reminder: Complete face to face encounter)    For Discharges to [facility]:   · Not Applicable to this Patient - Not Applicable to this Patient    Planned Readmission:  None     Discharge Statement:  I spent 40 minutes discharging the patient  This time was spent on the day of discharge  I had direct contact with the patient on the day of discharge  Greater than 50% of the total time was spent examining patient, answering all patient questions, arranging and discussing plan of care with patient as well as directly providing post-discharge instructions  Additional time then spent on discharge activities  Discharge Medications:  See after visit summary for reconciled discharge medications provided to patient and family        ** Please Note: This note has been constructed using a voice recognition system **

## 2017-12-22 NOTE — ASSESSMENT & PLAN NOTE
· Patient is a little upset as she feels she was overmedicated this problem as she was started on MiraLax by 1 provider, lactulose by another provider, and she said of 3rd gave her Colace  · RN denies that this has a C diff like appearance in I agree this diarrhea that the patient is currently sparing is likely due to current bowel regimen

## 2017-12-22 NOTE — PHYSICAL THERAPY NOTE
Physical Therapy Initial Evaluation   Kizzy Lockhart, PT   12/22/17 1007   Note Type   Note type Eval/Treat   Pain Assessment   Pain Assessment 0-10   Pain Score 5   Pain Type Chronic pain   Pain Location Leg;Back   Pain Orientation Right;Left   Pain Descriptors Sharp   Pain Frequency Intermittent   Hospital Pain Intervention(s) Medication (See MAR)   Home Living   Type of Home House   Home Layout Two level;Performs ADLs on one level; Able to live on main level with bedroom/bathroom  (4 steps in with 1 railing )   Bathroom Shower/Tub (sponge bathes)   Bathroom Toilet (uses bedpan)   [address] [facility]   Prior Function   Level of [place] Needs assistance with ADLs and functional mobility   Lives With Son;Family  (son, daughter-in-law, 2 grandkids  )   Receives Help From Family   ADL Assistance Needs assistance  (sponge bath and dressing, transfers needs assist  )   IADLs Needs assistance   Falls in the last 6 months 0   Comments ()   Restrictions/Precautions   Weight Bearing Precautions Per Order No   Braces or Orthoses Other (Comment)  (none)   Other Precautions Chair Alarm; Bed Alarm  (IV)   General   Additional Pertinent History dx: flu like symptoms, constipation  PMH: breast CA with mets to lungs and bone, on chemo, HTN, anemia, thyroid disease, asthma   Cognition   Overall Cognitive Status (seemed a bit impaired  )   Arousal/Participation Alert   Orientation Level Oriented to situation;Oriented to place  (Not oriented to date   Pt thought it was day before Irina)   Memory Within functional limits   Following Commands Follows one step commands without difficulty   RUE Assessment   RUE Assessment WFL  (Able to support body weight in sitting EOB  )   LUE Assessment   LUE Assessment WFL  (Able to support body weight sitting EOB  )   RLE Assessment   RLE Assessment WFL  (3/5)   LLE Assessment   LLE Assessment WFL  (3/5)   Coordination Movements are Fluid and Coordinated 1   Sensation WFL   Bed Mobility   Rolling R 4  Minimal assistance   Additional items Assist x 1; Increased time required  (CG assist, VC's)   Rolling L 4  Minimal assistance   Additional items Assist x 1; Increased time required  (CG and VC's required  )   Supine to Sit 3  Moderate assistance   Additional items Assist x 1; Increased time required;LE management;Verbal cues  (No c/o increased pain  )   Sit to Supine 4  Minimal assistance   Additional items Assist x 1;LE management;Verbal cues; Increased time required  (CG assist  )   Transfers   Sit to Stand (Pt with strict bedrest orders  Discussed with Ramesh Pereira - RN)   Ambulation/Elevation   Gait pattern (Bedrest orders  )   Balance   Static Sitting Fair   Dynamic Sitting Fair -   Static Standing (Bedrest orders  )   Endurance Deficit   Endurance Deficit Yes   Activity Tolerance   Activity Tolerance Patient limited by pain   Medical Staff Made Aware SHARAD- Ramesh Pereira consented to allow pt to participate in PT evaluation  Nurse Made Aware yes   Assessment   Prognosis Fair   Problem List Decreased strength;Decreased endurance; Impaired balance;Decreased mobility; Decreased cognition; Impaired judgement;Decreased safety awareness;Pain   Assessment Pt is 81 y o  female seen for PT evaluation s/p admit to [facility] on 12/20/2017 w/ Flu-like symptoms  PT consulted to assess pt's functional mobility and d/c needs  Order placed for PT eval and tx, w/ strict bedrest order  Comorbidities affecting pt's physical performance at time of assessment include: adm with constipation, hx of breast CA with met's to bone and lungs, anemia, sacral wound, recently on chemotherapy med's at home, hx of asthma, hx of thyroid disease  PTA, pt was requiring A for mobility, has 4 JAN, lives w/ her son, daughter-in-law, and 2 grandchildren in 2  level home and was functioning on the 1st floor for all ADL's and limited mobility activities    Pt stated she transfers OOB via stand/pivot transfers with her son and is w/c dependent  Pt stated she sponge bathes and was using the commode with assist until recently when she became incontinent of urine thus now uses the bedpan at home    Personal factors affecting pt at time of IE include: stairs to enter home, limited insight into impairments, inability to perform IADLs, inability to perform ADLs, inability to live alone and currently on strict bedrest orders    Please find objective findings from PT assessment regarding body systems outlined above with impairments and limitations including weakness, impaired balance, decreased endurance, pain, decreased activity tolerance, decreased functional mobility tolerance, decreased safety awareness, fall risk and decreased skin integrity  The following objective measures performed on IE also reveal limitations: Barthel Index: 15/100  Pt's clinical presentation is currently unstable/unpredictable seen in pt's presentation of functioning currently with a below baseline level of mobility, having breast CA with met's to the bone and lungs with significant c/o pain , having abnormal lab results, undergoing intermittent chemotherapy rx's  Pt to benefit from continued PT tx to address deficits as defined above and maximize level of functional independent mobility and consistency  From PT/mobility standpoint, recommendation for d/c are pending depending on the level of care family members are able to  Continue to provide for pt at home  Pt required a significant amount of assistance PTA and will continue to require 24/7 assist at home after d/c  Due to current strict bedrest orders, mobility beyond the bed level was not able to be assessed  Hopeful for d/c to home with possible home PT rx's  Barriers to Discharge Inaccessible home environment   Goals   Patient Goals Pt stated that she would like to be able to use the commode again in the future      STG Expiration Date 01/01/18   Short Term Goal #1 1  Pt completes bed mobility activities with close supervision with good safety  2  Pt sits EOB for completion of ADL's and LE ex's for 15 minutes intervals with good physical tolerance  ( IE : Pain tolerance/ normal vitals )   (Additional goals for transfers when pt has OOB orders  )   Treatment Day 0   Plan   Treatment/Interventions Functional transfer training;LE strengthening/ROM; Patient/family training;Equipment eval/education; Bed mobility;Spoke to nursing;Spoke to case management   PT Frequency Other (Comment)  (3-5x/wk)   Recommendation   Recommendation Other (Comment)  (hopeful for home with 24/7 family and home PT  )   Equipment Recommended Wheelchair  (Pt has w/c  )   PT - OK to Discharge Yes   Additional Comments (When medically ready for d/c  )   Barthel Index   Feeding 5   Bathing 0   Grooming Score 0   Dressing Score 0   Bladder Score 0   Bowels Score 5   Toilet Use Score 5   Transfers (Bed/Chair) Score 0   Mobility (Level Surface) Score 0   Stairs Score 0   Barthel Index Score 15

## 2017-12-22 NOTE — ED NOTES
Report given to [facility], med surg 3        Erlin Garrison RN  12/21/17 3178

## 2017-12-22 NOTE — ASSESSMENT & PLAN NOTE
· Patient presented with flu-like symptoms however influenza was negative Tamiflu was discontinued  · Patient did meet sepsis requirement upon admission evident by temperature of 102 4°, heart rate of 140, and respiration rate of 22 that initially patient was started on Tamiflu, IV cefepime, azithromycin, blood cultures, flu cultures, Tylenol for fever droplet precautions and supportive measures of IV fluids  · Etiology is more evident of a viral infection as patient had no evidence of a UTI, gastroenteritis, or pneumonia

## 2017-12-22 NOTE — DISCHARGE INSTRUCTIONS
Azithromycin (By mouth)   Azithromycin (ls-fspv-yib-MYE-sin)  Treats infections  This medicine is a macrolide antibiotic  Brand Name(s): Zithromax, Zithromax Tri-Naeem, Zithromax Z-Naeem, Zmax   There may be other brand names for this medicine  When This Medicine Should Not Be Used: This medicine is not right for everyone  Do not use it if you had an allergic reaction to azithromycin, erythromycin, or similar medicines, or you have a history of liver problems caused by azithromycin  How to Use This Medicine:   Capsule, Liquid, Packet, Powder, Tablet  · Your doctor will tell you how much medicine to use  Do not use more than directed  · Take all of the medicine in your prescription to clear up your infection, even if you feel better after the first few doses  · Read and follow the patient instructions that come with this medicine  Talk to your doctor or pharmacist if you have any questions  · Multiple dose (Zithromax® oral liquid or tablets):   ¨ You may take this medicine with or without food  ¨ Shake the bottle well before you measure the medicine  Measure the oral liquid medicine with a marked measuring spoon, oral syringe, or medicine cup  · Single dose (Zmax® extended-release oral liquid or powder):   ¨ Liquid:   § Take this medicine on an empty stomach at least 1 hour before you eat, or 2 hours after you eat  § Call your doctor right away if you vomit within 1 hour after you take the medicine  § You must take the liquid within 12 hours after the pharmacist gives it to you  § Shake the bottle well before you measure the medicine  Measure your dose with a marked measuring spoon, cup, or syringe  ¨ Powder:   § Open 1 packet and pour all of the medicine into a glass with about 2 ounces (¼ cup) of water  Mix well and drink the medicine right away  Pour another 2 ounces of water into the same glass, and drink the remaining medicine  · Missed dose:  If you are taking multiple doses, take the dose as soon as you remember  If it is almost time for your next dose, wait until then to take a regular dose  Do not use extra medicine to make up for a missed dose  · Store the medicine in a closed container at room temperature, away from heat, moisture, and direct light  · Extended-release oral liquid: Do not refrigerate or freeze  · Oral liquid for 1 dose only: Store at room temperature  Do not store in the refrigerator or allow the medicine to freeze  · Oral liquid for multiple doses: Store at room temperature or in the refrigerator  Use it within 10 days of filling the prescription  Drugs and Foods to Avoid:   Ask your doctor or pharmacist before using any other medicine, including over-the-counter medicines, vitamins, and herbal products  · Some medicines can affect how azithromycin works  Tell your doctor if you are also using any of the following:  ¨ Cyclosporine, digoxin, nelfinavir, or phenytoin  ¨ Blood thinner  [address] Medicine for a heart rhythm problem (including amiodarone, dofetilide, procainamide, quinidine, sotalol)  · Zithromax® for multiple doses: Do not take an antacid that contains magnesium or aluminum at the same time you take Zithromax®  An antacid will affect how the medicine works  Antacids will not affect Zmax® for single dose  Warnings While Using This Medicine:   · Tell your doctor if you are pregnant or breastfeeding, or if you have kidney disease, liver disease, heart disease, heart rhythm problems, heart failure, or myasthenia gravis  Tell your doctor if anyone in your family has heart rhythm problems  · This medicine may cause the following problems:   ¨ Serious skin reactions  ¨ Liver problems  ¨ Infantile hypertrophic pyloric stenosis  ¨ Heart rhythm problems  · This medicine can cause diarrhea  Call your doctor if the diarrhea becomes severe, does not stop, or is bloody  Do not take any medicine to stop diarrhea until you have talked to your doctor   Diarrhea can occur 2 months or more after you stop taking this medicine  It may occur 2 months or more after you stop using this medicine  · Call your doctor if your symptoms do not improve or if they get worse  · Keep all medicine out of the reach of children  Never share your medicine with anyone  Possible Side Effects While Using This Medicine:   Call your doctor right away if you notice any of these side effects:  · Allergic reaction: Itching or hives, swelling in your face or hands, swelling or tingling in your mouth or throat, chest tightness, trouble breathing  · Blistering, peeling, red skin rash  · Dark urine, pale stools, nausea, vomiting, loss of appetite, stomach pain, yellow skin or eyes  · Double vision, tiredness or weakness  · Fainting, dizziness, lightheadedness  · Fast, pounding, or uneven heartbeat, chest pain  · Feeling irritable or vomits after feeding (in babies)  · Severe diarrhea that may contain blood, stomach cramps, fever  If you notice these less serious side effects, talk with your doctor:   · Mild diarrhea, nausea, vomiting, stomach pain  If you notice other side effects that you think are caused by this medicine, tell your doctor  Call your doctor for medical advice about side effects  You may report side effects to FDA at 4-554-FDA-8130  © 2017 [address] [employer] Information is for End User's use only and may not be sold, redistributed or otherwise used for commercial purposes  The above information is an  only  It is not intended as medical advice for individual conditions or treatments  Talk to your doctor, nurse or pharmacist before following any medical regimen to see if it is safe and effective for you

## 2017-12-22 NOTE — PLAN OF CARE
Problem: PHYSICAL THERAPY ADULT  Goal: Performs mobility at highest level of function for planned discharge setting  See evaluation for individualized goals  Treatment/Interventions: Functional transfer training, LE strengthening/ROM, Patient/family training, Equipment eval/education, Bed mobility, Spoke to nursing, Spoke to case management  Equipment Recommended: Wheelchair (Pt has w/c  )       See flowsheet documentation for full assessment, interventions and recommendations  Prognosis: Fair  Problem List: Decreased strength, Decreased endurance, Impaired balance, Decreased mobility, Decreased cognition, Impaired judgement, Decreased safety awareness, Pain  Assessment: Pt is 81 y o  female seen for PT evaluation s/p admit to [facility] on 12/20/2017 w/ Flu-like symptoms  PT consulted to assess pt's functional mobility and d/c needs  Order placed for PT eval and tx, w/ strict bedrest order  Comorbidities affecting pt's physical performance at time of assessment include: adm with constipation, hx of breast CA with met's to bone and lungs, anemia, sacral wound, recently on chemotherapy med's at home, hx of asthma, hx of thyroid disease  PTA, pt was requiring A for mobility, has 4 JAN, lives w/ her son, daughter-in-law, and 2 grandchildren in 2  level home and was functioning on the 1st floor for all ADL's and limited mobility activities  Pt stated she transfers OOB via stand/pivot transfers with her son and is w/c dependent  Pt stated she sponge bathes and was using the commode with assist until recently when she became incontinent of urine thus now uses the bedpan at home    Personal factors affecting pt at time of IE include: stairs to enter home, limited insight into impairments, inability to perform IADLs, inability to perform ADLs, inability to live alone and currently on strict bedrest orders     Please find objective findings from PT assessment regarding body systems outlined above with impairments and limitations including weakness, impaired balance, decreased endurance, pain, decreased activity tolerance, decreased functional mobility tolerance, decreased safety awareness, fall risk and decreased skin integrity  The following objective measures performed on IE also reveal limitations: Barthel Index: 15/100  Pt's clinical presentation is currently unstable/unpredictable seen in pt's presentation of functioning currently with a below baseline level of mobility, having breast CA with met's to the bone and lungs with significant c/o pain , having abnormal lab results, undergoing intermittent chemotherapy rx's  Pt to benefit from continued PT tx to address deficits as defined above and maximize level of functional independent mobility and consistency  From PT/mobility standpoint, recommendation for d/c are pending depending on the level of care family members are able to  Continue to provide for pt at home  Pt required a significant amount of assistance PTA and will continue to require 24/7 assist at home after d/c  Due to current strict bedrest orders, mobility beyond the bed level was not able to be assessed  Hopeful for d/c to home with possible home PT rx's  Barriers to Discharge: Inaccessible home environment     Recommendation: Other (Comment) (hopeful for home with 24/7 family and home PT  )     PT - OK to Discharge: Yes    See flowsheet documentation for full assessment

## 2017-12-22 NOTE — ASSESSMENT & PLAN NOTE
· Patient struggles with urinary in, which in turn his created a sacral wound   · Patient recommended that she should get some wound care patient states that this has been an ongoing thing and family is recommending is patient will seek their PCP for further care and have decided that they would rather go home

## 2017-12-22 NOTE — PLAN OF CARE
Problem: DISCHARGE PLANNING - CARE MANAGEMENT  Goal: Discharge to post-acute care or home with appropriate resources  INTERVENTIONS:  - Conduct assessment to determine patient/family and health care team treatment goals, and need for post-acute services based on payer coverage, community resources, and patient preferences, and barriers to discharge  - Address psychosocial, clinical, and financial barriers to discharge as identified in assessment in conjunction with the patient/family and health care team  - Arrange appropriate level of post-acute services according to patient's   needs and preference and payer coverage in collaboration with the physician and health care team  - Communicate with and update the patient/family, physician, and health care team regarding progress on the discharge plan  - Arrange appropriate transportation to post-acute venues  Outcome: Progressing  CM met with patient and Patricia in patient's room  chinedu was sleeping at the time of assessment  Patricia states patient lives with him and his wife in their house  There are 3 steps to enter the house  Patient is wheelchair bound  Patient's daughter in law and home health aid services from [facility] assists with ADLs  Patient is Current with [facility]  [facility]  Patient denies mental health Dx and substance abuse  Patricia is [facility] will transport patient home upon discharge

## 2017-12-23 LAB
BACTERIA WND AEROBE CULT: ABNORMAL
GRAM STN SPEC: ABNORMAL

## 2017-12-25 LAB
BACTERIA BLD CULT: NORMAL
BACTERIA BLD CULT: NORMAL

## 2018-01-09 NOTE — PROGRESS NOTES
Assessment    1  Chronic cough (786 2) (R05)   2  Cough variant asthma (493 82) (J45 991)   3  Obesity (278 00) (E66 9)    Plan  Cough variant asthma    · Ipratropium-Albuterol 0 5-2 5 (3) MG/3ML Inhalation Solution   Rx By: Shanti George; For: Cough variant asthma; Dose of 3 ML; Inhalation; VIANNEY = N; Administered by: Andreina Burgos: 2/2/2016 2:34:00 PM; Last Updated By: Dona Velazco; 2/2/2016 2:34:43 PM   · Follow-up visit in 6 weeks Evaluation and Treatment  Follow-up  Status: Hold For -  Scheduling  Requested for: [id number]   Ordered; For: Cough variant asthma; Ordered By: Shanti George Performed:  Due: 67RJJ0629   · MINI NEBULIZER- POC; Status:Active; Requested for:78Rwm0660;    Perform: In Office; JMD:[id number];NTZQFFS; Today; For:Cough variant asthma; Ordered By:Luann Llamas;    Discussion/Summary  Discussion Summary:   Patient is a very pleasant 71-year-old lady who has never smoked, who has been having chronic cough with extensive coughing spells for about 6 months now which has gradually worsened, had 2 ER visits recently, having nebulizer treatments and prednisone tapering dose, chest x-ray was done August 2015 which was normal   She also complains of shortness of breath and dyspnea on exertion with a cough it's mainly dry nonproductive but occasionally with white mucoid sputum no hemoptysis  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, she says since then that started and has been worse in the past 6 months    She had a respiratory allergy panel done and last visit she was given a very short course of prednisone for about 5 days and a long-acting beta agonist breo to be used daily which she has used for 2 weeks and she is here for followup  She states her cough has significantly decreased since last visit with the use of the long-acting   medication  She was given albuterol via the nebulizer to be used, which she hasn't even taken her nebulizer since her last visit here   Has been just using her BREO on and off  Today he complains again of shortness of breath cough with wheezing  Still with cough with white mucoid productive sputum, no hemoptysis  Discussed with the patient given history of breast seeing given the chronic cough will get CT of the chest without contrast, she states her oncologist has recently ordered a CAT scan of the chest  She states her CT of the chest was normal, need to obtain records [facility]  We'll followup  Also she has not taken the pro air MDI given the cost  Will give albuterol via the nebulizer 4 times daily as needed, also given it last visit she hasn't taken it since then  Reviewed her respiratory allergy panel with increased IgE greater than 300  Discussed with the patient to continue with BREO one puff daily  Rinse mouth after use  I do think she would benefit from an allergy consultation given high IgE levels  Followup in 6 weeks or when necessary earlier as needed  Medication SE Review and Pt Understands Tx: Possible side effects of new medications were reviewed with the patient/guardian today  The treatment plan was reviewed with the patient/guardian  The patient/guardian understands and agrees with the treatment plan      Active Problems    1  Chronic cough (786 2) (R05)   2  Cough variant asthma (493 82) (J45 991)   3  Obesity (278 00) (E66 9)    History of Present Illness  HPI: Patient is a very pleasant 28-year-old lady who has never smoked, who has been having chronic cough with extensive coughing spells for about 6 months now which has gradually worsened, had 2 ER visits recently, having nebulizer treatments and prednisone tapering dose, chest x-ray was done August 2015 which was normal   She also complains of shortness of breath and dyspnea on exertion with a cough it's mainly dry nonproductive but occasionally with white mucoid sputum no hemoptysis    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, she says since then that started and has been worse in the past 6 months  She had a respiratory allergy panel done and last visit she was given a very short course of prednisone for about 5 days and a long-acting beta agonist breo to be used daily which she has used for 2 weeks and she is here for followup  She states her cough has significantly decreased since last visit with the use of the long-acting   medication  She was given albuterol via the nebulizer to be used, which she hasn't even taken her nebulizer since her last visit here  Has been just using her BREO on and off  Today he complains again of shortness of breath cough with wheezing       Review of Systems  Complete-Female - Pulm:   Constitutional: feeling tired, but No fever, no chills, feels well, no tiredness, no recent weight gain or weight loss, no fever, not feeling poorly, no chills and no recent weight loss  Eyes: no complaints of vision problems  ENT: no rhinitis, no PND, no epistaxis  Cardiovascular: no palpitations, no chest pain  Respiratory: shortness of breath, cough, wheezing and shortness of breath during exertion, but as noted in HPI, no orthopnea and no PND  Gastrointestinal: no complaints of esophageal reflux, no abdominal pain  Neurological: no headache, no fainting, no weakness  Psychiatric: no anxiety, no depression  Endocrine: Negative  Hematologic/Lymphatic: - no complaints of swollen glands  Past Medical History    1  History of Benign hypertension (401 1) (I10)   2  History of Cough (786 2) (R05)   3  History of High cholesterol (272 0) (E78 0)   4  History of diabetes mellitus (V12 29) (Z86 39)   5  History of malignant neoplasm of breast (V10 3) (Z85 3)    Surgical History    1  History of Breast Surgery Lumpectomy  Surgical History Reviewed: The surgical history was reviewed and updated today  Family History    1  Family history of hypertension (V17 49) (Z82 49)  Family History Reviewed:    The family history was reviewed and updated today  Social History    · Never a smoker   · No alcohol use   · No drug use  Social History Reviewed: The social history was reviewed and updated today  The social history was reviewed and is unchanged  Current Meds   1  Albuterol Sulfate (2 5 MG/3ML) 0 083% Inhalation Nebulization Solution; USE 1 UNIT   DOSE IN NEBULIZER EVERY 6 HOURS AS NEEDED; Therapy: 50Ojw3660 to (Evaluate:19Jun2016)  Requested for: 37Osx6432; Last   Rx:71Sgy9855 Ordered   2  Breo Ellipta 100-25 MCG/INH Inhalation Aerosol Powder Breath Activated; USE 1   INHALATION ONCE DAILY; Therapy: 86CSX5115 to (Evaluate:21Jan2016); Last Rx:14Vuk4471 Ordered   3  Furosemide 20 MG Oral Tablet; Therapy: (Recorded:21Cxx3698) to Recorded   4  Levothyroxine Sodium 75 MCG Oral Tablet; Therapy: (Recorded:01Zqp4818) to Recorded   5  Lisinopril TABS; Therapy: (Recorded:93Tsz9031) to Recorded   6  Pravastatin Sodium 10 MG Oral Tablet; Therapy: (Recorded:13Vxa0340) to Recorded   7  PredniSONE 10 MG Oral Tablet; take 2 tab for 2 days then one tab for 3 days; Therapy: 63YWQ5781 to Olimpia Durant)  Requested for: 82MVS6676; Last   Rx:30Sep2015 Ordered   8  ProAir  (90 Base) MCG/ACT Inhalation Aerosol Solution; INHALE 2 PUFFS Every   6 hours PRN; Therapy: 44CIU1952 to (Last Rx:15Oct2015)  Requested for: 15Oct2015 Ordered   9  ProAir HFA AERS; Therapy: (Recorded:73Usz3498) to Recorded  Medication List Reviewed: The medication list was reviewed and updated today  Allergies    1  Ibuprofen TABS   2   Vicodin TABS    Vitals  Vital Signs [Data Includes: Current Encounter]    Recorded: 19Ynd0122 01:56PM   Temperature 98 4 F    Heart Rate 114    Blood Pressure 78 mm Hg 110 mm Hg   Height 5 ft 2 in    Weight 189 lb 6 08 oz    BMI Calculated 34 64 kg/m2    BSA Calculated 1 87 m2    O2 Saturation 98      Physical Exam    Constitutional   General appearance: No acute distress, well appearing and well nourished  Ears, Nose, Mouth, and Throat   External inspection of ears and nose: Normal     Nasal mucosa, septum, and turbinates: Normal without edema or erythema  Oropharynx: Normal with no erythema, edema, exudate or lesions  Mallampati Classification: 3  Neck   Neck: Abnormal   Short and wide neck  Jugular veins: Normal     Pulmonary   Chest: Normal     Respiratory effort: No increased work of breathing or signs of respiratory distress  Auscultation of lungs: Abnormal   OCCASIONAL EXPIRATORY RHONCHI  Cardiovascular   Auscultation of heart: Normal rate and rhythm, normal S1 and S2, no murmurs  Examination of extremities for edema and/or varicosities: Normal     Abdomen   Abdomen: Soft, non-tender  Liver and spleen: No hepatomegaly or splenomegaly  Lymphatic   Palpation of lymph nodes in neck: No lymphadenopathy  Musculoskeletal   Gait and station: Normal     Neurologic   Mental Status: Normal  Not confused, no evidence of dementia, good comprehension, good concentration  Skin   Skin and subcutaneous tissue: Limited exam shows no rash      Psychiatric   Orientation to person, place and time: Normal        Signatures   Electronically signed by : ARIELLA Moreno ; Feb 2 2016  2:35PM EST                       (Author)

## 2018-01-11 NOTE — MISCELLANEOUS
Message  Discussed with patient MRI scan results, advised the patient to have an MRI of the C-spine with contrast ASAP and also discussed with the neurosurgery office, they are going to have the neurosurgeon see the MRI of the C-spine which is being reported as cord compression and call the patient ASAP, I also told the patient to follow-up with her oncologist regarding the lung and the neck abnormality and the family physician ASAP  If any worsening symptoms to go to the ER      Plan  Cervical cord compression with myelopathy    · *1 - [facility] Co-Management  *  Status: Active  Requested for: 70XAE6181  Care Summary provided  : Yes   · * MRI CERVICAL SPINE W WO CONTRAST; Status:Need Information - Financial  Authorization;  Requested WYY:[id number];     Signatures   Electronically signed by : Mahsa Franco MD; Oct 26 2017  2:13PM EST                       (Author)

## 2018-01-15 VITALS
RESPIRATION RATE: 16 BRPM | SYSTOLIC BLOOD PRESSURE: 119 MMHG | HEART RATE: 100 BPM | HEIGHT: 62 IN | DIASTOLIC BLOOD PRESSURE: 75 MMHG | TEMPERATURE: 98.2 F

## 2018-01-22 VITALS
BODY MASS INDEX: 27.23 KG/M2 | RESPIRATION RATE: 14 BRPM | DIASTOLIC BLOOD PRESSURE: 86 MMHG | SYSTOLIC BLOOD PRESSURE: 120 MMHG | HEIGHT: 62 IN | WEIGHT: 148 LBS | HEART RATE: 96 BPM

## 2018-01-23 NOTE — MISCELLANEOUS
Provider Comments  Provider Comments:   1st no show to neurology appointment  No call or message received  Letter sent to patient        Signatures   Electronically signed by : Lisa Wheat MD; Dec 13 2017  2:57PM EST                       (Author)

## 2023-05-31 NOTE — OCCUPATIONAL THERAPY NOTE
Occupational Therapy     11/02/17 0985   Restrictions/Precautions   Weight Bearing Precautions Per Order No   Other Precautions Cognitive; Chair Alarm; Bed Alarm; Fall Risk;Pain   Pain Assessment   Pain Assessment 0-10   Pain Score 5   Pain Type Acute pain   Pain Location Neck; Shoulder   Pain Orientation Left   ADL   Where Assessed Edge of bed  (sitting )   UB Dressing Assistance 4  Minimal Assistance   UB Dressing Deficit Pull down in back   UB Dressing Comments able to thread arms through gown and put over head  LB Dressing Assistance 2  Maximal Assistance   LB Dressing Deficit Don/doff R sock  (Min to thread both legs for pants with close supervision)   Toileting Assistance  2  Maximal Assistance   Toileting Deficit Clothing management up;Clothing management down   Toileting Comments required assistance 2* to weak left hand   Bed Mobility   Supine to Sit 5  Supervision   Sit to Supine 4  Minimal assistance   Additional items Assist x 1   Additional Comments helped support right leg  pt able to hold leg and bring back into bed  Transfers   Sit to Stand 3  Moderate assistance   Additional items Assist x 1;Verbal cues   Stand to Sit 3  Moderate assistance   Additional items Assist x 1;Verbal cues  (verbal cues to reach back when sitting)   Functional Mobility   Functional Mobility 2  Maximal assistance   Additional Comments side stepped next to bed to move higher up  knee buckled  Additional items (no ad)   Assessment   Assessment Pt participated in pm ot treatment session focusing on lb and ub dressing, functional transfers, and functional mobility  Pt sat EOB with good balance throughout treatment session  Pt required overall min A  To thread clothes however needed Max A to pull pants up and down  Pt was able to side step with no ad at  bedside with Max A  Pts right knee buckled while sidestepping  Pt reported son had been dressing her the past week   Pt required  Max assistance to shaylee right sock 2* to c/o "drop foot"   Pt was able to bend down without LOB while sitting EOB to reach socks to take    Plan   Treatment Interventions ADL retraining   Goal Expiration Date 11/14/17   OT Frequency 3-5x/wk   Recommendation   OT Discharge Recommendation Short Term Rehab   Milady Nunez [facility]